# Patient Record
Sex: FEMALE | Race: WHITE | Employment: UNEMPLOYED | URBAN - METROPOLITAN AREA
[De-identification: names, ages, dates, MRNs, and addresses within clinical notes are randomized per-mention and may not be internally consistent; named-entity substitution may affect disease eponyms.]

---

## 2017-03-06 PROBLEM — R00.1 BRADYCARDIA: Status: ACTIVE | Noted: 2017-03-06

## 2018-01-24 PROBLEM — Z98.890 S/P BALLOON DILATATION OF ESOPHAGEAL STRICTURE: Status: ACTIVE | Noted: 2018-01-24

## 2018-03-12 ENCOUNTER — HOSPITAL ENCOUNTER (OUTPATIENT)
Age: 73
Discharge: HOME | End: 2018-03-12
Payer: MEDICARE

## 2018-03-12 LAB
ALBUMIN SERPL-MCNC: 4 G/DL (ref 3.5–5.2)
ALP BLD-CCNC: 84 U/L (ref 35–104)
ALT SERPL-CCNC: 10 U/L (ref 0–32)
ANION GAP SERPL CALCULATED.3IONS-SCNC: 18 MMOL/L (ref 7–16)
AST SERPL-CCNC: 21 U/L (ref 0–31)
BASOPHILS ABSOLUTE: 0.03 E9/L (ref 0–0.2)
BASOPHILS RELATIVE PERCENT: 0.6 % (ref 0–2)
BILIRUB SERPL-MCNC: 0.4 MG/DL (ref 0–1.2)
BUN BLDV-MCNC: 12 MG/DL (ref 8–23)
C-REACTIVE PROTEIN: <0.1 MG/DL (ref 0–0.4)
CALCIUM SERPL-MCNC: 9.3 MG/DL (ref 8.6–10.2)
CHLORIDE BLD-SCNC: 104 MMOL/L (ref 98–107)
CO2: 24 MMOL/L (ref 22–29)
CREAT SERPL-MCNC: 0.9 MG/DL (ref 0.5–1)
EOSINOPHILS ABSOLUTE: 0.11 E9/L (ref 0.05–0.5)
EOSINOPHILS RELATIVE PERCENT: 2 % (ref 0–6)
GFR AFRICAN AMERICAN: >60
GFR NON-AFRICAN AMERICAN: >60 ML/MIN/1.73
GLUCOSE BLD-MCNC: 61 MG/DL (ref 74–109)
HCT VFR BLD CALC: 38.2 % (ref 34–48)
HEMOGLOBIN: 11.9 G/DL (ref 11.5–15.5)
IMMATURE GRANULOCYTES #: 0.01 E9/L
IMMATURE GRANULOCYTES %: 0.2 % (ref 0–5)
LYMPHOCYTES ABSOLUTE: 1.02 E9/L (ref 1.5–4)
LYMPHOCYTES RELATIVE PERCENT: 18.9 % (ref 20–42)
MCH RBC QN AUTO: 30.5 PG (ref 26–35)
MCHC RBC AUTO-ENTMCNC: 31.2 % (ref 32–34.5)
MCV RBC AUTO: 97.9 FL (ref 80–99.9)
MONOCYTES ABSOLUTE: 0.73 E9/L (ref 0.1–0.95)
MONOCYTES RELATIVE PERCENT: 13.5 % (ref 2–12)
NEUTROPHILS ABSOLUTE: 3.49 E9/L (ref 1.8–7.3)
NEUTROPHILS RELATIVE PERCENT: 64.8 % (ref 43–80)
PDW BLD-RTO: 12.8 FL (ref 11.5–15)
PLATELET # BLD: 109 E9/L (ref 130–450)
PMV BLD AUTO: 10.9 FL (ref 7–12)
POTASSIUM SERPL-SCNC: 4.2 MMOL/L (ref 3.5–5)
RBC # BLD: 3.9 E12/L (ref 3.5–5.5)
SODIUM BLD-SCNC: 146 MMOL/L (ref 132–146)
TOTAL PROTEIN: 6.6 G/DL (ref 6.4–8.3)
WBC # BLD: 5.4 E9/L (ref 4.5–11.5)

## 2018-03-12 PROCEDURE — 85025 COMPLETE CBC W/AUTO DIFF WBC: CPT

## 2018-03-12 PROCEDURE — 36415 COLL VENOUS BLD VENIPUNCTURE: CPT

## 2018-03-12 PROCEDURE — 80053 COMPREHEN METABOLIC PANEL: CPT

## 2018-03-12 PROCEDURE — 86140 C-REACTIVE PROTEIN: CPT

## 2018-03-28 ENCOUNTER — HOSPITAL ENCOUNTER (OUTPATIENT)
Dept: CT IMAGING | Age: 73
Discharge: HOME | End: 2018-03-30
Payer: MEDICARE

## 2018-03-28 DIAGNOSIS — K57.90 DIVERTICULOSIS OF INTESTINE WITHOUT BLEEDING, UNSPECIFIED INTESTINAL TRACT LOCATION: ICD-10-CM

## 2018-03-28 DIAGNOSIS — R10.30 LOWER ABDOMINAL PAIN: ICD-10-CM

## 2018-03-28 PROCEDURE — 6360000004 HC RX CONTRAST MEDICATION: Performed by: RADIOLOGY

## 2018-03-28 PROCEDURE — 2580000003 HC RX 258: Performed by: RADIOLOGY

## 2018-03-28 PROCEDURE — 74177 CT ABD & PELVIS W/CONTRAST: CPT

## 2018-03-28 RX ORDER — SODIUM CHLORIDE 0.9 % (FLUSH) 0.9 %
10 SYRINGE (ML) INJECTION ONCE
Status: COMPLETED | OUTPATIENT
Start: 2018-03-28 | End: 2018-03-28

## 2018-03-28 RX ADMIN — IOHEXOL 50 ML: 240 INJECTION, SOLUTION INTRATHECAL; INTRAVASCULAR; INTRAVENOUS; ORAL at 09:37

## 2018-03-28 RX ADMIN — IOPAMIDOL 110 ML: 755 INJECTION, SOLUTION INTRAVENOUS at 09:37

## 2018-03-28 RX ADMIN — Medication 10 ML: at 09:37

## 2018-07-25 PROBLEM — E44.0 MODERATE PROTEIN-CALORIE MALNUTRITION (HCC): Status: ACTIVE | Noted: 2018-07-25

## 2018-10-25 ENCOUNTER — HOSPITAL ENCOUNTER (OUTPATIENT)
Age: 73
Discharge: HOME | End: 2018-10-25
Payer: MEDICARE

## 2018-10-25 LAB
ALBUMIN SERPL-MCNC: 4.3 G/DL (ref 3.5–5.2)
ALP BLD-CCNC: 72 U/L (ref 35–104)
ALT SERPL-CCNC: 11 U/L (ref 0–32)
AMYLASE: 135 U/L (ref 20–100)
AST SERPL-CCNC: 23 U/L (ref 0–31)
BILIRUB SERPL-MCNC: 0.5 MG/DL (ref 0–1.2)
BILIRUBIN DIRECT: <0.2 MG/DL (ref 0–0.3)
BILIRUBIN, INDIRECT: NORMAL MG/DL (ref 0–1)
LIPASE: 93 U/L (ref 13–60)
TOTAL PROTEIN: 7 G/DL (ref 6.4–8.3)

## 2018-10-25 PROCEDURE — 36415 COLL VENOUS BLD VENIPUNCTURE: CPT

## 2018-10-25 PROCEDURE — 83690 ASSAY OF LIPASE: CPT

## 2018-10-25 PROCEDURE — 82150 ASSAY OF AMYLASE: CPT

## 2018-10-25 PROCEDURE — 80076 HEPATIC FUNCTION PANEL: CPT

## 2019-05-06 ENCOUNTER — HOSPITAL ENCOUNTER (OUTPATIENT)
Age: 74
Discharge: HOME | End: 2019-05-06
Payer: MEDICARE

## 2019-05-06 LAB
ALBUMIN SERPL-MCNC: 4.2 G/DL (ref 3.5–5.2)
ALP BLD-CCNC: 86 U/L (ref 35–104)
ALT SERPL-CCNC: 11 U/L (ref 0–32)
AMYLASE: 142 U/L (ref 20–100)
ANION GAP SERPL CALCULATED.3IONS-SCNC: 13 MMOL/L (ref 7–16)
AST SERPL-CCNC: 24 U/L (ref 0–31)
BILIRUB SERPL-MCNC: 0.5 MG/DL (ref 0–1.2)
BUN BLDV-MCNC: 9 MG/DL (ref 8–23)
CALCIUM SERPL-MCNC: 9.1 MG/DL (ref 8.6–10.2)
CHLORIDE BLD-SCNC: 104 MMOL/L (ref 98–107)
CO2: 26 MMOL/L (ref 22–29)
CREAT SERPL-MCNC: 1 MG/DL (ref 0.5–1)
GFR AFRICAN AMERICAN: >60
GFR NON-AFRICAN AMERICAN: 54 ML/MIN/1.73
GLUCOSE BLD-MCNC: 106 MG/DL (ref 74–99)
LIPASE: 82 U/L (ref 13–60)
POTASSIUM SERPL-SCNC: 4.3 MMOL/L (ref 3.5–5)
SODIUM BLD-SCNC: 143 MMOL/L (ref 132–146)
TOTAL PROTEIN: 6.8 G/DL (ref 6.4–8.3)

## 2019-05-06 PROCEDURE — 36415 COLL VENOUS BLD VENIPUNCTURE: CPT

## 2019-05-06 PROCEDURE — 83690 ASSAY OF LIPASE: CPT

## 2019-05-06 PROCEDURE — 80053 COMPREHEN METABOLIC PANEL: CPT

## 2019-05-06 PROCEDURE — 82150 ASSAY OF AMYLASE: CPT

## 2019-05-30 ENCOUNTER — OFFICE VISIT (OUTPATIENT)
Dept: CARDIOLOGY CLINIC | Age: 74
End: 2019-05-30
Payer: MEDICARE

## 2019-05-30 VITALS
BODY MASS INDEX: 18.78 KG/M2 | HEART RATE: 58 BPM | SYSTOLIC BLOOD PRESSURE: 138 MMHG | HEIGHT: 64 IN | WEIGHT: 110 LBS | RESPIRATION RATE: 16 BRPM | DIASTOLIC BLOOD PRESSURE: 74 MMHG

## 2019-05-30 DIAGNOSIS — R00.1 BRADYCARDIA: Primary | ICD-10-CM

## 2019-05-30 DIAGNOSIS — I10 ESSENTIAL HYPERTENSION: ICD-10-CM

## 2019-05-30 PROCEDURE — 3017F COLORECTAL CA SCREEN DOC REV: CPT | Performed by: INTERNAL MEDICINE

## 2019-05-30 PROCEDURE — 1036F TOBACCO NON-USER: CPT | Performed by: INTERNAL MEDICINE

## 2019-05-30 PROCEDURE — G8420 CALC BMI NORM PARAMETERS: HCPCS | Performed by: INTERNAL MEDICINE

## 2019-05-30 PROCEDURE — G8400 PT W/DXA NO RESULTS DOC: HCPCS | Performed by: INTERNAL MEDICINE

## 2019-05-30 PROCEDURE — 93000 ELECTROCARDIOGRAM COMPLETE: CPT | Performed by: INTERNAL MEDICINE

## 2019-05-30 PROCEDURE — 1123F ACP DISCUSS/DSCN MKR DOCD: CPT | Performed by: INTERNAL MEDICINE

## 2019-05-30 PROCEDURE — 4040F PNEUMOC VAC/ADMIN/RCVD: CPT | Performed by: INTERNAL MEDICINE

## 2019-05-30 PROCEDURE — G8427 DOCREV CUR MEDS BY ELIG CLIN: HCPCS | Performed by: INTERNAL MEDICINE

## 2019-05-30 PROCEDURE — 1090F PRES/ABSN URINE INCON ASSESS: CPT | Performed by: INTERNAL MEDICINE

## 2019-05-30 PROCEDURE — 99213 OFFICE O/P EST LOW 20 MIN: CPT | Performed by: INTERNAL MEDICINE

## 2019-05-30 NOTE — PATIENT INSTRUCTIONS
Patient Education        A Healthy Heart: Care Instructions  Your Care Instructions    Heart disease occurs when a substance called plaque builds up in the vessels that supply oxygen-rich blood to your heart. This can narrow the blood vessels and reduce blood flow. A heart attack happens when blood flow is completely blocked. A high-fat diet, smoking, and other factors increase the risk of heart disease. Your doctor has found that you have a chance of having heart disease. You can do lots of things to keep your heart healthy. It may not be easy, but you can change your diet, exercise more, and quit smoking. These steps really work to lower your chance of heart disease. Follow-up care is a key part of your treatment and safety. Be sure to make and go to all appointments, and call your doctor if you are having problems. It's also a good idea to know your test results and keep a list of the medicines you take. How can you care for yourself at home? Diet    · Use less salt when you cook and eat. This helps lower your blood pressure. Taste food before salting. Add only a little salt when you think you need it. With time, your taste buds will adjust to less salt.     · Eat fewer snack items, fast foods, canned soups, and other high-salt, high-fat, processed foods.     · Read food labels and try to avoid saturated and trans fats. They increase your risk of heart disease by raising cholesterol levels.     · Limit the amount of solid fat-butter, margarine, and shortening-you eat. Use olive, peanut, or canola oil when you cook. Bake, broil, and steam foods instead of frying them.     · Eating fish can lower your risk for heart disease. Eat at least 2 servings of fish a week. [place], mackerel, herring, sardines, and chunk light tuna are very good choices. These fish contain omega-3 fatty acids.     · Eat a variety of fruit and vegetables every day.  Dark green, deep orange, red, or yellow fruits and vegetables are especially good for you. Examples include spinach, carrots, peaches, and berries.     · Foods high in fiber can reduce your cholesterol and provide important vitamins and minerals. High-fiber foods include whole-grain cereals and breads, oatmeal, beans, brown rice, citrus fruits, and apples.     · Limit drinks and foods with added sugar. These include candy, desserts, and soda pop.    Lifestyle changes    · If your doctor recommends it, get more exercise. Walking is a good choice. Bit by bit, increase the amount you walk every day. Try for at least 30 minutes on most days of the week. You also may want to swim, bike, or do other activities.     · Do not smoke. If you need help quitting, talk to your doctor about stop-smoking programs and medicines. These can increase your chances of quitting for good. Quitting smoking may be the most important step you can take to protect your heart. It is never too late to quit. You will get health benefits right away.     · Limit alcohol to 2 drinks a day for men and 1 drink a day for women. Too much alcohol can cause health problems. Medicines    · Take your medicines exactly as prescribed. Call your doctor if you think you are having a problem with your medicine.     · If your doctor recommends aspirin, take the amount directed each day. Make sure you take aspirin and not another kind of pain reliever, such as acetaminophen (Tylenol). If you take ibuprofen (such as Advil or Motrin) for other problems, take aspirin at least 2 hours before taking ibuprofen. When should you call for help? Call 911 if you have symptoms of a heart attack.  These may include:    · Chest pain or pressure, or a strange feeling in the chest.     · Sweating.     · Shortness of breath.     · Pain, pressure, or a strange feeling in the back, neck, jaw, or upper belly or in one or both shoulders or arms.     · Lightheadedness or sudden weakness.     · A fast or irregular heartbeat.    After you call 911, the  may tell you to chew 1 adult-strength or 2 to 4 low-dose aspirin. Wait for an ambulance. Do not try to drive yourself.   Watch closely for changes in your health, and be sure to contact your doctor if you have any problems. Where can you learn more? Go to https://chpepiceweb.[email or web address]. org and sign in to your [employer] account. Enter C962 in the [employer] box to learn more about \"A Healthy Heart: Care Instructions. \"     If you do not have an account, please click on the \"Sign Up Now\" link. Current as of: July 22, 2018  Content Version: 12.0  © 8557-3956 Healthwise, Incorporated. Care instructions adapted under license by [facility] ([facility]). If you have questions about a medical condition or this instruction, always ask your healthcare professional. [address] any warranty or liability for your use of this information.

## 2019-05-31 NOTE — PROGRESS NOTES
[facility]  MD Ann-Marie Carlton MD Garnett Record, MD Fredderick Eriksson. Avie Hodgkin, MD            Conrad Gomez   [birth year]  Clarisa Duarte DO    Mrs. Conrad Gomez was in the outpatient office on 5/30/2019 for follow up of her bradycardia. This 73-year-old female has had chronic bradycardia. She is asymptomatic from it. Last echo in 2015 revealed a normal EF. She presented to our office today for a follow up visit. She notes no new cardiac complaints. She denies any dizziness. She denies any orthopnea, PND, or lower extremity edema. No episodes of chest pain. Past medical history:   1. Hypertension. 2. GERD and PUD. 3. Allergies to Altace, Covera, and HCTZ. 4. Never a smoker. 5. No family history of premature CAD. 6. [id number] admission, 08/20/2014 with substernal chest pain radiating to the right side. On presentation, CBC and BMP normal. Enzymes x2 negative. EKG: Atrial bigeminy. No ST changes. 7. Lexiscan MPS, 08/21/2014. Normal perfusion. EF 72%. 8. ERCP and stent, 01/06/2015. Stent removal, 02/17/2015. 9. No prior history of DM, CAD, stroke, or TIA. 10. Echo, 03/05/2015. EF 55-60%. Stage I diastolic dysfunction. Mild MR. Mild TR. Normal RVSP.     Review of Systems:  HEENT: negative for acute visual and auditory problems  Constitutional: negative for fever and chills  Respiratory: negative for cough and hemoptysis  Cardiovascular: as per HPI  Gastrointestinal: negative for abdominal pain, diarrhea, nausea and vomiting  Genitourinary: negative for dysuria and hematuria  Derm: negative for rash and skin lesion(s)  Neurological: negative for seizures and tremors  Endocrine: negative for diabetic symptoms including polydipsia and polyuria  Musculoskeletal: negative for CTD  Psychiatric: negative for anxiety and major depression    On exam, she is an elderly female in no particular distress. BP: 138/74. P: 58. R: 16.  Wt. 110 lbs. BMI: 18. HEENT, conjunctiva pink.

## 2020-01-13 ENCOUNTER — HOSPITAL ENCOUNTER (OUTPATIENT)
Age: 75
Discharge: HOME | End: 2020-01-13
Payer: MEDICARE

## 2020-01-13 LAB
ALBUMIN SERPL-MCNC: 4.3 G/DL (ref 3.5–5.2)
ALP BLD-CCNC: 101 U/L (ref 35–104)
ALT SERPL-CCNC: 11 U/L (ref 0–32)
AMYLASE: 160 U/L (ref 20–100)
ANION GAP SERPL CALCULATED.3IONS-SCNC: 18 MMOL/L (ref 7–16)
AST SERPL-CCNC: 25 U/L (ref 0–31)
BASOPHILS ABSOLUTE: 0.03 E9/L (ref 0–0.2)
BASOPHILS RELATIVE PERCENT: 0.6 % (ref 0–2)
BILIRUB SERPL-MCNC: 0.4 MG/DL (ref 0–1.2)
BUN BLDV-MCNC: 9 MG/DL (ref 8–23)
CALCIUM SERPL-MCNC: 9.4 MG/DL (ref 8.6–10.2)
CHLORIDE BLD-SCNC: 105 MMOL/L (ref 98–107)
CO2: 22 MMOL/L (ref 22–29)
CREAT SERPL-MCNC: 0.8 MG/DL (ref 0.5–1)
EOSINOPHILS ABSOLUTE: 0.05 E9/L (ref 0.05–0.5)
EOSINOPHILS RELATIVE PERCENT: 1 % (ref 0–6)
GFR AFRICAN AMERICAN: >60
GFR NON-AFRICAN AMERICAN: >60 ML/MIN/1.73
GLUCOSE BLD-MCNC: 69 MG/DL (ref 74–99)
HCT VFR BLD CALC: 41.2 % (ref 34–48)
HEMOGLOBIN: 12.4 G/DL (ref 11.5–15.5)
IMMATURE GRANULOCYTES #: 0.02 E9/L
IMMATURE GRANULOCYTES %: 0.4 % (ref 0–5)
LIPASE: 91 U/L (ref 13–60)
LYMPHOCYTES ABSOLUTE: 0.89 E9/L (ref 1.5–4)
LYMPHOCYTES RELATIVE PERCENT: 18.2 % (ref 20–42)
MCH RBC QN AUTO: 30.8 PG (ref 26–35)
MCHC RBC AUTO-ENTMCNC: 30.1 % (ref 32–34.5)
MCV RBC AUTO: 102.2 FL (ref 80–99.9)
MONOCYTES ABSOLUTE: 0.58 E9/L (ref 0.1–0.95)
MONOCYTES RELATIVE PERCENT: 11.9 % (ref 2–12)
NEUTROPHILS ABSOLUTE: 3.31 E9/L (ref 1.8–7.3)
NEUTROPHILS RELATIVE PERCENT: 67.9 % (ref 43–80)
PDW BLD-RTO: 12.2 FL (ref 11.5–15)
PLATELET # BLD: 121 E9/L (ref 130–450)
PMV BLD AUTO: 10.9 FL (ref 7–12)
POTASSIUM SERPL-SCNC: 3.9 MMOL/L (ref 3.5–5)
RBC # BLD: 4.03 E12/L (ref 3.5–5.5)
SODIUM BLD-SCNC: 145 MMOL/L (ref 132–146)
TOTAL PROTEIN: 7 G/DL (ref 6.4–8.3)
WBC # BLD: 4.9 E9/L (ref 4.5–11.5)

## 2020-01-13 PROCEDURE — 80053 COMPREHEN METABOLIC PANEL: CPT

## 2020-01-13 PROCEDURE — 36415 COLL VENOUS BLD VENIPUNCTURE: CPT

## 2020-01-13 PROCEDURE — 83690 ASSAY OF LIPASE: CPT

## 2020-01-13 PROCEDURE — 85025 COMPLETE CBC W/AUTO DIFF WBC: CPT

## 2020-01-13 PROCEDURE — 82150 ASSAY OF AMYLASE: CPT

## 2020-01-22 PROBLEM — R00.1 BRADYCARDIA: Status: RESOLVED | Noted: 2017-03-06 | Resolved: 2020-01-22

## 2020-04-17 ENCOUNTER — TELEPHONE (OUTPATIENT)
Dept: CARDIOLOGY CLINIC | Age: 75
End: 2020-04-17

## 2020-07-29 ENCOUNTER — OFFICE VISIT (OUTPATIENT)
Dept: CARDIOLOGY CLINIC | Age: 75
End: 2020-07-29
Payer: MEDICARE

## 2020-07-29 VITALS
HEART RATE: 57 BPM | SYSTOLIC BLOOD PRESSURE: 128 MMHG | HEIGHT: 62 IN | DIASTOLIC BLOOD PRESSURE: 60 MMHG | WEIGHT: 114 LBS | BODY MASS INDEX: 20.98 KG/M2 | RESPIRATION RATE: 16 BRPM

## 2020-07-29 PROCEDURE — 99213 OFFICE O/P EST LOW 20 MIN: CPT | Performed by: INTERNAL MEDICINE

## 2020-07-29 PROCEDURE — G8420 CALC BMI NORM PARAMETERS: HCPCS | Performed by: INTERNAL MEDICINE

## 2020-07-29 PROCEDURE — 4040F PNEUMOC VAC/ADMIN/RCVD: CPT | Performed by: INTERNAL MEDICINE

## 2020-07-29 PROCEDURE — 1036F TOBACCO NON-USER: CPT | Performed by: INTERNAL MEDICINE

## 2020-07-29 PROCEDURE — 1090F PRES/ABSN URINE INCON ASSESS: CPT | Performed by: INTERNAL MEDICINE

## 2020-07-29 PROCEDURE — G8400 PT W/DXA NO RESULTS DOC: HCPCS | Performed by: INTERNAL MEDICINE

## 2020-07-29 PROCEDURE — 93000 ELECTROCARDIOGRAM COMPLETE: CPT | Performed by: INTERNAL MEDICINE

## 2020-07-29 PROCEDURE — 1123F ACP DISCUSS/DSCN MKR DOCD: CPT | Performed by: INTERNAL MEDICINE

## 2020-07-29 PROCEDURE — G8427 DOCREV CUR MEDS BY ELIG CLIN: HCPCS | Performed by: INTERNAL MEDICINE

## 2020-07-29 ASSESSMENT — ENCOUNTER SYMPTOMS
VOMITING: 0
BACK PAIN: 1
ABDOMINAL PAIN: 0
NAUSEA: 0
BLOOD IN STOOL: 0
DIARRHEA: 0
SHORTNESS OF BREATH: 0
WHEEZING: 0
COUGH: 0
CONSTIPATION: 0

## 2020-07-29 NOTE — PROGRESS NOTES
OUTPATIENT CARDIOLOGY FOLLOW-UP    HPI:    Name: Clair Hauser    Age: 66 y.o. Primary Care Physician: Madison Chandler DO    Date of Service: 7/29/2020    Chief Complaint:   Chief Complaint   Patient presents with    Hypertension    Bradycardia        History of present illness : 64-year-old non-smoker female who comes today for one-year follow-up visit. She was seen by Dr. Corina Reed in the past.  She has history of hypertension, esophageal stricture, status post balloon dilatation and ulcer. She is a poor historian. She is fairly active. She denies chest pain but has been complaining of back /neck pain while doing her housework for almost a year. She feels occasional dizziness and does get lower extremity edema. She denies orthopnea, PND or syncope. EKG done today revealed sinus bradycardia at 57 bpm, left atrial enlargement, poor R wave progression, cannot exclude anteroseptal myocardial infarction of unknown age. Review of Systems:   Review of Systems   Constitutional: Negative for chills, fatigue and fever. She has a poor appetite and she has been losing weight. HENT: Negative for nosebleeds. Respiratory: Negative for cough, shortness of breath and wheezing. Gastrointestinal: Negative for abdominal pain, blood in stool, constipation, diarrhea, nausea and vomiting. She has difficulty swallowing liquid and solid. Genitourinary: Negative for dysuria and hematuria. Musculoskeletal: Positive for back pain. Negative for joint swelling and myalgias. Neurological: Negative for syncope and light-headedness. Psychiatric/Behavioral: The patient is not nervous/anxious.            Past Medical History:  Past Medical History:   Diagnosis Date    Bradycardia 3/6/2017    Common bile duct dilation     GERD (gastroesophageal reflux disease)     Hypertension     Weight loss, unintentional        Past Surgical History:  Past Surgical History:   Procedure Laterality Date    CHOLECYSTECTOMY      COLONOSCOPY      ENDOSCOPY, COLON, DIAGNOSTIC      ERCP  01/06/2015    ERCP  2/17/2015    with stent removal    UPPER GASTROINTESTINAL ENDOSCOPY  08/23/2017    Dr Benny Cruz       Family History:  Family History   Problem Relation Age of Onset    High Blood Pressure Mother     Heart Disease Sister     High Blood Pressure Sister     High Blood Pressure Maternal Aunt     Asthma Maternal Grandmother     No Known Problems Brother        Social History:  Social History     Socioeconomic History    Marital status:      Spouse name: Zofia Fleming    Number of children: 2    Years of education: 15    Highest education level: Not on file   Occupational History    Not on file   Social Needs    Financial resource strain: Not on file    Food insecurity     Worry: Not on file     Inability: Not on file   [employer] needs     Medical: Not on file     Non-medical: Not on file   Tobacco Use    Smoking status: Never Smoker    Smokeless tobacco: Never Used   Substance and Sexual Activity    Alcohol use: Not Currently    Drug use: Never    Sexual activity: Not on file   Lifestyle    Physical activity     Days per week: Not on file     Minutes per session: Not on file    Stress: Not on file   Relationships    Social connections     Talks on phone: Not on file     Gets together: Not on file     Attends Zoroastrianism service: Not on file     Active member of club or organization: Not on file     Attends meetings of clubs or organizations: Not on file     Relationship status: Not on file    Intimate partner violence     Fear of current or ex partner: Not on file     Emotionally abused: Not on file     Physically abused: Not on file     Forced sexual activity: Not on file   Other Topics Concern    Not on file   Social History Narrative    Denies caffeine. Allergies:   Allergies   Allergen Reactions    Altace [Ramipril]     Covera-Hs [Verapamil]     Hctz [Hydrochlorothiazide]  Influenza Vaccines      miscarriage       Current Medications:  Current Outpatient Medications   Medication Sig Dispense Refill    amLODIPine (NORVASC) 5 MG tablet Take 1 tablet by mouth daily 90 tablet 0    valsartan (DIOVAN) 160 MG tablet Take 1 tablet by mouth daily 90 tablet 1     No current facility-administered medications for this visit. Physical Exam:  /60   Pulse 57   Resp 16   Ht 5' 2\" (1.575 m)   Wt 114 lb (51.7 kg)   BMI 20.85 kg/m²   Wt Readings from Last 3 Encounters:   07/29/20 114 lb (51.7 kg)   07/22/20 114 lb (51.7 kg)   01/22/20 117 lb (53.1 kg)       Physical Exam  Constitutional:       General: She is not in acute distress. Appearance: She is well-developed. HENT:      Head: Normocephalic and atraumatic. Neck:      Musculoskeletal: Neck supple. Vascular: No carotid bruit or JVD. Cardiovascular:      Rate and Rhythm: Normal rate and regular rhythm. Pulses: Normal pulses. Heart sounds: No murmur. No friction rub. No gallop. Pulmonary:      Breath sounds: Normal breath sounds. No wheezing or rales. Chest:      Chest wall: No tenderness. Abdominal:      General: Bowel sounds are normal. There is no distension. Palpations: Abdomen is soft. There is no mass. Tenderness: There is no abdominal tenderness. Comments: No abdominal bruit. Musculoskeletal:      Right lower leg: No edema. Left lower leg: No edema. Skin:     General: Skin is warm and dry. Neurological:      Mental Status: She is alert and oriented to person, place, and time.            Laboratory Tests:  Lab Results   Component Value Date    CREATININE 0.8 01/13/2020    BUN 9 01/13/2020     01/13/2020    K 3.9 01/13/2020     01/13/2020    CO2 22 01/13/2020     Lab Results   Component Value Date    MG 1.9 12/18/2014     Lab Results   Component Value Date    WBC 4.9 01/13/2020    HGB 12.4 01/13/2020    HCT 41.2 01/13/2020    .2 (H) 01/13/2020  (L) 01/13/2020     Lab Results   Component Value Date    ALT 11 01/13/2020    AST 25 01/13/2020    ALKPHOS 101 01/13/2020    BILITOT 0.4 01/13/2020     Lab Results   Component Value Date    TROPONINI <0.01 09/09/2014    TROPONINI <0.01 09/08/2014    TROPONINI <0.01 09/08/2014     Lab Results   Component Value Date    INR 1.0 02/17/2015    INR 1.0 01/06/2015    INR 1.1 09/08/2014    PROTIME 10.9 02/17/2015    PROTIME 10.7 01/06/2015    PROTIME 11.2 09/08/2014     Lab Results   Component Value Date    TSH 0.870 09/19/2016     Lab Results   Component Value Date    CHOL 150 09/09/2014     Lab Results   Component Value Date    TRIG 104 09/09/2014     Lab Results   Component Value Date    HDL 44 09/09/2014     Lab Results   Component Value Date    LDLCALC 85 09/09/2014     Lab Results   Component Value Date    LABVLDL 21 09/09/2014         ASSESSMENT / PLAN:  -Hypertension: Controlled. -Back pain: Unclear etiology. It could be due to underlying CAD, or musculoskeletal in origin.  -Esophageal stricture, status post balloon dilatation.  -Poor appetite and weight loss. We will continue her current blood pressure medications. Patient did not want to undergo a Lexiscan to rule out significant CAD. We will follow-up at the office in 1 year. The patient's current medication list, allergies, problem list and results of all previously ordered testing were reviewed at today's visit. {  Kellie Harris MD , [facility] [place], [place].   [facility]) Cardiology

## 2021-02-18 ENCOUNTER — HOSPITAL ENCOUNTER (OUTPATIENT)
Age: 76
Discharge: HOME | End: 2021-02-18
Payer: MEDICARE

## 2021-02-18 LAB
ALBUMIN SERPL-MCNC: 4.3 G/DL (ref 3.5–5.2)
ALP BLD-CCNC: 91 U/L (ref 35–104)
ALT SERPL-CCNC: 10 U/L (ref 0–32)
AMYLASE: 169 U/L (ref 20–100)
ANION GAP SERPL CALCULATED.3IONS-SCNC: 13 MMOL/L (ref 7–16)
AST SERPL-CCNC: 24 U/L (ref 0–31)
BASOPHILS ABSOLUTE: 0.03 E9/L (ref 0–0.2)
BASOPHILS RELATIVE PERCENT: 0.9 % (ref 0–2)
BILIRUB SERPL-MCNC: 0.5 MG/DL (ref 0–1.2)
BUN BLDV-MCNC: 8 MG/DL (ref 8–23)
CALCIUM SERPL-MCNC: 9.4 MG/DL (ref 8.6–10.2)
CHLORIDE BLD-SCNC: 103 MMOL/L (ref 98–107)
CO2: 25 MMOL/L (ref 22–29)
CREAT SERPL-MCNC: 1 MG/DL (ref 0.5–1)
EOSINOPHILS ABSOLUTE: 0.05 E9/L (ref 0.05–0.5)
EOSINOPHILS RELATIVE PERCENT: 1.5 % (ref 0–6)
GFR AFRICAN AMERICAN: >60
GFR NON-AFRICAN AMERICAN: 54 ML/MIN/1.73
GLUCOSE BLD-MCNC: 106 MG/DL (ref 74–99)
HCT VFR BLD CALC: 41.8 % (ref 34–48)
HEMOGLOBIN: 12.8 G/DL (ref 11.5–15.5)
IMMATURE GRANULOCYTES #: 0.01 E9/L
IMMATURE GRANULOCYTES %: 0.3 % (ref 0–5)
LIPASE: 76 U/L (ref 13–60)
LYMPHOCYTES ABSOLUTE: 0.67 E9/L (ref 1.5–4)
LYMPHOCYTES RELATIVE PERCENT: 20.2 % (ref 20–42)
MCH RBC QN AUTO: 30.8 PG (ref 26–35)
MCHC RBC AUTO-ENTMCNC: 30.6 % (ref 32–34.5)
MCV RBC AUTO: 100.7 FL (ref 80–99.9)
MONOCYTES ABSOLUTE: 0.34 E9/L (ref 0.1–0.95)
MONOCYTES RELATIVE PERCENT: 10.2 % (ref 2–12)
NEUTROPHILS ABSOLUTE: 2.22 E9/L (ref 1.8–7.3)
NEUTROPHILS RELATIVE PERCENT: 66.9 % (ref 43–80)
PDW BLD-RTO: 12.1 FL (ref 11.5–15)
PLATELET # BLD: 111 E9/L (ref 130–450)
PMV BLD AUTO: 10.8 FL (ref 7–12)
POTASSIUM SERPL-SCNC: 3.9 MMOL/L (ref 3.5–5)
RBC # BLD: 4.15 E12/L (ref 3.5–5.5)
SODIUM BLD-SCNC: 141 MMOL/L (ref 132–146)
TOTAL PROTEIN: 7.1 G/DL (ref 6.4–8.3)
WBC # BLD: 3.3 E9/L (ref 4.5–11.5)

## 2021-02-18 PROCEDURE — 82150 ASSAY OF AMYLASE: CPT

## 2021-02-18 PROCEDURE — 80053 COMPREHEN METABOLIC PANEL: CPT

## 2021-02-18 PROCEDURE — 85025 COMPLETE CBC W/AUTO DIFF WBC: CPT

## 2021-02-18 PROCEDURE — 36415 COLL VENOUS BLD VENIPUNCTURE: CPT

## 2021-02-18 PROCEDURE — 86301 IMMUNOASSAY TUMOR CA 19-9: CPT

## 2021-02-18 PROCEDURE — 83690 ASSAY OF LIPASE: CPT

## 2021-02-20 LAB — CA 19-9: 7 U/ML (ref 0–37)

## 2021-08-18 ENCOUNTER — OFFICE VISIT (OUTPATIENT)
Dept: CARDIOLOGY CLINIC | Age: 76
End: 2021-08-18
Payer: MEDICARE

## 2021-08-18 VITALS
HEIGHT: 60 IN | RESPIRATION RATE: 16 BRPM | HEART RATE: 61 BPM | SYSTOLIC BLOOD PRESSURE: 120 MMHG | OXYGEN SATURATION: 99 % | WEIGHT: 114.6 LBS | DIASTOLIC BLOOD PRESSURE: 76 MMHG | BODY MASS INDEX: 22.5 KG/M2

## 2021-08-18 DIAGNOSIS — R00.1 BRADYCARDIA: Primary | ICD-10-CM

## 2021-08-18 PROCEDURE — G8400 PT W/DXA NO RESULTS DOC: HCPCS | Performed by: INTERNAL MEDICINE

## 2021-08-18 PROCEDURE — 93000 ELECTROCARDIOGRAM COMPLETE: CPT | Performed by: INTERNAL MEDICINE

## 2021-08-18 PROCEDURE — G8427 DOCREV CUR MEDS BY ELIG CLIN: HCPCS | Performed by: INTERNAL MEDICINE

## 2021-08-18 PROCEDURE — 1123F ACP DISCUSS/DSCN MKR DOCD: CPT | Performed by: INTERNAL MEDICINE

## 2021-08-18 PROCEDURE — 1090F PRES/ABSN URINE INCON ASSESS: CPT | Performed by: INTERNAL MEDICINE

## 2021-08-18 PROCEDURE — 4040F PNEUMOC VAC/ADMIN/RCVD: CPT | Performed by: INTERNAL MEDICINE

## 2021-08-18 PROCEDURE — G8420 CALC BMI NORM PARAMETERS: HCPCS | Performed by: INTERNAL MEDICINE

## 2021-08-18 PROCEDURE — 1036F TOBACCO NON-USER: CPT | Performed by: INTERNAL MEDICINE

## 2021-08-18 PROCEDURE — 99213 OFFICE O/P EST LOW 20 MIN: CPT | Performed by: INTERNAL MEDICINE

## 2021-08-18 ASSESSMENT — ENCOUNTER SYMPTOMS
COUGH: 0
VOMITING: 0
SHORTNESS OF BREATH: 0
NAUSEA: 0
WHEEZING: 0
BLOOD IN STOOL: 0
ABDOMINAL PAIN: 0
CONSTIPATION: 0
BACK PAIN: 1
DIARRHEA: 0

## 2021-08-18 NOTE — PROGRESS NOTES
OUTPATIENT CARDIOLOGY FOLLOW-UP    HPI:    Name: Maria T Peterson    Age: 64 y.o. Primary Care Physician: Antonio Swanson DO    Date of Service: 8/18/2021    Chief Complaint:   Chief Complaint   Patient presents with    Bradycardia     annual ov,         History of present illness :   78-year-old non-smoker female who comes today for one-year follow-up visit. She was seen in my office on 7/29/2020. She has history of hypertension, esophageal stricture, status post balloon dilatation and ulcer. Patient feels good. She is fairly active. She can go up 1 flight of stairs with no chest pain or dyspnea on exertion. She has been complaining of on and off upper back pain since prior her last visit to my office. She denies palpitations, dizziness or syncope. She denies orthopnea or PND but admits to get lower extremity edema. EKG done today revealed sinus rhythm at 61 bpm, left atrial enlargement, left left axis deviation and on the septal myocardial infarction of unknown age. Review of Systems:   Review of Systems   Constitutional: Negative for chills, fatigue and fever. HENT: Negative for nosebleeds. Respiratory: Negative for cough, shortness of breath and wheezing. Gastrointestinal: Negative for abdominal pain, blood in stool, constipation, diarrhea, nausea and vomiting. Burping. Genitourinary: Negative for dysuria and hematuria. Musculoskeletal: Positive for back pain. Negative for joint swelling and myalgias. Neurological: Positive for headaches. Negative for syncope and light-headedness. Psychiatric/Behavioral: The patient is not nervous/anxious.            Past Medical History:  Past Medical History:   Diagnosis Date    Bradycardia 3/6/2017    Common bile duct dilation     GERD (gastroesophageal reflux disease)     Hypertension     Weight loss, unintentional        Past Surgical History:  Past Surgical History:   Procedure Laterality Date    CHOLECYSTECTOMY      COLONOSCOPY      ENDOSCOPY, COLON, DIAGNOSTIC      ERCP  01/06/2015    ERCP  2/17/2015    with stent removal    UPPER GASTROINTESTINAL ENDOSCOPY  08/23/2017    Dr Roberta Wright       Family History:  Family History   Problem Relation Age of Onset    High Blood Pressure Mother     Heart Disease Sister     High Blood Pressure Sister     High Blood Pressure Maternal Aunt     Asthma Maternal Grandmother     No Known Problems Brother        Social History:  Social History     Socioeconomic History    Marital status:      Spouse name: Young Thomas    Number of children: 2    Years of education: 15    Highest education level: Not on file   Occupational History    Not on file   Tobacco Use    Smoking status: Never Smoker    Smokeless tobacco: Never Used   Vaping Use    Vaping Use: Never used   Substance and Sexual Activity    Alcohol use: Not Currently    Drug use: Never    Sexual activity: Not on file   Other Topics Concern    Not on file   Social History Narrative    Denies caffeine. Social Determinants of Health     Financial Resource Strain:     Difficulty of Paying Living Expenses:    Food Insecurity: No Food Insecurity    Worried About Running Out of Food in the Last Year: Never true    Parul of Food in the Last Year: Never true   Transportation Needs:     Lack of Transportation (Medical):      Lack of Transportation (Non-Medical):    Physical Activity:     Days of Exercise per Week:     Minutes of Exercise per Session:    Stress:     Feeling of Stress :    Social Connections:     Frequency of Communication with Friends and Family:     Frequency of Social Gatherings with Friends and Family:     Attends Yazdanism Services:     Active Member of Clubs or Organizations:     Attends Club or Organization Meetings:     Marital Status:    Intimate Partner Violence:     Fear of Current or Ex-Partner:     Emotionally Abused:     Physically Abused:     Sexually Abused: Allergies: Allergies   Allergen Reactions    Altace [Ramipril]     Covera-Hs [Verapamil]     Hctz [Hydrochlorothiazide]     Influenza Vaccines      miscarriage       Current Medications:  Current Outpatient Medications   Medication Sig Dispense Refill    valsartan (DIOVAN) 160 MG tablet Take 0.5 tablets by mouth daily 90 tablet 3     No current facility-administered medications for this visit. Physical Exam:  Ht 5' (1.524 m)   BMI 22.75 kg/m²   Wt Readings from Last 3 Encounters:   07/26/21 116 lb 8 oz (52.8 kg)   01/20/21 115 lb (52.2 kg)   07/29/20 114 lb (51.7 kg)       Physical Exam  Constitutional:       General: She is not in acute distress. Appearance: She is well-developed. HENT:      Head: Normocephalic and atraumatic. Neck:      Vascular: No carotid bruit or JVD. Cardiovascular:      Rate and Rhythm: Normal rate and regular rhythm. Heart sounds: No murmur heard. No friction rub. No gallop. Pulmonary:      Breath sounds: Normal breath sounds. No wheezing or rales. Chest:      Chest wall: No tenderness. Abdominal:      General: Bowel sounds are normal. There is no distension. Palpations: Abdomen is soft. There is no mass. Tenderness: There is no abdominal tenderness. Comments: No abdominal bruit. Musculoskeletal:      Cervical back: Neck supple. Right lower leg: Edema present. Left lower leg: Edema present. Comments: 1+ bilateral pitting lower extremity edema. Skin:     General: Skin is warm and dry. Neurological:      Mental Status: She is alert and oriented to person, place, and time.            Laboratory Tests:  Lab Results   Component Value Date    CREATININE 1.0 02/18/2021    BUN 8 02/18/2021     02/18/2021    K 3.9 02/18/2021     02/18/2021    CO2 25 02/18/2021     Lab Results   Component Value Date    MG 1.9 12/18/2014     Lab Results   Component Value Date    WBC 3.3 (L) 02/18/2021    HGB 12.8 02/18/2021    HCT 41.8 02/18/2021    .7 (H) 02/18/2021     (L) 02/18/2021     Lab Results   Component Value Date    ALT 10 02/18/2021    AST 24 02/18/2021    ALKPHOS 91 02/18/2021    BILITOT 0.5 02/18/2021     Lab Results   Component Value Date    TROPONINI <0.01 09/09/2014    TROPONINI <0.01 09/08/2014    TROPONINI <0.01 09/08/2014     Lab Results   Component Value Date    INR 1.0 02/17/2015    INR 1.0 01/06/2015    INR 1.1 09/08/2014    PROTIME 10.9 02/17/2015    PROTIME 10.7 01/06/2015    PROTIME 11.2 09/08/2014     Lab Results   Component Value Date    TSH 0.870 09/19/2016       Lab Results   Component Value Date    CHOL 150 09/09/2014     Lab Results   Component Value Date    TRIG 104 09/09/2014     Lab Results   Component Value Date    HDL 44 09/09/2014     Lab Results   Component Value Date    LDLCALC 85 09/09/2014     Lab Results   Component Value Date    LABVLDL 21 09/09/2014         ASSESSMENT / PLAN:  -Lower extremity edema: Unclear etiology.  -Hypertension: Controlled. -Status post esophageal dilatation.  -Status post post ERCP. -Chronic back pain. We will continue Diovan. Patient was advised to have low-salt diet and leg elevation at home. Patient did not want to undergo an echocardiogram to assess her ejection fraction, rule out wall motion abnormalities, and rule out significant valvular heart disease. She also did not want to have a Lexiscan to rule out significant myocardial ischemia. She states that she is busy taking care of her sick . Consider low-dose Lasix and potassium supplementation if worsening of her lower extremity edema. We will follow-up at the office in 1 year. The patient's current medication list, allergies, problem list and results of all previously ordered testing were reviewed at today's visit. {  Jo Ann Jiménez MD , [facility] - [place].   [facility]) Cardiology

## 2022-04-28 ENCOUNTER — HOSPITAL ENCOUNTER (OUTPATIENT)
Age: 77
Discharge: HOME | End: 2022-04-28
Payer: MEDICARE

## 2022-04-28 DIAGNOSIS — I10 ESSENTIAL HYPERTENSION, BENIGN: Chronic | ICD-10-CM

## 2022-04-28 DIAGNOSIS — Z11.59 ENCOUNTER FOR HEPATITIS C SCREENING TEST FOR LOW RISK PATIENT: ICD-10-CM

## 2022-04-28 DIAGNOSIS — E44.0 MODERATE PROTEIN-CALORIE MALNUTRITION (HCC): ICD-10-CM

## 2022-04-28 LAB
ALBUMIN SERPL-MCNC: 4.8 G/DL (ref 3.5–5.2)
ALP BLD-CCNC: 105 U/L (ref 35–104)
ALT SERPL-CCNC: 12 U/L (ref 0–32)
ANION GAP SERPL CALCULATED.3IONS-SCNC: 13 MMOL/L (ref 7–16)
AST SERPL-CCNC: 28 U/L (ref 0–31)
BILIRUB SERPL-MCNC: 0.7 MG/DL (ref 0–1.2)
BUN BLDV-MCNC: 9 MG/DL (ref 6–23)
CALCIUM SERPL-MCNC: 9.6 MG/DL (ref 8.6–10.2)
CHLORIDE BLD-SCNC: 102 MMOL/L (ref 98–107)
CO2: 27 MMOL/L (ref 22–29)
CREAT SERPL-MCNC: 0.9 MG/DL (ref 0.5–1)
GFR AFRICAN AMERICAN: >60
GFR NON-AFRICAN AMERICAN: >60 ML/MIN/1.73
GLUCOSE BLD-MCNC: 95 MG/DL (ref 74–99)
HCT VFR BLD CALC: 46.1 % (ref 34–48)
HEMOGLOBIN: 14.7 G/DL (ref 11.5–15.5)
MCH RBC QN AUTO: 31.7 PG (ref 26–35)
MCHC RBC AUTO-ENTMCNC: 31.9 % (ref 32–34.5)
MCV RBC AUTO: 99.4 FL (ref 80–99.9)
PDW BLD-RTO: 12.3 FL (ref 11.5–15)
PLATELET # BLD: 108 E9/L (ref 130–450)
PMV BLD AUTO: 10.9 FL (ref 7–12)
POTASSIUM SERPL-SCNC: 4 MMOL/L (ref 3.5–5)
RBC # BLD: 4.64 E12/L (ref 3.5–5.5)
SODIUM BLD-SCNC: 142 MMOL/L (ref 132–146)
TOTAL PROTEIN: 7.7 G/DL (ref 6.4–8.3)
WBC # BLD: 3.3 E9/L (ref 4.5–11.5)

## 2022-04-28 PROCEDURE — 36415 COLL VENOUS BLD VENIPUNCTURE: CPT

## 2022-04-28 PROCEDURE — 85027 COMPLETE CBC AUTOMATED: CPT

## 2022-04-28 PROCEDURE — 80053 COMPREHEN METABOLIC PANEL: CPT

## 2022-04-28 PROCEDURE — 87522 HEPATITIS C REVRS TRNSCRPJ: CPT

## 2022-04-28 PROCEDURE — 86803 HEPATITIS C AB TEST: CPT

## 2022-04-29 LAB — HEPATITIS C ANTIBODY INTERPRETATION: NORMAL

## 2022-05-01 LAB
HCV QNT BY NAAT IU/ML: NOT DETECTED IU/ML
HCV QNT BY NAAT LOG IU/ML: NOT DETECTED LOG IU/ML
INTERPRETATION: NOT DETECTED

## 2022-08-22 ENCOUNTER — OFFICE VISIT (OUTPATIENT)
Dept: CARDIOLOGY CLINIC | Age: 77
End: 2022-08-22
Payer: MEDICARE

## 2022-08-22 VITALS
HEART RATE: 66 BPM | OXYGEN SATURATION: 96 % | SYSTOLIC BLOOD PRESSURE: 120 MMHG | HEIGHT: 62 IN | BODY MASS INDEX: 20.24 KG/M2 | DIASTOLIC BLOOD PRESSURE: 64 MMHG | RESPIRATION RATE: 16 BRPM | WEIGHT: 110 LBS

## 2022-08-22 DIAGNOSIS — I44.1 MOBITZ TYPE 2 SECOND DEGREE AV BLOCK: ICD-10-CM

## 2022-08-22 DIAGNOSIS — I10 ESSENTIAL HYPERTENSION, BENIGN: Primary | ICD-10-CM

## 2022-08-22 PROCEDURE — 1123F ACP DISCUSS/DSCN MKR DOCD: CPT | Performed by: INTERNAL MEDICINE

## 2022-08-22 PROCEDURE — G8420 CALC BMI NORM PARAMETERS: HCPCS | Performed by: INTERNAL MEDICINE

## 2022-08-22 PROCEDURE — G8427 DOCREV CUR MEDS BY ELIG CLIN: HCPCS | Performed by: INTERNAL MEDICINE

## 2022-08-22 PROCEDURE — G8400 PT W/DXA NO RESULTS DOC: HCPCS | Performed by: INTERNAL MEDICINE

## 2022-08-22 PROCEDURE — 93000 ELECTROCARDIOGRAM COMPLETE: CPT | Performed by: INTERNAL MEDICINE

## 2022-08-22 PROCEDURE — 99214 OFFICE O/P EST MOD 30 MIN: CPT | Performed by: INTERNAL MEDICINE

## 2022-08-22 PROCEDURE — 1090F PRES/ABSN URINE INCON ASSESS: CPT | Performed by: INTERNAL MEDICINE

## 2022-08-22 PROCEDURE — 1036F TOBACCO NON-USER: CPT | Performed by: INTERNAL MEDICINE

## 2022-08-22 ASSESSMENT — ENCOUNTER SYMPTOMS
CONSTIPATION: 0
COUGH: 0
BLOOD IN STOOL: 0
SHORTNESS OF BREATH: 0
NAUSEA: 0
WHEEZING: 0
VOMITING: 0
DIARRHEA: 0
BACK PAIN: 0
ABDOMINAL PAIN: 0

## 2022-08-22 NOTE — PROGRESS NOTES
OUTPATIENT CARDIOLOGY FOLLOW-UP    HPI:    Name: Shelby Conteh    Age: 76 y.o. Primary Care Physician: John Hanson DO    Date of Service: 8/22/2022    Chief Complaint:   Chief Complaint   Patient presents with    Hypertension     Annual OV. Denies cardiac complaints        History of present illness :   66-year-old non-smoker female who comes today for one-year follow-up visit. She has history of hypertension, esophageal stricture, status post balloon dilatation and ulcer. Patient feels good. She is fairly active. She can go up 9 steps at home with no chest pain or dyspnea on exertion. She denies palpitations, dizziness, lightheadedness or syncope. She denies orthopnea, PND or lower extremity edema. EKG done today revealed sinus rhythm at 66 bpm with first degree AV block and Mobitz 2 AV block, probable anterior myocardial infarction of unknown age and LVH. Review of Systems:   Review of Systems   Constitutional:  Negative for chills, fatigue and fever. HENT:  Negative for nosebleeds. Respiratory:  Negative for cough, shortness of breath and wheezing. Gastrointestinal:  Negative for abdominal pain, blood in stool, constipation, diarrhea, nausea and vomiting. GERD. Little problem of swallowing of solid food. Genitourinary:  Negative for dysuria and hematuria. Musculoskeletal:  Negative for back pain, joint swelling and myalgias. Aching. Neurological:  Negative for syncope and light-headedness. Psychiatric/Behavioral:  The patient is not nervous/anxious.          Past Medical History:  Past Medical History:   Diagnosis Date    Bradycardia 3/6/2017    Common bile duct dilation     GERD (gastroesophageal reflux disease)     Hypertension     Weight loss, unintentional        Past Surgical History:  Past Surgical History:   Procedure Laterality Date    CHOLECYSTECTOMY      COLONOSCOPY      ENDOSCOPY, COLON, DIAGNOSTIC      ERCP  01/06/2015    ERCP  2/17/2015    with stent removal    UPPER GASTROINTESTINAL ENDOSCOPY  08/23/2017    Dr Marc Law       Family History:  Family History   Problem Relation Age of Onset    High Blood Pressure Mother     Heart Disease Sister     High Blood Pressure Sister     High Blood Pressure Maternal Aunt     Asthma Maternal Grandmother     No Known Problems Brother        Social History:  Social History     Socioeconomic History    Marital status:      Spouse name: Eva Anaya    Number of children: 2    Years of education: 12    Highest education level: Not on file   Occupational History    Not on file   Tobacco Use    Smoking status: Never    Smokeless tobacco: Never   Vaping Use    Vaping Use: Never used   Substance and Sexual Activity    Alcohol use: Not Currently    Drug use: Never    Sexual activity: Not on file   Other Topics Concern    Not on file   Social History Narrative    Denies caffeine. Social Determinants of Health     Financial Resource Strain: Low Risk     Difficulty of Paying Living Expenses: Not hard at all   Food Insecurity: No Food Insecurity    Worried About Running Out of Food in the Last Year: Never true    Ran Out of Food in the Last Year: Never true   Transportation Needs: Not on file   Physical Activity: Not on file   Stress: Not on file   Social Connections: Not on file   Intimate Partner Violence: Not on file   Housing Stability: Not on file       Allergies: Allergies   Allergen Reactions    Altace [Ramipril]     Covera-Hs [Verapamil]     Hctz [Hydrochlorothiazide]     Influenza Vaccines      miscarriage       Current Medications:  Current Outpatient Medications   Medication Sig Dispense Refill    valsartan (DIOVAN) 160 MG tablet TAKE 1 TABLET BY MOUTH EVERY DAY 90 tablet 3     No current facility-administered medications for this visit.        Physical Exam:  /64 (Site: Left Upper Arm, Position: Sitting, Cuff Size: Medium Adult)   Pulse 66   Resp 16   Ht 5' 2\" (1.575 m)   Wt 110 lb (49.9 kg) SpO2 96%   BMI 20.12 kg/m²   Wt Readings from Last 3 Encounters:   08/22/22 110 lb (49.9 kg)   04/25/22 115 lb (52.2 kg)   11/29/21 113 lb (51.3 kg)       Physical Exam  Constitutional:       General: She is not in acute distress. Appearance: She is well-developed. HENT:      Head: Normocephalic and atraumatic. Neck:      Vascular: No carotid bruit or JVD. Cardiovascular:      Rate and Rhythm: Normal rate and regular rhythm. Heart sounds: No murmur heard. No friction rub. No gallop. Pulmonary:      Breath sounds: Normal breath sounds. No wheezing or rales. Chest:      Chest wall: No tenderness. Abdominal:      General: Bowel sounds are normal. There is no distension. Palpations: Abdomen is soft. There is no mass. Tenderness: There is no abdominal tenderness. Comments: No abdominal bruit. Musculoskeletal:      Cervical back: Neck supple. Right lower leg: No edema. Left lower leg: No edema. Skin:     General: Skin is warm and dry. Neurological:      Mental Status: She is alert and oriented to person, place, and time.          Laboratory Tests:  Lab Results   Component Value Date    CREATININE 0.9 04/28/2022    BUN 9 04/28/2022     04/28/2022    K 4.0 04/28/2022     04/28/2022    CO2 27 04/28/2022     Lab Results   Component Value Date/Time    MG 1.9 12/18/2014 05:20 AM     Lab Results   Component Value Date    WBC 3.3 (L) 04/28/2022    HGB 14.7 04/28/2022    HCT 46.1 04/28/2022    MCV 99.4 04/28/2022     (L) 04/28/2022     Lab Results   Component Value Date    ALT 12 04/28/2022    AST 28 04/28/2022    ALKPHOS 105 (H) 04/28/2022    BILITOT 0.7 04/28/2022     Lab Results   Component Value Date    TROPONINI <0.01 09/09/2014    TROPONINI <0.01 09/08/2014    TROPONINI <0.01 09/08/2014     Lab Results   Component Value Date    INR 1.0 02/17/2015    INR 1.0 01/06/2015    INR 1.1 09/08/2014    PROTIME 10.9 02/17/2015    PROTIME 10.7 01/06/2015 PROTIME 11.2 09/08/2014     Lab Results   Component Value Date    TSH 0.870 09/19/2016     No results found for: LABA1C  No results found for: EAG  Lab Results   Component Value Date    CHOL 150 09/09/2014     Lab Results   Component Value Date    TRIG 104 09/09/2014     Lab Results   Component Value Date    HDL 44 09/09/2014     Lab Results   Component Value Date    LDLCALC 85 09/09/2014     Lab Results   Component Value Date    LABVLDL 21 09/09/2014         ASSESSMENT / PLAN:  -First-degree AV block with Mobitz 2 AV block: Asymptomatic and on no AV elizabeth blocking agent.  -Hypertension: Controlled. -Status post esophageal dilatation.  -Status post post ERCP. -Chronic back pain. Will arrange for the patient to have a 14-day ZIO XT monitor to rule out significant pauses. Will arrange for the patient to have an echocardiogram to assess for wall motion abnormalities and assess ejection fraction. Will continue Diovan. Patient was asked to seek medical attention if frequent dizziness, lightheadedness or syncope. Patient and patient's son were told that she might need a pacemaker implantation if she becomes symptomatic. Will refer for EP consultation. Follow-up at the office in 6 months. The patient's current medication list, allergies, problem list and results of all previously ordered testing were reviewed at today's visit. Aimee Valenzuela MD , [facility] [place].   [facility]) Cardiology

## 2022-08-22 NOTE — PROGRESS NOTES
14 day Zio xt Monitor was placed per Dr Easton Stacy. Serial number [id number]. Instructions were given & patient verbalized understanding.

## 2022-09-20 ENCOUNTER — TELEPHONE (OUTPATIENT)
Dept: CARDIOLOGY CLINIC | Age: 77
End: 2022-09-20

## 2022-09-20 DIAGNOSIS — I44.1 MOBITZ TYPE 2 SECOND DEGREE AV BLOCK: ICD-10-CM

## 2022-10-25 PROBLEM — I44.1 MOBITZ II: Status: ACTIVE | Noted: 2022-10-25

## 2023-09-21 ENCOUNTER — HOSPITAL ENCOUNTER (OUTPATIENT)
Age: 78
Discharge: HOME | End: 2023-09-21
Payer: MEDICARE

## 2023-09-21 LAB
ALBUMIN SERPL-MCNC: 4.2 G/DL (ref 3.5–5.2)
ALP SERPL-CCNC: 100 U/L (ref 35–104)
ALT SERPL-CCNC: 9 U/L (ref 0–32)
ANION GAP SERPL CALCULATED.3IONS-SCNC: 9 MMOL/L (ref 7–16)
AST SERPL-CCNC: 24 U/L (ref 0–31)
BASOPHILS # BLD: 0.03 K/UL (ref 0–0.2)
BASOPHILS NFR BLD: 1 % (ref 0–2)
BILIRUB SERPL-MCNC: 0.6 MG/DL (ref 0–1.2)
BUN SERPL-MCNC: 15 MG/DL (ref 6–23)
CALCIUM SERPL-MCNC: 9.1 MG/DL (ref 8.6–10.2)
CHLORIDE SERPL-SCNC: 99 MMOL/L (ref 98–107)
CO2 SERPL-SCNC: 27 MMOL/L (ref 22–29)
CREAT SERPL-MCNC: 1 MG/DL (ref 0.5–1)
EOSINOPHIL # BLD: 0.04 K/UL (ref 0.05–0.5)
EOSINOPHILS RELATIVE PERCENT: 1 % (ref 0–6)
ERYTHROCYTE [DISTWIDTH] IN BLOOD BY AUTOMATED COUNT: 12.2 % (ref 11.5–15)
GFR SERPL CREATININE-BSD FRML MDRD: 56 ML/MIN/1.73M2
GLUCOSE SERPL-MCNC: 74 MG/DL (ref 74–99)
HCT VFR BLD AUTO: 39.1 % (ref 34–48)
HGB BLD-MCNC: 12.3 G/DL (ref 11.5–15.5)
IMM GRANULOCYTES # BLD AUTO: <0.03 K/UL (ref 0–0.58)
IMM GRANULOCYTES NFR BLD: 0 % (ref 0–5)
LYMPHOCYTES NFR BLD: 1 K/UL (ref 1.5–4)
LYMPHOCYTES RELATIVE PERCENT: 26 % (ref 20–42)
MCH RBC QN AUTO: 31.5 PG (ref 26–35)
MCHC RBC AUTO-ENTMCNC: 31.5 G/DL (ref 32–34.5)
MCV RBC AUTO: 100.3 FL (ref 80–99.9)
MONOCYTES NFR BLD: 0.42 K/UL (ref 0.1–0.95)
MONOCYTES NFR BLD: 11 % (ref 2–12)
NEUTROPHILS NFR BLD: 61 % (ref 43–80)
NEUTS SEG NFR BLD: 2.35 K/UL (ref 1.8–7.3)
PLATELET, FLUORESCENCE: 114 K/UL (ref 130–450)
PMV BLD AUTO: 10.9 FL (ref 7–12)
POTASSIUM SERPL-SCNC: 4.3 MMOL/L (ref 3.5–5)
PROT SERPL-MCNC: 6.9 G/DL (ref 6.4–8.3)
RBC # BLD AUTO: 3.9 M/UL (ref 3.5–5.5)
SODIUM SERPL-SCNC: 135 MMOL/L (ref 132–146)
WBC OTHER # BLD: 3.9 K/UL (ref 4.5–11.5)

## 2023-09-21 PROCEDURE — 80053 COMPREHEN METABOLIC PANEL: CPT

## 2023-09-21 PROCEDURE — 85025 COMPLETE CBC W/AUTO DIFF WBC: CPT

## 2023-09-21 PROCEDURE — 36415 COLL VENOUS BLD VENIPUNCTURE: CPT

## 2023-11-07 PROBLEM — I44.1 MOBITZ II: Status: RESOLVED | Noted: 2022-10-25 | Resolved: 2023-11-07

## 2023-12-13 ENCOUNTER — TELEPHONE (OUTPATIENT)
Dept: ADMINISTRATIVE | Age: 78
End: 2023-12-13

## 2023-12-13 NOTE — TELEPHONE ENCOUNTER
Current patient Dr. Cardenas has new referral dx Acute diastolic heart failure . Please advise scheduling

## 2024-01-10 ENCOUNTER — HOSPITAL ENCOUNTER (OUTPATIENT)
Age: 79
Discharge: HOME | End: 2024-01-10
Payer: MEDICARE

## 2024-01-10 ENCOUNTER — OFFICE VISIT (OUTPATIENT)
Dept: CARDIOLOGY CLINIC | Age: 79
End: 2024-01-10

## 2024-01-10 VITALS
SYSTOLIC BLOOD PRESSURE: 118 MMHG | BODY MASS INDEX: 24.11 KG/M2 | RESPIRATION RATE: 18 BRPM | HEIGHT: 59 IN | WEIGHT: 119.6 LBS | DIASTOLIC BLOOD PRESSURE: 68 MMHG | OXYGEN SATURATION: 99 % | HEART RATE: 45 BPM

## 2024-01-10 DIAGNOSIS — I50.31 ACUTE DIASTOLIC HEART FAILURE (HCC): ICD-10-CM

## 2024-01-10 DIAGNOSIS — I44.1 SECOND DEGREE AV BLOCK: ICD-10-CM

## 2024-01-10 DIAGNOSIS — I10 ESSENTIAL HYPERTENSION, BENIGN: Primary | ICD-10-CM

## 2024-01-10 LAB
ANION GAP SERPL CALCULATED.3IONS-SCNC: 12 MMOL/L (ref 7–16)
BNP SERPL-MCNC: 3379 PG/ML (ref 0–450)
BUN SERPL-MCNC: 13 MG/DL (ref 6–23)
CALCIUM SERPL-MCNC: 9.3 MG/DL (ref 8.6–10.2)
CHLORIDE SERPL-SCNC: 103 MMOL/L (ref 98–107)
CO2 SERPL-SCNC: 27 MMOL/L (ref 22–29)
CREAT SERPL-MCNC: 1 MG/DL (ref 0.5–1)
GFR SERPL CREATININE-BSD FRML MDRD: 59 ML/MIN/1.73M2
GLUCOSE SERPL-MCNC: 73 MG/DL (ref 74–99)
POTASSIUM SERPL-SCNC: 4.1 MMOL/L (ref 3.5–5)
SODIUM SERPL-SCNC: 142 MMOL/L (ref 132–146)

## 2024-01-10 PROCEDURE — 80048 BASIC METABOLIC PNL TOTAL CA: CPT

## 2024-01-10 PROCEDURE — 83880 ASSAY OF NATRIURETIC PEPTIDE: CPT

## 2024-01-10 PROCEDURE — 36415 COLL VENOUS BLD VENIPUNCTURE: CPT

## 2024-01-10 ASSESSMENT — ENCOUNTER SYMPTOMS
WHEEZING: 0
BACK PAIN: 0
SHORTNESS OF BREATH: 0
ABDOMINAL PAIN: 0
DIARRHEA: 0
BLOOD IN STOOL: 0
VOMITING: 0
COUGH: 0
CONSTIPATION: 0
NAUSEA: 0

## 2024-01-10 NOTE — PROGRESS NOTES
14 day Zio XT  Monitor was placed per Dr Cardenas.  Serial number [id number].  Instructions were given & patient verbalized understanding.

## 2024-01-10 NOTE — PROGRESS NOTES
OUTPATIENT CARDIOLOGY FOLLOW-UP    HPI:    Name: Julita Madrid    Age: 78 y.o.    Primary Care Physician: Anjel Duran DO    Date of Service: 1/10/2024    Chief Complaint:   Chief Complaint   Patient presents with    Hypertension     17 mo ov.        History of present illness :   78-year-old non-smoker female who comes today for follow-up visit.  She is accompanied by her son.  she was seen in my office on 8/22/2022.  She has history of hypertension, Mobitz 2 AV block noted during her last visit,  esophageal stricture, status post balloon dilatation and ulcer.    Patient feels good.  She has been able to do her housework.  She can go up 12 steps but feels tired.  She denies chest discomfort or dyspnea at her level of activity.  She denies palpitations, dizziness, lightheadedness or syncope.  She admits to get lower extremity edema.    EKG done today revealed sinus bradycardia at 45 bpm, probable Wenckebach second-degree block or 2: 1 Mobitz 2 second-degree block, left atrial enlargement, left anterior fascicular block, anterior myocardial infarction of unknown age and left ventricular hypertrophy.      Review of Systems:   Review of Systems   Constitutional:  Positive for fatigue. Negative for chills and fever.   HENT:  Negative for nosebleeds.    Respiratory:  Negative for cough, shortness of breath and wheezing.    Cardiovascular:  Positive for leg swelling.   Gastrointestinal:  Negative for abdominal pain, blood in stool, constipation, diarrhea, nausea and vomiting.   Genitourinary:  Negative for dysuria and hematuria.   Musculoskeletal:  Negative for back pain, joint swelling and myalgias.   Neurological:  Negative for syncope and light-headedness.   Psychiatric/Behavioral:  The patient is not nervous/anxious.           Past Medical History:  Past Medical History:   Diagnosis Date    Bradycardia 3/6/2017    Common bile duct dilation     GERD (gastroesophageal reflux disease)     Hypertension

## 2024-01-15 ENCOUNTER — TELEPHONE (OUTPATIENT)
Dept: CARDIOLOGY | Age: 79
End: 2024-01-15

## 2024-01-15 PROBLEM — N18.30 CHRONIC RENAL DISEASE, STAGE III (HCC): Status: ACTIVE | Noted: 2024-01-15

## 2024-01-15 NOTE — TELEPHONE ENCOUNTER
Spoke to the  Anson on the phone to schedule his wife's echo. He said he will have his son Anson contact us to schedule her since he will be taking her to the test. Phone number provided.

## 2024-02-16 ENCOUNTER — TELEPHONE (OUTPATIENT)
Dept: CARDIOLOGY CLINIC | Age: 79
End: 2024-02-16

## 2024-02-16 DIAGNOSIS — R94.31 ABNORMAL PATIENT-ACTIVATED CARDIAC EVENT MONITOR: Primary | ICD-10-CM

## 2024-02-16 NOTE — TELEPHONE ENCOUNTER
ON 2/8/24 I CALLED AND SPOKE WITH ANA MARIA'S SONFLAKO BOSTON.  I GAVE HIM THE RESULTS OF HER EVENT MONITOR AND TOLD HIM THAT DR PATRICK WAS REFERRING HER TO AN ELECTROPHYSIOLOGIST.  I EXPLAINED THE EP CHOICES TO LUDY AND HE SAID TO SCHEDULE HER WITH AN EP IN [facility].  I PUT IN THE REFERRAL TO DR AVILA.  RLL

## 2024-03-08 ENCOUNTER — TELEPHONE (OUTPATIENT)
Dept: CARDIOLOGY CLINIC | Age: 79
End: 2024-03-08

## 2024-03-08 DIAGNOSIS — I44.1 SECOND DEGREE AV BLOCK: ICD-10-CM

## 2024-03-25 ENCOUNTER — HOSPITAL ENCOUNTER (OUTPATIENT)
Dept: CARDIOLOGY | Age: 79
Discharge: HOME | End: 2024-03-27
Attending: INTERNAL MEDICINE
Payer: MEDICARE

## 2024-03-25 VITALS
HEIGHT: 62 IN | BODY MASS INDEX: 21.71 KG/M2 | WEIGHT: 118 LBS | SYSTOLIC BLOOD PRESSURE: 118 MMHG | DIASTOLIC BLOOD PRESSURE: 68 MMHG

## 2024-03-25 DIAGNOSIS — I44.1 SECOND DEGREE AV BLOCK: ICD-10-CM

## 2024-03-25 PROCEDURE — 93306 TTE W/DOPPLER COMPLETE: CPT

## 2024-03-26 LAB
ECHO AV AREA PEAK VELOCITY: 1.7 CM2
ECHO AV AREA VTI: 1.6 CM2
ECHO AV AREA/BSA PEAK VELOCITY: 1.1 CM2/M2
ECHO AV AREA/BSA VTI: 1 CM2/M2
ECHO AV MEAN GRADIENT: 9 MMHG
ECHO AV MEAN VELOCITY: 1.5 M/S
ECHO AV PEAK GRADIENT: 16 MMHG
ECHO AV PEAK VELOCITY: 2 M/S
ECHO AV VELOCITY RATIO: 0.45
ECHO AV VTI: 51.9 CM
ECHO BSA: 1.53 M2
ECHO EST RA PRESSURE: 3 MMHG
ECHO LA DIAMETER INDEX: 2.09 CM/M2
ECHO LA DIAMETER: 3.2 CM
ECHO LA VOL A-L A2C: 25 ML (ref 22–52)
ECHO LA VOL A-L A4C: 52 ML (ref 22–52)
ECHO LA VOL MOD A2C: 25 ML (ref 22–52)
ECHO LA VOL MOD A4C: 48 ML (ref 22–52)
ECHO LA VOLUME AREA LENGTH: 40 ML
ECHO LA VOLUME INDEX A-L A2C: 16 ML/M2 (ref 16–34)
ECHO LA VOLUME INDEX A-L A4C: 34 ML/M2 (ref 16–34)
ECHO LA VOLUME INDEX AREA LENGTH: 26 ML/M2 (ref 16–34)
ECHO LA VOLUME INDEX MOD A2C: 16 ML/M2 (ref 16–34)
ECHO LA VOLUME INDEX MOD A4C: 31 ML/M2 (ref 16–34)
ECHO LV FRACTIONAL SHORTENING: 29 % (ref 28–44)
ECHO LV INTERNAL DIMENSION DIASTOLE INDEX: 3.2 CM/M2
ECHO LV INTERNAL DIMENSION DIASTOLIC: 4.9 CM (ref 3.9–5.3)
ECHO LV INTERNAL DIMENSION SYSTOLIC INDEX: 2.29 CM/M2
ECHO LV INTERNAL DIMENSION SYSTOLIC: 3.5 CM
ECHO LV IVSD: 1 CM (ref 0.6–0.9)
ECHO LV MASS 2D: 164.3 G (ref 67–162)
ECHO LV MASS INDEX 2D: 107.4 G/M2 (ref 43–95)
ECHO LV POSTERIOR WALL DIASTOLIC: 0.9 CM (ref 0.6–0.9)
ECHO LV RELATIVE WALL THICKNESS RATIO: 0.37
ECHO LVOT AREA: 3.8 CM2
ECHO LVOT AV VTI INDEX: 0.44
ECHO LVOT DIAM: 2.2 CM
ECHO LVOT MEAN GRADIENT: 2 MMHG
ECHO LVOT PEAK GRADIENT: 3 MMHG
ECHO LVOT PEAK VELOCITY: 0.9 M/S
ECHO LVOT STROKE VOLUME INDEX: 56.9 ML/M2
ECHO LVOT SV: 87 ML
ECHO LVOT VTI: 22.9 CM
ECHO MV A VELOCITY: 1.01 M/S
ECHO MV AREA PHT: 4.2 CM2
ECHO MV AREA VTI: 2.8 CM2
ECHO MV E DECELERATION TIME (DT): 207.9 MS
ECHO MV E VELOCITY: 0.8 M/S
ECHO MV E/A RATIO: 0.79
ECHO MV LVOT VTI INDEX: 1.36
ECHO MV MAX VELOCITY: 1.2 M/S
ECHO MV MEAN GRADIENT: 2 MMHG
ECHO MV MEAN VELOCITY: 0.6 M/S
ECHO MV PEAK GRADIENT: 6 MMHG
ECHO MV PRESSURE HALF TIME (PHT): 52.6 MS
ECHO MV VTI: 31.1 CM
ECHO PV MAX VELOCITY: 1 M/S
ECHO PV MEAN GRADIENT: 2 MMHG
ECHO PV MEAN VELOCITY: 0.6 M/S
ECHO PV PEAK GRADIENT: 4 MMHG
ECHO PV VTI: 20.7 CM
ECHO RIGHT VENTRICULAR SYSTOLIC PRESSURE (RVSP): 36 MMHG
ECHO RV INTERNAL DIMENSION: 2.3 CM
ECHO RV TAPSE: 2 CM (ref 1.7–?)
ECHO TV REGURGITANT MAX VELOCITY: 2.89 M/S
ECHO TV REGURGITANT PEAK GRADIENT: 33 MMHG

## 2024-03-26 PROCEDURE — 93306 TTE W/DOPPLER COMPLETE: CPT | Performed by: INTERNAL MEDICINE

## 2024-05-01 PROBLEM — D69.6 THROMBOCYTOPENIA, UNSPECIFIED (HCC): Status: ACTIVE | Noted: 2024-05-01

## 2024-05-01 PROBLEM — I50.31 ACUTE DIASTOLIC HEART FAILURE (HCC): Status: RESOLVED | Noted: 2024-01-10 | Resolved: 2024-05-01

## 2024-05-21 ENCOUNTER — TELEPHONE (OUTPATIENT)
Dept: NON INVASIVE DIAGNOSTICS | Age: 79
End: 2024-05-21

## 2024-05-21 NOTE — TELEPHONE ENCOUNTER
Called and left message for patient to call back and schedule an appointment from a referral from Dr. Cardenas

## 2024-07-25 ENCOUNTER — OFFICE VISIT (OUTPATIENT)
Dept: CARDIOLOGY CLINIC | Age: 79
End: 2024-07-25
Payer: MEDICARE

## 2024-07-25 VITALS
HEIGHT: 62 IN | SYSTOLIC BLOOD PRESSURE: 112 MMHG | WEIGHT: 112.6 LBS | HEART RATE: 61 BPM | BODY MASS INDEX: 20.72 KG/M2 | OXYGEN SATURATION: 97 % | RESPIRATION RATE: 17 BRPM | DIASTOLIC BLOOD PRESSURE: 62 MMHG

## 2024-07-25 DIAGNOSIS — I10 ESSENTIAL HYPERTENSION, BENIGN: ICD-10-CM

## 2024-07-25 DIAGNOSIS — I50.31 ACUTE DIASTOLIC HEART FAILURE (HCC): Primary | ICD-10-CM

## 2024-07-25 DIAGNOSIS — I44.1 SECOND DEGREE AV BLOCK: ICD-10-CM

## 2024-07-25 PROCEDURE — 1036F TOBACCO NON-USER: CPT | Performed by: INTERNAL MEDICINE

## 2024-07-25 PROCEDURE — 3074F SYST BP LT 130 MM HG: CPT | Performed by: INTERNAL MEDICINE

## 2024-07-25 PROCEDURE — G8427 DOCREV CUR MEDS BY ELIG CLIN: HCPCS | Performed by: INTERNAL MEDICINE

## 2024-07-25 PROCEDURE — 93000 ELECTROCARDIOGRAM COMPLETE: CPT | Performed by: INTERNAL MEDICINE

## 2024-07-25 PROCEDURE — 1090F PRES/ABSN URINE INCON ASSESS: CPT | Performed by: INTERNAL MEDICINE

## 2024-07-25 PROCEDURE — G8420 CALC BMI NORM PARAMETERS: HCPCS | Performed by: INTERNAL MEDICINE

## 2024-07-25 PROCEDURE — 1123F ACP DISCUSS/DSCN MKR DOCD: CPT | Performed by: INTERNAL MEDICINE

## 2024-07-25 PROCEDURE — G8400 PT W/DXA NO RESULTS DOC: HCPCS | Performed by: INTERNAL MEDICINE

## 2024-07-25 PROCEDURE — 3078F DIAST BP <80 MM HG: CPT | Performed by: INTERNAL MEDICINE

## 2024-07-25 PROCEDURE — 99214 OFFICE O/P EST MOD 30 MIN: CPT | Performed by: INTERNAL MEDICINE

## 2024-07-25 ASSESSMENT — ENCOUNTER SYMPTOMS
COUGH: 0
SHORTNESS OF BREATH: 0
BLOOD IN STOOL: 0
NAUSEA: 0
DIARRHEA: 0
CONSTIPATION: 0
WHEEZING: 0
BACK PAIN: 0
ABDOMINAL PAIN: 0
VOMITING: 0

## 2024-07-25 NOTE — PROGRESS NOTES
OUTPATIENT CARDIOLOGY FOLLOW-UP    HPI:    Name: Julita Madrid    Age: 78 y.o.    Primary Care Physician: Anjel Duran DO    Date of Service: 7/25/2024    Chief Complaint: 6 months follow-up visit.       History of present illness :   78-year-old non-smoker female who comes today for 6-months follow-up visit.  She is accompanied by her son. .  She has history of hypertension, Mobitz 2 AV block, esophageal stricture, status post balloon dilatation and ulcer.     Patient has not been active.  She denies chest discomfort or dyspnea at her minimal level of activity.  She feels tired.  She feels dizzy at times but denies syncope.  She denies palpitations.  She denies lower extremity edema.    EKG done today revealed sinus rhythm at 61 bpm with first-degree AV block with WA interval 246 ms, left atrial enlargement, anteroseptal myocardial infarction of unknown age, high lateral myocardial infarction of unknown age, left ventricular hypertrophy with nonspecific ST wave changes.    Review of Systems:   Review of Systems   Constitutional:  Positive for fatigue. Negative for chills and fever.   HENT:  Negative for nosebleeds.    Respiratory:  Negative for cough, shortness of breath and wheezing.    Gastrointestinal:  Negative for abdominal pain, blood in stool, constipation, diarrhea, nausea and vomiting.   Genitourinary:  Negative for dysuria and hematuria.   Musculoskeletal:  Negative for back pain, joint swelling and myalgias.        Aching.   Neurological:  Positive for dizziness. Negative for syncope and light-headedness.   Psychiatric/Behavioral:  The patient is not nervous/anxious.           Past Medical History:  Past Medical History:   Diagnosis Date    Bradycardia 3/6/2017    Common bile duct dilation     GERD (gastroesophageal reflux disease)     Hypertension     Weight loss, unintentional        Past Surgical History:  Past Surgical History:   Procedure Laterality Date    CHOLECYSTECTOMY      COLONOSCOPY  Subjective


Progress Note Date: 06/21/18


Progress note  being dictated for Dr. Burris














Interval history: This is a 58-year-old gentleman admitted with partial  bowel 

obstruction involving both small and large bowel and multiple other medical 

issues.  NG tube and Londono catheter discontinued yesterday.  Reports multiple 

bowel movements, yet abdomen remains distended.  Denies abdominal tenderness.  

Follow-up Abdominal x-ray reporting continued marked diffuse small bowel 

dilatation, prominent air throughout the right;, suggestive of severe ileus 

rather than small bowel obstruction and no free intraperitoneal air below the 

hemidiaphragms .complains of shortness of breath, suspect related to abdominal 

distention, chest x-ray ordered. Voiding without difficulty.  Complains of 

nausea, on clear liquid diet as per surgery.  Afebrile.  Blood cultures 

negative.











06/19/2018 increased abdominal pain during the night and NG tube reinserted as 

per surgery. Abdomen remains firm and distended, with 450 MLS dark brownish 

drainage over the last 8 hours.positive fluctuating abdominal pain.Diagnostic 

laparoscopic lysis of adhesions recommend per surgery.  Aspirin, Plavix placed 

on hold, maintained on Lovenox.  PPN nitiated.  Maintained on IV fluid 

hydration. Cardiology consulted regarding anticoagulation recommendations/ 

surgery.














06/20/2018 receiving IV fluid hydration. PPN unavailable from pharmacy, TPN 

ordered as per surgery.  Scheduled for PICC line placement. Patient ambulating 

to and from bathroom, complaining of exertional dyspnea.  Chest x-ray reporting 

possible early pneumonia right lung base, mild atelectasis. Minimal improvement 

in abdominal distention.  Reports mild improvement in abdominal pain.  NG  

drainage of around 800ml /8hrs. Telemetry reporting sinus rhythm to sinus tach, 

low 100s.  Plavix and aspirin remain on hold for upcoming surgery, Saturday.  

Afebrile








6/21/18 NG tube "fell out last night".  Minimal improvement in abdominal pain, 

abdominal distention.  Mild nausea, no bowel movement.  No emesis.  Awaiting 

PICC line placement.  Afebrile, maintaining O2 sats of 95-97% on room air.





Review of systems:





CONSTITUTIONAL: No fever, positive fatigue. 


HEENT: No recent visual problems or hearing problems. Denied any sore throat. 


CARDIOVASCULAR: No chest pain,  no palpitations, no syncope. 


PULMONARY: Exertional shortness of breath, no cough, no hemoptysis. 


GASTROINTESTINAL: No diarrhea, positive nausea, no vomiting, positive abdominal 

pain. 


NEUROLOGICAL: No headaches, generalized weakness, no numbness. 


HEMATOLOGICAL: Denies any bleeding or petechiae. 


GENITOURINARY: Denies any burning micturition, frequency, or urgency.  


MUSCULOSKELETAL/RHEUMATOLOGICAL: Denies any joint pain, swelling, or any muscle 

pain. 


ENDOCRINE: Denies any polyuria or polydipsia.


PSYCHIATRIC: No anxiety, no depression





The rest of the 14 point review of systems is negative

















Active Medications





Hydrocodone Bitart/Acetaminophen (Norco 7.5-325)  1 each PO Q6H PRN


   PRN Reason: Pain


   Last Admin: 06/19/18 09:09 Dose:  1 each


Atorvastatin Calcium (Lipitor)  80 mg PO QAM [facility]


   Last Admin: 06/21/18 07:27 Dose:  80 mg


Benzocaine/Menthol (Cepacol Lozenge)  1 each MUCOUS MEM Q4HR PRN


   PRN Reason: Sore Throat


   Last Admin: 06/16/18 10:28 Dose:  1 each


Enoxaparin Sodium (Lovenox)  30 mg SQ Q12HR [facility]


   Last Admin: 06/21/18 21:51 Dose:  Not Given


Famotidine (Pepcid)  20 mg IV Q12HR [facility]


   Last Admin: 06/21/18 21:52 Dose:  20 mg


Hydromorphone HCl (Dilaudid)  1 mg IVP Q4HR PRN


   PRN Reason: Pain


   Last Admin: 06/21/18 21:45 Dose:  1 mg


Levofloxacin 750 mg/ IV (Solution)  150 mls @ 100 mls/hr IVPB Q24H [facility]


   Last Admin: 06/21/18 07:27 Dose:  100 mls/hr


Metronidazole 500 mg/ IV (Solution)  100 mls @ 100 mls/hr IVPB Q6HR [facility]


   Last Admin: 06/21/18 17:21 Dose:  100 mls/hr


Sodium Chloride (Saline 0.9%)  1,000 mls @ 125 mls/hr IV .Q8H [facility]


   Last Admin: 06/21/18 21:52 Dose:  125 mls/hr


Parenteral Vitamin Supplement 10 ml/ Chromium/Copper/Manganese/Seleni/Zn 1 ml/

Total Parenteral Nutrition  1,551 mls @ 64 mls/hr IV .Q24H [facility]


   Last Admin: 06/21/18 13:22 Dose:  64 mls/hr


Dextrose/Sodium Chloride (Dextrose 5%-Ns Iv Soln)  1,000 mls @ 20 mls/hr IV 

.Q24H [facility]


   Last Admin: 06/21/18 07:26 Dose:  Not Given


Lisinopril (Zestril)  5 mg PO DAILY [facility]


   Last Admin: 06/21/18 07:27 Dose:  5 mg


Lorazepam (Ativan)  1 mg IV Q2HR PRN


   PRN Reason: CIWA 8 or 9


   Last Admin: 06/19/18 06:13 Dose:  1 mg


Lorazepam (Ativan)  1 mg IV Q1HR PRN


   PRN Reason: CIWA 10 to 15


Metoclopramide HCl (Reglan)  5 mg IVP Q6HR PRN


   PRN Reason: Nausea And Vomiting


   Last Admin: 06/20/18 19:45 Dose:  5 mg


Metoprolol Tartrate (Lopressor)  25 mg PO BID [facility]


   Last Admin: 06/21/18 21:50 Dose:  25 mg


Miscellaneous Information (Potassium Per Protocol)  1 each MISCELLANE DAILY PRN

; Protocol


   PRN Reason: Per Protocol


Naloxone HCl (Narcan)  0.2 mg IV Q2M PRN


   PRN Reason: Opioid Reversal
































  












































Objective





- Vital Signs


Vital signs: 


 Vital Signs











Temp  97.5 F L  06/21/18 06:24


 


Pulse  88   06/21/18 06:24


 


Resp  17   06/21/18 06:24


 


BP  158/97   06/21/18 06:24


 


Pulse Ox  95   06/21/18 06:24








 Intake & Output











 06/20/18 06/21/18 06/21/18





 18:59 06:59 18:59


 


Intake Total 1400  


 


Output Total 2175 800 


 


Balance -775 -800 


 


Intake:   


 


  Intake, IV Titration 1400  





  Amount   


 


    Potassium Chloride 10 meq 200  





    In Water For Injection 1   





    100ml.bag @ 100 mls/hr   





    IVPB Q1H [facility] Rx#:   





    [id number]   


 


    Sodium Chloride 0.9% 1, 1000  





    000 ml @ 125 mls/hr IV .   





    Q8H [facility] Rx#:[id number]   


 


    metroNIDAZOLE-NS  200  





    mg In Saline 1 100ml.bag   





    @ 100 mls/hr IVPB Q6HR   





    [facility] Rx#:[id number]   


 


Output:   


 


  Gastric Drainage 550  


 


  Urine 1625 800 


 


Other:   


 


  Voiding Method  Toilet Toilet


 


  # Bowel Movements 0  














- Exam


PHYSICAL EXAM:


VITAL SIGNS: [As above]


GENERAL: Sitting up in bed, tired appearing


HEENT:  Conjunctivae normal. eyes normal.  Oral mucosa dry. 


NECK:  No JVD. No thyroid enlargement. No LNs


CARDIOVASCULAR:  S1, S2 muffled. No murmur


RESPIRATION: Breath sounds diminished in the bases. No rhonchi or crackles.  No 

wheezing


ABDOMEN:  Firm, distended, diffuse tenderness. Hypoactive bowel sounds.  Mild 

guarding. no masses palpable.


LEGS:  No edema. no swelling 


PSYCHIATRY: Alert and oriented -3, mood and affect normal.


NERVOUS SYSTEM:  Cranial N 2-12 grossly normal. Moves all 4 limbs.  Diffuse 

weakness No focal deficits. 














- Labs


CBC & Chem 7: 


 06/20/18 07:47





 06/21/18 07:26


Labs: 


 Abnormal Lab Results - Last 24 Hours (Table)











  06/21/18 06/21/18 06/21/18 Range/Units





  00:24 00:53 03:38 


 


BUN     (9-20)  mg/dL


 


Creatinine     (0.66-1.25)  mg/dL


 


POC Glucose (mg/dL)  71 L  72 L  68 L  (75-99)  mg/dL


 


Calcium     (8.4-10.2)  mg/dL














  06/21/18 06/21/18 06/21/18 Range/Units





  04:18 06:11 06:49 


 


BUN     (9-20)  mg/dL


 


Creatinine     (0.66-1.25)  mg/dL


 


POC Glucose (mg/dL)  109 H  70 L  118 H  (75-99)  mg/dL


 


Calcium     (8.4-10.2)  mg/dL














  06/21/18 Range/Units





  07:26 


 


BUN  3 L  (9-20)  mg/dL


 


Creatinine  0.59 L  (0.66-1.25)  mg/dL


 


POC Glucose (mg/dL)   (75-99)  mg/dL


 


Calcium  8.2 L  (8.4-10.2)  mg/dL








 Microbiology - Last 24 Hours (Table)











 06/14/18 09:10 Blood Culture - Final





 Blood    No Growth after 144 hours














Assessment and Plan


Assessment: 


1.  Acute severe ileus with  Acute partial bowel obstruction, involving the 

small and large bowel with possible mild acute diverticulitis, development 

severly dilated small bowel per computed tomography scan


2.  Recent colonoscopy 2/2018 reporting scattered diverticulosis of sigmoid 

colon with severe tortuous sigmoid colon.


3.  CAD, stenting in October 2017 on dual antiplatelet therapy-currently on hold


4.  Chronic daily alcohol use


5.  PICC line placement pending.








Plan: Continue current medication regime ,monitoring and symptomatic treatment.

  PICC line placement pending.  Maintain IV fluid hydration, TPN, antibiotics.  

Aggressive pulmonary toileting with incentive spirometer reinforced.  Pain 

management as per surgery.  Anticoagulation remains on hold as per cardiology 

recommendation with surgery tentatively scheduled for Saturday.CIWA protocol in 

place. Prognosis guarded given multiple complex medical issues.














The impression and plan of care has been dictated as directed.





:


I performed a history and examination of this patient,  discussed the same with 

the dictator.  I agree with the dictator's note ,documented as a scribe.  Any 

additional findings or plans will be noted.

## 2024-11-07 ENCOUNTER — HOSPITAL ENCOUNTER (OUTPATIENT)
Age: 79
Discharge: HOME | End: 2024-11-07
Payer: MEDICARE

## 2024-11-07 DIAGNOSIS — D69.6 THROMBOCYTOPENIA, UNSPECIFIED (HCC): ICD-10-CM

## 2024-11-07 DIAGNOSIS — N18.30 STAGE 3 CHRONIC KIDNEY DISEASE, UNSPECIFIED WHETHER STAGE 3A OR 3B CKD (HCC): ICD-10-CM

## 2024-11-07 DIAGNOSIS — R00.1 BRADYCARDIA: ICD-10-CM

## 2024-11-07 LAB
ANION GAP SERPL CALCULATED.3IONS-SCNC: 8 MMOL/L (ref 7–16)
BUN SERPL-MCNC: 12 MG/DL (ref 6–23)
CALCIUM SERPL-MCNC: 8.9 MG/DL (ref 8.6–10.2)
CHLORIDE SERPL-SCNC: 104 MMOL/L (ref 98–107)
CO2 SERPL-SCNC: 28 MMOL/L (ref 22–29)
CREAT SERPL-MCNC: 1 MG/DL (ref 0.5–1)
ERYTHROCYTE [DISTWIDTH] IN BLOOD BY AUTOMATED COUNT: 12.1 % (ref 11.5–15)
GFR, ESTIMATED: 54 ML/MIN/1.73M2
GLUCOSE SERPL-MCNC: 115 MG/DL (ref 74–99)
HCT VFR BLD AUTO: 41.6 % (ref 34–48)
HGB BLD-MCNC: 13.1 G/DL (ref 11.5–15.5)
MAGNESIUM SERPL-MCNC: 2.3 MG/DL (ref 1.6–2.6)
MCH RBC QN AUTO: 32.3 PG (ref 26–35)
MCHC RBC AUTO-ENTMCNC: 31.5 G/DL (ref 32–34.5)
MCV RBC AUTO: 102.5 FL (ref 80–99.9)
PLATELET, FLUORESCENCE: 105 K/UL (ref 130–450)
PMV BLD AUTO: 10.9 FL (ref 7–12)
POTASSIUM SERPL-SCNC: 4.3 MMOL/L (ref 3.5–5)
RBC # BLD AUTO: 4.06 M/UL (ref 3.5–5.5)
SODIUM SERPL-SCNC: 140 MMOL/L (ref 132–146)
TSH SERPL DL<=0.05 MIU/L-ACNC: 0.72 UIU/ML (ref 0.27–4.2)
WBC OTHER # BLD: 4.5 K/UL (ref 4.5–11.5)

## 2024-11-07 PROCEDURE — 83735 ASSAY OF MAGNESIUM: CPT

## 2024-11-07 PROCEDURE — 80048 BASIC METABOLIC PNL TOTAL CA: CPT

## 2024-11-07 PROCEDURE — 84443 ASSAY THYROID STIM HORMONE: CPT

## 2024-11-07 PROCEDURE — 36415 COLL VENOUS BLD VENIPUNCTURE: CPT

## 2024-11-07 PROCEDURE — 85027 COMPLETE CBC AUTOMATED: CPT

## 2025-02-21 ENCOUNTER — APPOINTMENT (OUTPATIENT)
Dept: CT IMAGING | Age: 80
DRG: 522 | End: 2025-02-21
Attending: EMERGENCY MEDICINE
Payer: MEDICARE

## 2025-02-21 ENCOUNTER — HOSPITAL ENCOUNTER (INPATIENT)
Age: 80
LOS: 7 days | Discharge: HOME | DRG: 522 | End: 2025-02-28
Attending: EMERGENCY MEDICINE | Admitting: INTERNAL MEDICINE
Payer: MEDICARE

## 2025-02-21 ENCOUNTER — APPOINTMENT (OUTPATIENT)
Dept: GENERAL RADIOLOGY | Age: 80
DRG: 522 | End: 2025-02-21
Payer: MEDICARE

## 2025-02-21 DIAGNOSIS — S72.002A CLOSED FRACTURE OF NECK OF LEFT FEMUR, INITIAL ENCOUNTER (HCC): ICD-10-CM

## 2025-02-21 DIAGNOSIS — I44.1 SECOND DEGREE ATRIOVENTRICULAR BLOCK, MOBITZ (TYPE) I: ICD-10-CM
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
## 2025-02-21 DIAGNOSIS — S09.90XA CLOSED HEAD INJURY, INITIAL ENCOUNTER: ICD-10-CM

## 2025-02-21 DIAGNOSIS — S72.002A CLOSED FRACTURE OF LEFT HIP, INITIAL ENCOUNTER (HCC): ICD-10-CM

## 2025-02-21 PROBLEM — S72.145K: Status: ACTIVE | Noted: 2025-02-21

## 2025-02-21 LAB
ABO + RH BLD: NORMAL
ANION GAP SERPL CALCULATED.3IONS-SCNC: 8 MMOL/L (ref 7–16)
ARM BAND NUMBER: NORMAL
BASOPHILS # BLD: 0.02 K/UL (ref 0–0.2)
BASOPHILS NFR BLD: 0 % (ref 0–2)
BLOOD BANK SAMPLE EXPIRATION: NORMAL
BLOOD GROUP ANTIBODIES SERPL: NEGATIVE
BUN SERPL-MCNC: 17 MG/DL (ref 6–23)
CALCIUM SERPL-MCNC: 9 MG/DL (ref 8.6–10.2)
CHLORIDE SERPL-SCNC: 105 MMOL/L (ref 98–107)
CO2 SERPL-SCNC: 26 MMOL/L (ref 22–29)
CREAT SERPL-MCNC: 1 MG/DL (ref 0.5–1)
EOSINOPHIL # BLD: 0.03 K/UL (ref 0.05–0.5)
EOSINOPHILS RELATIVE PERCENT: 0 % (ref 0–6)
ERYTHROCYTE [DISTWIDTH] IN BLOOD BY AUTOMATED COUNT: 12.7 % (ref 11.5–15)
GFR, ESTIMATED: 59 ML/MIN/1.73M2
GLUCOSE SERPL-MCNC: 106 MG/DL (ref 74–99)
HCT VFR BLD AUTO: 41.1 % (ref 34–48)
HGB BLD-MCNC: 13.4 G/DL (ref 11.5–15.5)
IMM GRANULOCYTES # BLD AUTO: 0.09 K/UL (ref 0–0.58)
IMM GRANULOCYTES NFR BLD: 1 % (ref 0–5)
INR PPP: 1
LYMPHOCYTES NFR BLD: 0.87 K/UL (ref 1.5–4)
LYMPHOCYTES RELATIVE PERCENT: 12 % (ref 20–42)
MCH RBC QN AUTO: 31.9 PG (ref 26–35)
MCHC RBC AUTO-ENTMCNC: 32.6 G/DL (ref 32–34.5)
MCV RBC AUTO: 97.9 FL (ref 80–99.9)
MONOCYTES NFR BLD: 0.29 K/UL (ref 0.1–0.95)
MONOCYTES NFR BLD: 4 % (ref 2–12)
NEUTROPHILS NFR BLD: 83 % (ref 43–80)
NEUTS SEG NFR BLD: 6.24 K/UL (ref 1.8–7.3)
PARTIAL THROMBOPLASTIN TIME: 26.6 SEC (ref 24.5–35.1)
PLATELET CONFIRMATION: NORMAL
PLATELET, FLUORESCENCE: 94 K/UL (ref 130–450)
PMV BLD AUTO: 11 FL (ref 7–12)
POTASSIUM SERPL-SCNC: 4.2 MMOL/L (ref 3.5–5)
PROTHROMBIN TIME: 11.2 SEC (ref 9.3–12.4)
RBC # BLD AUTO: 4.2 M/UL (ref 3.5–5.5)
SODIUM SERPL-SCNC: 139 MMOL/L (ref 132–146)
WBC OTHER # BLD: 7.5 K/UL (ref 4.5–11.5)

## 2025-02-21 PROCEDURE — 99221 1ST HOSP IP/OBS SF/LOW 40: CPT | Performed by: STUDENT IN AN ORGANIZED HEALTH CARE EDUCATION/TRAINING PROGRAM

## 2025-02-21 PROCEDURE — 6360000002 HC RX W HCPCS: Performed by: NURSE PRACTITIONER

## 2025-02-21 PROCEDURE — 99285 EMERGENCY DEPT VISIT HI MDM: CPT

## 2025-02-21 PROCEDURE — 2140000000 HC CCU INTERMEDIATE R&B

## 2025-02-21 PROCEDURE — 85730 THROMBOPLASTIN TIME PARTIAL: CPT

## 2025-02-21 PROCEDURE — 6360000002 HC RX W HCPCS: Performed by: EMERGENCY MEDICINE

## 2025-02-21 PROCEDURE — 86850 RBC ANTIBODY SCREEN: CPT

## 2025-02-21 PROCEDURE — 70450 CT HEAD/BRAIN W/O DYE: CPT

## 2025-02-21 PROCEDURE — 72125 CT NECK SPINE W/O DYE: CPT

## 2025-02-21 PROCEDURE — 86901 BLOOD TYPING SEROLOGIC RH(D): CPT

## 2025-02-21 PROCEDURE — 73502 X-RAY EXAM HIP UNI 2-3 VIEWS: CPT

## 2025-02-21 PROCEDURE — 85610 PROTHROMBIN TIME: CPT

## 2025-02-21 PROCEDURE — 2500000003 HC RX 250 WO HCPCS: Performed by: NURSE PRACTITIONER

## 2025-02-21 PROCEDURE — 80048 BASIC METABOLIC PNL TOTAL CA: CPT

## 2025-02-21 PROCEDURE — 85025 COMPLETE CBC W/AUTO DIFF WBC: CPT

## 2025-02-21 PROCEDURE — 86900 BLOOD TYPING SEROLOGIC ABO: CPT

## 2025-02-21 PROCEDURE — 73552 X-RAY EXAM OF FEMUR 2/>: CPT

## 2025-02-21 PROCEDURE — 96374 THER/PROPH/DIAG INJ IV PUSH: CPT

## 2025-02-21 RX ORDER — VALSARTAN 160 MG/1
160 TABLET ORAL DAILY
Status: DISCONTINUED | OUTPATIENT
Start: 2025-02-22 | End: 2025-02-28 | Stop reason: HOSPADM

## 2025-02-21 RX ORDER — ONDANSETRON 4 MG/1
4 TABLET, ORALLY DISINTEGRATING ORAL EVERY 8 HOURS PRN
Status: DISCONTINUED | OUTPATIENT
Start: 2025-02-21 | End: 2025-02-28 | Stop reason: HOSPADM

## 2025-02-21 RX ORDER — SODIUM CHLORIDE 0.9 % (FLUSH) 0.9 %
10 SYRINGE (ML) INJECTION PRN
Status: DISCONTINUED | OUTPATIENT
Start: 2025-02-21 | End: 2025-02-28 | Stop reason: HOSPADM

## 2025-02-21 RX ORDER — SODIUM CHLORIDE 9 MG/ML
INJECTION, SOLUTION INTRAVENOUS PRN
Status: DISCONTINUED | OUTPATIENT
Start: 2025-02-21 | End: 2025-02-28 | Stop reason: HOSPADM

## 2025-02-21 RX ORDER — FENTANYL CITRATE 50 UG/ML
25 INJECTION, SOLUTION INTRAMUSCULAR; INTRAVENOUS
Status: DISCONTINUED | OUTPATIENT
Start: 2025-02-21 | End: 2025-02-28 | Stop reason: HOSPADM

## 2025-02-21 RX ORDER — POTASSIUM CHLORIDE 1500 MG/1
40 TABLET, EXTENDED RELEASE ORAL PRN
Status: DISCONTINUED | OUTPATIENT
Start: 2025-02-21 | End: 2025-02-22

## 2025-02-21 RX ORDER — ACETAMINOPHEN 325 MG/1
650 TABLET ORAL EVERY 6 HOURS PRN
Status: DISCONTINUED | OUTPATIENT
Start: 2025-02-21 | End: 2025-02-28 | Stop reason: HOSPADM

## 2025-02-21 RX ORDER — POTASSIUM CHLORIDE 7.45 MG/ML
10 INJECTION INTRAVENOUS PRN
Status: DISCONTINUED | OUTPATIENT
Start: 2025-02-21 | End: 2025-02-22

## 2025-02-21 RX ORDER — ENOXAPARIN SODIUM 100 MG/ML
40 INJECTION SUBCUTANEOUS DAILY
Status: DISCONTINUED | OUTPATIENT
Start: 2025-02-22 | End: 2025-02-25

## 2025-02-21 RX ORDER — MAGNESIUM SULFATE IN WATER 40 MG/ML
2000 INJECTION, SOLUTION INTRAVENOUS PRN
Status: DISCONTINUED | OUTPATIENT
Start: 2025-02-21 | End: 2025-02-22

## 2025-02-21 RX ORDER — SODIUM CHLORIDE 0.9 % (FLUSH) 0.9 %
5-40 SYRINGE (ML) INJECTION EVERY 12 HOURS SCHEDULED
Status: DISCONTINUED | OUTPATIENT
Start: 2025-02-21 | End: 2025-02-28 | Stop reason: HOSPADM

## 2025-02-21 RX ORDER — ONDANSETRON 2 MG/ML
4 INJECTION INTRAMUSCULAR; INTRAVENOUS EVERY 6 HOURS PRN
Status: DISCONTINUED | OUTPATIENT
Start: 2025-02-21 | End: 2025-02-28 | Stop reason: HOSPADM

## 2025-02-21 RX ORDER — FENTANYL CITRATE 50 UG/ML
50 INJECTION, SOLUTION INTRAMUSCULAR; INTRAVENOUS ONCE
Status: COMPLETED | OUTPATIENT
Start: 2025-02-21 | End: 2025-02-21

## 2025-02-21 RX ORDER — POLYETHYLENE GLYCOL 3350 17 G/17G
17 POWDER, FOR SOLUTION ORAL DAILY PRN
Status: DISCONTINUED | OUTPATIENT
Start: 2025-02-21 | End: 2025-02-28 | Stop reason: HOSPADM

## 2025-02-21 RX ORDER — ACETAMINOPHEN 650 MG/1
650 SUPPOSITORY RECTAL EVERY 6 HOURS PRN
Status: DISCONTINUED | OUTPATIENT
Start: 2025-02-21 | End: 2025-02-28 | Stop reason: HOSPADM

## 2025-02-21 RX ADMIN — FENTANYL CITRATE 50 MCG: 50 INJECTION INTRAMUSCULAR; INTRAVENOUS at 12:10

## 2025-02-21 RX ADMIN — SODIUM CHLORIDE, PRESERVATIVE FREE 10 ML: 5 INJECTION INTRAVENOUS at 22:18

## 2025-02-21 RX ADMIN — FENTANYL CITRATE 25 MCG: 50 INJECTION INTRAMUSCULAR; INTRAVENOUS at 22:02

## 2025-02-21 RX ADMIN — FENTANYL CITRATE 25 MCG: 50 INJECTION INTRAMUSCULAR; INTRAVENOUS at 18:02

## 2025-02-21 ASSESSMENT — PAIN SCALES - GENERAL
PAINLEVEL_OUTOF10: 7
PAINLEVEL_OUTOF10: 9
PAINLEVEL_OUTOF10: 10
PAINLEVEL_OUTOF10: 10

## 2025-02-21 ASSESSMENT — PAIN DESCRIPTION - DESCRIPTORS: DESCRIPTORS: ACHING;THROBBING;SHARP

## 2025-02-21 ASSESSMENT — PAIN - FUNCTIONAL ASSESSMENT
PAIN_FUNCTIONAL_ASSESSMENT: PREVENTS OR INTERFERES WITH ALL ACTIVE AND SOME PASSIVE ACTIVITIES
PAIN_FUNCTIONAL_ASSESSMENT: 0-10

## 2025-02-21 ASSESSMENT — PAIN DESCRIPTION - LOCATION: LOCATION: HIP

## 2025-02-21 ASSESSMENT — PAIN DESCRIPTION - ORIENTATION: ORIENTATION: LEFT

## 2025-02-21 NOTE — CONSULTS
Department of Orthopedic Surgery  Consult Note    Reason for Consult: Left hip pain    HISTORY OF PRESENT ILLNESS:       Patient is a 79 y.o. female who presents with left hip pain.  Patient presented to the ED after a mechanical fall at home.  She did strike her head, no LOC, she is on no anticoagulation.  She noted immediate left hip pain.  Left hip pain is worsened with any motion of the leg.  She is not able to ambulate after the fall.  No head pain.  She was seen by orthopedics in the ED with her son at bedside.  Denies numbness/tingling/paresthesias.  Denies any other orthopedic complaints at this time.      Past Medical History:        Diagnosis Date    Bradycardia 3/6/2017    Common bile duct dilation     GERD (gastroesophageal reflux disease)     Hypertension     Weight loss, unintentional      Past Surgical History:        Procedure Laterality Date    CHOLECYSTECTOMY      COLONOSCOPY      ENDOSCOPY, COLON, DIAGNOSTIC      ERCP  01/06/2015    ERCP  2/17/2015    with stent removal    UPPER GASTROINTESTINAL ENDOSCOPY  08/23/2017    Dr CASIMIRO Ward     Current Medications:   No current facility-administered medications for this encounter.  Allergies:  Altace [ramipril], Amlodipine, Covera-hs [verapamil], Hctz [hydrochlorothiazide], and Influenza vaccines    Social History:   TOBACCO:   reports that she has never smoked. She has never used smokeless tobacco.  ETOH:   reports that she does not currently use alcohol.  DRUGS:   reports no history of drug use.  ACTIVITIES OF DAILY LIVING:    OCCUPATION:    Family History:       Problem Relation Age of Onset    High Blood Pressure Mother     Heart Disease Sister     High Blood Pressure Sister     High Blood Pressure Maternal Aunt     Asthma Maternal Grandmother     No Known Problems Brother        REVIEW OF SYSTEMS:  CONSTITUTIONAL:  negative for  fevers, chills  EYES:  negative for blurred vision, visual disturbance  HEENT:  negative for  hearing loss, voice  without pain,+2/4 DP & PT pulses, cap refill <3sec, +5/5 PF/DF/EHL, distal sensation grossly intact to L4-S1 dermatomes, compartments soft and compressible.    Pelvis: -TTP, -Log roll, -Heel strike     DATA:    CBC:   Lab Results   Component Value Date/Time    WBC 7.5 02/21/2025 11:03 AM    RBC 4.20 02/21/2025 11:03 AM    HGB 13.4 02/21/2025 11:03 AM    HCT 41.1 02/21/2025 11:03 AM    MCV 97.9 02/21/2025 11:03 AM    MCH 31.9 02/21/2025 11:03 AM    MCHC 32.6 02/21/2025 11:03 AM    RDW 12.7 02/21/2025 11:03 AM     04/28/2022 11:59 AM    MPV 11.0 02/21/2025 11:03 AM     PT/INR:    Lab Results   Component Value Date/Time    PROTIME 11.2 02/21/2025 11:03 AM    INR 1.0 02/21/2025 11:03 AM     Radiology Review:  Left hip and left femur films demonstrate:  FINDINGS:  Multiple views of the left hip and left femur reveal acute mildly displaced  fracture left proximal femoral neck subcapital region with foreshortening and  minimal superolateral displacement of the distal femoral neck.  Femoral head  contour margins remain intact.  Left femur unremarkable distally without  distal femoral fracture.  Moderate degenerative changes are seen in the knee.     IMPRESSION:  Acute mildly displaced fracture of the left proximal femoral neck subcapital  region.  Foreshortening present.     IMPRESSION:  Closed left hip femoral neck fracture    PLAN:  Reviewed and discussed with attending.  Patient's symptoms and radiographic findings were discussed with the patient and her son.  At this point, recommend admission through medical service and plan for left hip hemiarthroplasty possibly tomorrow once medically cleared.  NWB LLE  Admit to internal medicine, appreciate fernando medical optimization and clearance  N.p.o. after midnight tonight  Ortho to follow while in hospital  Pain management/DVT prophylaxis per medicine service

## 2025-02-21 NOTE — ED PROVIDER NOTES
Department of Emergency Medicine   ED  Provider Note  Admit Date/RoomTime: 2/21/2025 10:27 AM  ED Room: On license of [facility]          History of Present Illness:  2/21/25, Time: 10:59 AM EST  Chief Complaint   Patient presents with    Fall     Patient had a fall c/o left hip pain , denies hitting head                 Julita Madrid is a 79 y.o. female presenting to the ED for fall.  Patient mechanical fall at home.  She did strike her head, no loss of conscious, she is on no anticoagulation.  Does complain of left hip pain.  She is not able to ambulate after the fall.  Received no medications in the field.  No head pain.  No nausea or vomiting.  No chest pain or shortness of breath.  No paresthesias.  No pain in the foot.  No other symptoms or complaints.    Review of Systems:   Pertinent positives and negatives are stated within HPI, all other systems reviewed and are negative.        --------------------------------------------- PAST HISTORY ---------------------------------------------  Past Medical History:  has a past medical history of Bradycardia, Common bile duct dilation, GERD (gastroesophageal reflux disease), Hypertension, and Weight loss, unintentional.    Past Surgical History:  has a past surgical history that includes Cholecystectomy; Colonoscopy; Endoscopy, colon, diagnostic; ERCP (01/06/2015); ERCP (2/17/2015); and Upper gastrointestinal endoscopy (08/23/2017).    Social History:  reports that she has never smoked. She has never used smokeless tobacco. She reports that she does not currently use alcohol. She reports that she does not use drugs.    Family History: family history includes Asthma in her maternal grandmother; Heart Disease in her sister; High Blood Pressure in her maternal aunt, mother, and sister; No Known Problems in her brother.. Unless otherwise noted, family history is non contributory    The patient’s home medications have been reviewed.    Allergies: Altace [ramipril], Amlodipine,

## 2025-02-22 ENCOUNTER — ANESTHESIA EVENT (OUTPATIENT)
Dept: OPERATING ROOM | Age: 80
End: 2025-02-22
Payer: MEDICARE

## 2025-02-22 ENCOUNTER — ANESTHESIA (OUTPATIENT)
Dept: OPERATING ROOM | Age: 80
End: 2025-02-22
Payer: MEDICARE

## 2025-02-22 PROBLEM — N17.9 AKI (ACUTE KIDNEY INJURY): Status: ACTIVE | Noted: 2025-02-22
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
## 2025-02-22 LAB
ALBUMIN SERPL-MCNC: 3.6 G/DL (ref 3.5–5.2)
ALP SERPL-CCNC: 79 U/L (ref 35–104)
ALT SERPL-CCNC: 21 U/L (ref 0–32)
ANION GAP SERPL CALCULATED.3IONS-SCNC: 12 MMOL/L (ref 7–16)
AST SERPL-CCNC: 33 U/L (ref 0–31)
BASOPHILS # BLD: 0.01 K/UL (ref 0–0.2)
BASOPHILS NFR BLD: 0 % (ref 0–2)
BILIRUB SERPL-MCNC: 1.2 MG/DL (ref 0–1.2)
BUN SERPL-MCNC: 23 MG/DL (ref 6–23)
CALCIUM SERPL-MCNC: 8.6 MG/DL (ref 8.6–10.2)
CHLORIDE SERPL-SCNC: 107 MMOL/L (ref 98–107)
CO2 SERPL-SCNC: 22 MMOL/L (ref 22–29)
CREAT SERPL-MCNC: 1.1 MG/DL (ref 0.5–1)
EOSINOPHIL # BLD: 0 K/UL (ref 0.05–0.5)
EOSINOPHILS RELATIVE PERCENT: 0 % (ref 0–6)
ERYTHROCYTE [DISTWIDTH] IN BLOOD BY AUTOMATED COUNT: 12.9 % (ref 11.5–15)
GFR, ESTIMATED: 50 ML/MIN/1.73M2
GLUCOSE SERPL-MCNC: 134 MG/DL (ref 74–99)
HCT VFR BLD AUTO: 39.4 % (ref 34–48)
HGB BLD-MCNC: 12.8 G/DL (ref 11.5–15.5)
IMM GRANULOCYTES # BLD AUTO: 0.05 K/UL (ref 0–0.58)
IMM GRANULOCYTES NFR BLD: 1 % (ref 0–5)
LYMPHOCYTES NFR BLD: 0.36 K/UL (ref 1.5–4)
LYMPHOCYTES RELATIVE PERCENT: 4 % (ref 20–42)
MAGNESIUM SERPL-MCNC: 2.2 MG/DL (ref 1.6–2.6)
MCH RBC QN AUTO: 31.8 PG (ref 26–35)
MCHC RBC AUTO-ENTMCNC: 32.5 G/DL (ref 32–34.5)
MCV RBC AUTO: 97.8 FL (ref 80–99.9)
MONOCYTES NFR BLD: 0.54 K/UL (ref 0.1–0.95)
MONOCYTES NFR BLD: 5 % (ref 2–12)
NEUTROPHILS NFR BLD: 91 % (ref 43–80)
NEUTS SEG NFR BLD: 9.47 K/UL (ref 1.8–7.3)
PLATELET CONFIRMATION: NORMAL
PLATELET, FLUORESCENCE: 94 K/UL (ref 130–450)
PMV BLD AUTO: 11.5 FL (ref 7–12)
POTASSIUM SERPL-SCNC: 4.3 MMOL/L (ref 3.5–5)
PROT SERPL-MCNC: 5.8 G/DL (ref 6.4–8.3)
RBC # BLD AUTO: 4.03 M/UL (ref 3.5–5.5)
RBC # BLD: ABNORMAL 10*6/UL
SODIUM SERPL-SCNC: 141 MMOL/L (ref 132–146)
TSH SERPL DL<=0.05 MIU/L-ACNC: 0.84 UIU/ML (ref 0.27–4.2)
WBC OTHER # BLD: 10.4 K/UL (ref 4.5–11.5)

## 2025-02-22 PROCEDURE — 99222 1ST HOSP IP/OBS MODERATE 55: CPT | Performed by: INTERNAL MEDICINE

## 2025-02-22 PROCEDURE — 36415 COLL VENOUS BLD VENIPUNCTURE: CPT

## 2025-02-22 PROCEDURE — 6360000002 HC RX W HCPCS: Performed by: INTERNAL MEDICINE

## 2025-02-22 PROCEDURE — 6360000002 HC RX W HCPCS: Performed by: NURSE PRACTITIONER

## 2025-02-22 PROCEDURE — 80053 COMPREHEN METABOLIC PANEL: CPT

## 2025-02-22 PROCEDURE — APPSS60 APP SPLIT SHARED TIME 46-60 MINUTES: Performed by: PHYSICIAN ASSISTANT

## 2025-02-22 PROCEDURE — 99223 1ST HOSP IP/OBS HIGH 75: CPT | Performed by: INTERNAL MEDICINE

## 2025-02-22 PROCEDURE — 6370000000 HC RX 637 (ALT 250 FOR IP): Performed by: NURSE PRACTITIONER

## 2025-02-22 PROCEDURE — 84443 ASSAY THYROID STIM HORMONE: CPT

## 2025-02-22 PROCEDURE — 2140000000 HC CCU INTERMEDIATE R&B

## 2025-02-22 PROCEDURE — 85025 COMPLETE CBC W/AUTO DIFF WBC: CPT

## 2025-02-22 PROCEDURE — 83735 ASSAY OF MAGNESIUM: CPT

## 2025-02-22 PROCEDURE — 2500000003 HC RX 250 WO HCPCS: Performed by: NURSE PRACTITIONER

## 2025-02-22 RX ORDER — HYDRALAZINE HYDROCHLORIDE 20 MG/ML
5 INJECTION INTRAMUSCULAR; INTRAVENOUS EVERY 4 HOURS PRN
Status: DISCONTINUED | OUTPATIENT
Start: 2025-02-22 | End: 2025-02-24

## 2025-02-22 RX ORDER — TRANEXAMIC ACID 10 MG/ML
1000 INJECTION, SOLUTION INTRAVENOUS
Status: COMPLETED | OUTPATIENT
Start: 2025-02-23 | End: 2025-02-23

## 2025-02-22 RX ADMIN — HYDRALAZINE HYDROCHLORIDE 5 MG: 20 INJECTION INTRAMUSCULAR; INTRAVENOUS at 12:26

## 2025-02-22 RX ADMIN — FENTANYL CITRATE 25 MCG: 50 INJECTION INTRAMUSCULAR; INTRAVENOUS at 17:55

## 2025-02-22 RX ADMIN — SODIUM CHLORIDE, PRESERVATIVE FREE 10 ML: 5 INJECTION INTRAVENOUS at 21:43

## 2025-02-22 RX ADMIN — FENTANYL CITRATE 25 MCG: 50 INJECTION INTRAMUSCULAR; INTRAVENOUS at 14:39

## 2025-02-22 RX ADMIN — FENTANYL CITRATE 25 MCG: 50 INJECTION INTRAMUSCULAR; INTRAVENOUS at 07:49

## 2025-02-22 RX ADMIN — SODIUM CHLORIDE, PRESERVATIVE FREE 10 ML: 5 INJECTION INTRAVENOUS at 07:50

## 2025-02-22 RX ADMIN — SODIUM CHLORIDE, PRESERVATIVE FREE 10 ML: 5 INJECTION INTRAVENOUS at 12:27

## 2025-02-22 RX ADMIN — VALSARTAN 160 MG: 160 TABLET, FILM COATED ORAL at 07:50

## 2025-02-22 RX ADMIN — FENTANYL CITRATE 25 MCG: 50 INJECTION INTRAMUSCULAR; INTRAVENOUS at 00:57

## 2025-02-22 RX ADMIN — FENTANYL CITRATE 25 MCG: 50 INJECTION INTRAMUSCULAR; INTRAVENOUS at 10:58

## 2025-02-22 RX ADMIN — FENTANYL CITRATE 25 MCG: 50 INJECTION INTRAMUSCULAR; INTRAVENOUS at 04:29

## 2025-02-22 RX ADMIN — FENTANYL CITRATE 25 MCG: 50 INJECTION INTRAMUSCULAR; INTRAVENOUS at 21:43

## 2025-02-22 ASSESSMENT — PAIN DESCRIPTION - LOCATION
LOCATION: HIP
LOCATION: LEG;HIP
LOCATION: HIP

## 2025-02-22 ASSESSMENT — PAIN DESCRIPTION - ORIENTATION
ORIENTATION: LEFT

## 2025-02-22 ASSESSMENT — PAIN DESCRIPTION - DESCRIPTORS
DESCRIPTORS: STABBING;SHARP;TENDER
DESCRIPTORS: ACHING;NAGGING;SHARP
DESCRIPTORS: ACHING;NAGGING;THROBBING
DESCRIPTORS: SHOOTING;THROBBING;TENDER
DESCRIPTORS: ACHING;THROBBING;SORE
DESCRIPTORS: ACHING;SORE

## 2025-02-22 ASSESSMENT — PAIN DESCRIPTION - PAIN TYPE
TYPE: ACUTE PAIN

## 2025-02-22 ASSESSMENT — PAIN SCALES - GENERAL
PAINLEVEL_OUTOF10: 8
PAINLEVEL_OUTOF10: 0
PAINLEVEL_OUTOF10: 10
PAINLEVEL_OUTOF10: 5
PAINLEVEL_OUTOF10: 9
PAINLEVEL_OUTOF10: 0
PAINLEVEL_OUTOF10: 8
PAINLEVEL_OUTOF10: 4
PAINLEVEL_OUTOF10: 0
PAINLEVEL_OUTOF10: 7
PAINLEVEL_OUTOF10: 10
PAINLEVEL_OUTOF10: 0
PAINLEVEL_OUTOF10: 0

## 2025-02-22 ASSESSMENT — PAIN - FUNCTIONAL ASSESSMENT
PAIN_FUNCTIONAL_ASSESSMENT: PREVENTS OR INTERFERES WITH MANY ACTIVE NOT PASSIVE ACTIVITIES
PAIN_FUNCTIONAL_ASSESSMENT: PREVENTS OR INTERFERES WITH MANY ACTIVE NOT PASSIVE ACTIVITIES
PAIN_FUNCTIONAL_ASSESSMENT: PREVENTS OR INTERFERES WITH ALL ACTIVE AND SOME PASSIVE ACTIVITIES
PAIN_FUNCTIONAL_ASSESSMENT: ACTIVITIES ARE NOT PREVENTED
PAIN_FUNCTIONAL_ASSESSMENT: PREVENTS OR INTERFERES WITH ALL ACTIVE AND SOME PASSIVE ACTIVITIES

## 2025-02-22 ASSESSMENT — PAIN DESCRIPTION - ONSET
ONSET: ON-GOING

## 2025-02-22 ASSESSMENT — PAIN DESCRIPTION - FREQUENCY
FREQUENCY: CONTINUOUS

## 2025-02-22 NOTE — PLAN OF CARE
Problem: Discharge Planning  Goal: Discharge to home or other facility with appropriate resources  Outcome: Progressing  Flowsheets (Taken 2/22/2025 0745)  Discharge to home or other facility with appropriate resources: Identify barriers to discharge with patient and caregiver     Problem: Pain  Goal: Verbalizes/displays adequate comfort level or baseline comfort level  Outcome: Progressing     Problem: Skin/Tissue Integrity  Goal: Skin integrity remains intact  Description: 1.  Monitor for areas of redness and/or skin breakdown  2.  Assess vascular access sites hourly  3.  Every 4-6 hours minimum:  Change oxygen saturation probe site  4.  Every 4-6 hours:  If on nasal continuous positive airway pressure, respiratory therapy assess nares and determine need for appliance change or resting period  Outcome: Progressing  Flowsheets  Taken 2/22/2025 1433  Skin Integrity Remains Intact: Monitor for areas of redness and/or skin breakdown  Taken 2/22/2025 0745  Skin Integrity Remains Intact: Monitor for areas of redness and/or skin breakdown

## 2025-02-22 NOTE — H&P
Hospital Medicine History & Physical      PCP: Anjel Duran DO    Date of Admission: 2/21/2025    Date of Service: . FEB. 22, 2025    Chief Complaint:   FALL      History Of Present Illness:     79 y.o. female presented with FALL,   DAUGHTER STATES HER BROTHER STATES SHE FELL IN THE [place] WHILE WALKING,  NO LOC, BUT SHE HAS BEEN DIZZY AND WEAK, AND NOT HAD A GOOD APPETITE SINCE HER GALL BLADDER WAS REMOVED.       Past Medical History:          Diagnosis Date    Bradycardia 3/6/2017    Common bile duct dilation     GERD (gastroesophageal reflux disease)     Hypertension     Weight loss, unintentional        Past Surgical History:          Procedure Laterality Date    CHOLECYSTECTOMY      COLONOSCOPY      ENDOSCOPY, COLON, DIAGNOSTIC      ERCP  01/06/2015    ERCP  2/17/2015    with stent removal    UPPER GASTROINTESTINAL ENDOSCOPY  08/23/2017    Dr CASIMIRO Ward       Medications Prior to Admission:      Prior to Admission medications    Medication Sig Start Date End Date Taking? Authorizing Provider   valsartan (DIOVAN) 160 MG tablet TAKE 1 TABLET BY MOUTH EVERY DAY 8/16/24   Anjel Duran DO       Allergies:  Altace [ramipril], Amlodipine, Covera-hs [verapamil], Hctz [hydrochlorothiazide], and Influenza vaccines    Social History:      The patient currently lives      TOBACCO:   reports that she has never smoked. She has never used smokeless tobacco.  ETOH:   reports that she does not currently use alcohol.      Family History:       Reviewed in detail and negative for DM, CAD, Cancer, CVA. Positive as follows:        Problem Relation Age of Onset    High Blood Pressure Mother     Heart Disease Sister     High Blood Pressure Sister     High Blood Pressure Maternal Aunt     Asthma Maternal Grandmother     No Known Problems Brother        REVIEW OF SYSTEMS:   Pertinent positives as noted

## 2025-02-22 NOTE — PROGRESS NOTES
Dr. Kim notified pts heart rate bradying down to the 35-45's bpm, pt is asleep and EP consult is sent however they haven't rounded on pt yet.

## 2025-02-22 NOTE — CONSULTS
Ausculation- Irregular rhythm with normal rate, 1-2/6 systolic murmur RUSB.  PV: No lower extremity edema. Pedal pulses palpable, no clubbing or cyanosis.   ABDOMEN: Soft, non-tender to light palpation. Bowel sounds present.   SKIN: Warm and dry.   NEURO / PSYCH: Oriented to person, place. Follows all commands. Pleasant affect.      DATA:    Telemetry: 2nd degree AVB HR 60s    Diagnostic:  All diagnostic testing and lab work thus far this admission reviewed in detail.    L Hip/Femur XR 2/21/2025  IMPRESSION:  Acute mildly displaced fracture of the left proximal femoral neck subcapital  region.  Foreshortening present.    Head CT 2/21/2025  IMPRESSION:  No acute intracranial abnormality.    CT C-spine 2/21/2025  IMPRESSION:  No acute abnormality of the cervical spine.    Labs:   CBC:   Recent Labs     02/21/25  1103 02/22/25  0529   WBC 7.5 10.4   HGB 13.4 12.8   HCT 41.1 39.4     BMP:   Recent Labs     02/21/25  1103 02/22/25  0529    141   K 4.2 4.3   CO2 26 22   BUN 17 23   CREATININE 1.0 1.1*   LABGLOM 59* 50*   CALCIUM 9.0 8.6     PT/INR:   Recent Labs     02/21/25  1103   PROTIME 11.2   INR 1.0     APTT:  Recent Labs     02/21/25  1103   APTT 26.6     FASTING LIPID PANEL:  Lab Results   Component Value Date/Time    CHOL 150 09/09/2014 04:10 AM    HDL 44 09/09/2014 04:10 AM    TRIG 104 09/09/2014 04:10 AM     LIVER PROFILE:  Recent Labs     02/22/25  0529   AST 33*   ALT 21               Assessment and plan to follow as per Dr. Chopra.      NOTE: This report was transcribed using voice recognition software. Every effort was made to ensure accuracy; however, inadvertent computerized transcription errors may be present.      ANNA Lemon PA-C  [facility]    Electronically signed by BE WASHINGTON PA-C on 2/22/2025 at 10:20 AM     I have personally spent more than 51% of the total time involved in performing this consultation.   I have personally and separately seen and evaluated the  risk and avoid changes in her heart rate, blood pressure, and volume status perioperatively.  Her aortic valve has not been evaluated since 3-24.  She is due for an echo.  I will order this.  This does not need to be done prior to surgery as it would not .    Electronically signed by Travis Chopra DO on 2/22/2025 at 1:18 PM    Note: This report was completed using computerized voice recognition software. Every effort has been made to ensure accuracy, however; and invert and computerized transcription errors may be present.

## 2025-02-22 NOTE — PLAN OF CARE
Problem: Discharge Planning  Goal: Discharge to home or other facility with appropriate resources  2/22/2025 1739 by Joel Rangel RN  Outcome: Progressing  2/22/2025 1734 by Joel Rangel RN  Outcome: Progressing  Flowsheets (Taken 2/22/2025 0745)  Discharge to home or other facility with appropriate resources: Identify barriers to discharge with patient and caregiver     Problem: Pain  Goal: Verbalizes/displays adequate comfort level or baseline comfort level  2/22/2025 1739 by Joel Rangel RN  Outcome: Progressing  2/22/2025 1734 by Joel Rangel RN  Outcome: Progressing     Problem: Skin/Tissue Integrity  Goal: Skin integrity remains intact  Description: 1.  Monitor for areas of redness and/or skin breakdown  2.  Assess vascular access sites hourly  3.  Every 4-6 hours minimum:  Change oxygen saturation probe site  4.  Every 4-6 hours:  If on nasal continuous positive airway pressure, respiratory therapy assess nares and determine need for appliance change or resting period  2/22/2025 1739 by Joel Rangel RN  Outcome: Progressing  2/22/2025 1734 by Joel Rangel RN  Outcome: Progressing  Flowsheets  Taken 2/22/2025 1433  Skin Integrity Remains Intact: Monitor for areas of redness and/or skin breakdown  Taken 2/22/2025 0745  Skin Integrity Remains Intact: Monitor for areas of redness and/or skin breakdown

## 2025-02-22 NOTE — PROGRESS NOTES
Reached out to Dr. Kim via [employer] to request she complete H&P per ortho request.    Per Dr. Kim, she will be in to complete H&P soon.

## 2025-02-22 NOTE — PROGRESS NOTES
Flora Woody notified via [employer] pt /58 manually after medicating for pain.     Defer to primary, notified Dr. Kim while on floor.

## 2025-02-22 NOTE — PROGRESS NOTES
4 Eyes Skin Assessment     NAME:  Julita Madrid  YOB: 1945  MEDICAL RECORD NUMBER:  [id number]    The patient is being assessed for  Admission    I agree that at least one RN has performed a thorough Head to Toe Skin Assessment on the patient. ALL assessment sites listed below have been assessed.      Areas assessed by both nurses:    Head, Face, Ears, Shoulders, Back, Chest, Arms, Elbows, Hands, Sacrum. Buttock, Coccyx, Ischium, and Legs. Feet and Heels        Does the Patient have a Wound? No noted wound(s)       Preston Prevention initiated by RN: No  Wound Care Orders initiated by RN: No    Pressure Injury (Stage 3,4, Unstageable, DTI, NWPT, and Complex wounds) if present, place Wound referral order by RN under : No    New Ostomies, if present place, Ostomy referral order under : No     Nurse 1 eSignature: Electronically signed by Gracia Kim RN on 2/22/25 at 6:04 AM EST    **SHARE this note so that the co-signing nurse can place an eSignature**    Nurse 2 eSignature: {Esignature:[id number]}

## 2025-02-22 NOTE — PLAN OF CARE
Orthopedic surgery update:    -Appreciate internal medicine H&P, abnormal EKG.  Cardiology consult will be performed prior to surgery  -Regular diet now  -N.p.o. after midnight  -Plan will be left hip hemiarthroplasty in the operating room tomorrow pending cardiac clearance              Jesús Schroeder DO   Orthopaedic Surgery   2/22/2025  10:18 AM    Note: This report was completed using [employer] voiced recognition software.  Every effort has been made to ensure accuracy; however, inadvertent computerized transcription errors may be present.

## 2025-02-23 ENCOUNTER — APPOINTMENT (OUTPATIENT)
Dept: GENERAL RADIOLOGY | Age: 80
DRG: 522 | End: 2025-02-23
Payer: MEDICARE

## 2025-02-23 PROBLEM — S72.002A CLOSED FRACTURE OF LEFT HIP (HCC): Status: ACTIVE | Noted: 2025-02-23

## 2025-02-23 PROCEDURE — 6360000002 HC RX W HCPCS: Performed by: STUDENT IN AN ORGANIZED HEALTH CARE EDUCATION/TRAINING PROGRAM

## 2025-02-23 PROCEDURE — 2500000003 HC RX 250 WO HCPCS: Performed by: NURSE PRACTITIONER

## 2025-02-23 PROCEDURE — 6360000002 HC RX W HCPCS: Performed by: INTERNAL MEDICINE

## 2025-02-23 PROCEDURE — 88311 DECALCIFY TISSUE: CPT

## 2025-02-23 PROCEDURE — 6360000002 HC RX W HCPCS

## 2025-02-23 PROCEDURE — 3600000005 HC SURGERY LEVEL 5 BASE: Performed by: STUDENT IN AN ORGANIZED HEALTH CARE EDUCATION/TRAINING PROGRAM

## 2025-02-23 PROCEDURE — 6360000002 HC RX W HCPCS: Performed by: NURSE PRACTITIONER

## 2025-02-23 PROCEDURE — 88305 TISSUE EXAM BY PATHOLOGIST: CPT

## 2025-02-23 PROCEDURE — 73502 X-RAY EXAM HIP UNI 2-3 VIEWS: CPT

## 2025-02-23 PROCEDURE — 2500000003 HC RX 250 WO HCPCS

## 2025-02-23 PROCEDURE — 2580000003 HC RX 258

## 2025-02-23 PROCEDURE — 99232 SBSQ HOSP IP/OBS MODERATE 35: CPT | Performed by: INTERNAL MEDICINE

## 2025-02-23 PROCEDURE — 0SRS0JZ REPLACEMENT OF LEFT HIP JOINT, FEMORAL SURFACE WITH SYNTHETIC SUBSTITUTE, OPEN APPROACH: ICD-10-PCS | Performed by: STUDENT IN AN ORGANIZED HEALTH CARE EDUCATION/TRAINING PROGRAM

## 2025-02-23 PROCEDURE — 27236 TREAT THIGH FRACTURE: CPT | Performed by: STUDENT IN AN ORGANIZED HEALTH CARE EDUCATION/TRAINING PROGRAM

## 2025-02-23 PROCEDURE — 2709999900 HC NON-CHARGEABLE SUPPLY: Performed by: STUDENT IN AN ORGANIZED HEALTH CARE EDUCATION/TRAINING PROGRAM

## 2025-02-23 PROCEDURE — 3600000015 HC SURGERY LEVEL 5 ADDTL 15MIN: Performed by: STUDENT IN AN ORGANIZED HEALTH CARE EDUCATION/TRAINING PROGRAM

## 2025-02-23 PROCEDURE — 3700000000 HC ANESTHESIA ATTENDED CARE: Performed by: STUDENT IN AN ORGANIZED HEALTH CARE EDUCATION/TRAINING PROGRAM

## 2025-02-23 PROCEDURE — 2580000003 HC RX 258: Performed by: STUDENT IN AN ORGANIZED HEALTH CARE EDUCATION/TRAINING PROGRAM

## 2025-02-23 PROCEDURE — 76942 ECHO GUIDE FOR BIOPSY: CPT | Performed by: STUDENT IN AN ORGANIZED HEALTH CARE EDUCATION/TRAINING PROGRAM

## 2025-02-23 PROCEDURE — 2500000003 HC RX 250 WO HCPCS: Performed by: STUDENT IN AN ORGANIZED HEALTH CARE EDUCATION/TRAINING PROGRAM

## 2025-02-23 PROCEDURE — 2140000000 HC CCU INTERMEDIATE R&B

## 2025-02-23 PROCEDURE — 7100000000 HC PACU RECOVERY - FIRST 15 MIN: Performed by: STUDENT IN AN ORGANIZED HEALTH CARE EDUCATION/TRAINING PROGRAM

## 2025-02-23 PROCEDURE — 3700000001 HC ADD 15 MINUTES (ANESTHESIA): Performed by: STUDENT IN AN ORGANIZED HEALTH CARE EDUCATION/TRAINING PROGRAM

## 2025-02-23 PROCEDURE — C1776 JOINT DEVICE (IMPLANTABLE): HCPCS | Performed by: STUDENT IN AN ORGANIZED HEALTH CARE EDUCATION/TRAINING PROGRAM

## 2025-02-23 PROCEDURE — 6370000000 HC RX 637 (ALT 250 FOR IP): Performed by: NURSE PRACTITIONER

## 2025-02-23 PROCEDURE — 7100000001 HC PACU RECOVERY - ADDTL 15 MIN: Performed by: STUDENT IN AN ORGANIZED HEALTH CARE EDUCATION/TRAINING PROGRAM

## 2025-02-23 DEVICE — IMPLANTABLE DEVICE: Type: IMPLANTABLE DEVICE | Site: HIP | Status: FUNCTIONAL

## 2025-02-23 DEVICE — HEAD FEM OD44MM ID26MM HIP CO CHROM POLYETH BPLR CEMENTLESS: Type: IMPLANTABLE DEVICE | Site: HIP | Status: FUNCTIONAL

## 2025-02-23 DEVICE — STEM FEM SZ 8 L117MM NK L37MM 51MM OFFSET 127DEG HIP TI: Type: IMPLANTABLE DEVICE | Site: HIP | Status: FUNCTIONAL

## 2025-02-23 RX ORDER — OXYCODONE AND ACETAMINOPHEN 5; 325 MG/1; MG/1
1 TABLET ORAL EVERY 6 HOURS PRN
Qty: 28 TABLET | Refills: 0 | Status: SHIPPED | OUTPATIENT
Start: 2025-02-23 | End: 2025-03-02

## 2025-02-23 RX ORDER — ONDANSETRON 2 MG/ML
INJECTION INTRAMUSCULAR; INTRAVENOUS
Status: DISCONTINUED | OUTPATIENT
Start: 2025-02-23 | End: 2025-02-23 | Stop reason: SDUPTHER

## 2025-02-23 RX ORDER — DEXAMETHASONE SODIUM PHOSPHATE 10 MG/ML
INJECTION, SOLUTION INTRA-ARTICULAR; INTRALESIONAL; INTRAMUSCULAR; INTRAVENOUS; SOFT TISSUE
Status: DISCONTINUED | OUTPATIENT
Start: 2025-02-23 | End: 2025-02-23 | Stop reason: SDUPTHER

## 2025-02-23 RX ORDER — ASPIRIN 81 MG/1
81 TABLET ORAL 2 TIMES DAILY
Qty: 60 TABLET | Refills: 0 | Status: SHIPPED | OUTPATIENT
Start: 2025-02-23

## 2025-02-23 RX ORDER — SODIUM CHLORIDE 9 MG/ML
INJECTION, SOLUTION INTRAVENOUS
Status: DISCONTINUED | OUTPATIENT
Start: 2025-02-23 | End: 2025-02-23 | Stop reason: SDUPTHER

## 2025-02-23 RX ORDER — OXYCODONE HYDROCHLORIDE 5 MG/1
5 TABLET ORAL EVERY 4 HOURS PRN
Status: DISCONTINUED | OUTPATIENT
Start: 2025-02-23 | End: 2025-02-28 | Stop reason: HOSPADM

## 2025-02-23 RX ORDER — ROPIVACAINE HYDROCHLORIDE 5 MG/ML
INJECTION, SOLUTION EPIDURAL; INFILTRATION; PERINEURAL
Status: DISPENSED
Start: 2025-02-23 | End: 2025-02-23

## 2025-02-23 RX ORDER — PROCHLORPERAZINE EDISYLATE 5 MG/ML
5 INJECTION INTRAMUSCULAR; INTRAVENOUS
Status: DISCONTINUED | OUTPATIENT
Start: 2025-02-23 | End: 2025-02-23 | Stop reason: HOSPADM

## 2025-02-23 RX ORDER — TOBRAMYCIN 1.2 G/30ML
INJECTION, POWDER, LYOPHILIZED, FOR SOLUTION INTRAVENOUS PRN
Status: DISCONTINUED | OUTPATIENT
Start: 2025-02-23 | End: 2025-02-23 | Stop reason: ALTCHOICE

## 2025-02-23 RX ORDER — ASPIRIN 81 MG/1
81 TABLET ORAL 2 TIMES DAILY
Qty: 60 TABLET | Refills: 0 | Status: SHIPPED | OUTPATIENT
Start: 2025-02-23 | End: 2025-02-23

## 2025-02-23 RX ORDER — HYDROMORPHONE HYDROCHLORIDE 1 MG/ML
0.5 INJECTION, SOLUTION INTRAMUSCULAR; INTRAVENOUS; SUBCUTANEOUS EVERY 5 MIN PRN
Status: DISCONTINUED | OUTPATIENT
Start: 2025-02-23 | End: 2025-02-23 | Stop reason: HOSPADM

## 2025-02-23 RX ORDER — FENTANYL CITRATE 50 UG/ML
INJECTION, SOLUTION INTRAMUSCULAR; INTRAVENOUS
Status: DISCONTINUED | OUTPATIENT
Start: 2025-02-23 | End: 2025-02-23 | Stop reason: SDUPTHER

## 2025-02-23 RX ORDER — SODIUM CHLORIDE 0.9 % (FLUSH) 0.9 %
5-40 SYRINGE (ML) INJECTION PRN
Status: DISCONTINUED | OUTPATIENT
Start: 2025-02-23 | End: 2025-02-23 | Stop reason: HOSPADM

## 2025-02-23 RX ORDER — ROCURONIUM BROMIDE 10 MG/ML
INJECTION, SOLUTION INTRAVENOUS
Status: DISCONTINUED | OUTPATIENT
Start: 2025-02-23 | End: 2025-02-23 | Stop reason: SDUPTHER

## 2025-02-23 RX ORDER — LIDOCAINE HYDROCHLORIDE 20 MG/ML
INJECTION, SOLUTION INTRAVENOUS
Status: DISCONTINUED | OUTPATIENT
Start: 2025-02-23 | End: 2025-02-23 | Stop reason: SDUPTHER

## 2025-02-23 RX ORDER — NALOXONE HYDROCHLORIDE 0.4 MG/ML
INJECTION, SOLUTION INTRAMUSCULAR; INTRAVENOUS; SUBCUTANEOUS PRN
Status: DISCONTINUED | OUTPATIENT
Start: 2025-02-23 | End: 2025-02-23 | Stop reason: HOSPADM

## 2025-02-23 RX ORDER — VANCOMYCIN HYDROCHLORIDE 1 G/20ML
INJECTION, POWDER, LYOPHILIZED, FOR SOLUTION INTRAVENOUS PRN
Status: DISCONTINUED | OUTPATIENT
Start: 2025-02-23 | End: 2025-02-23 | Stop reason: ALTCHOICE

## 2025-02-23 RX ORDER — CEFAZOLIN SODIUM 1 G/3ML
INJECTION, POWDER, FOR SOLUTION INTRAMUSCULAR; INTRAVENOUS
Status: DISCONTINUED | OUTPATIENT
Start: 2025-02-23 | End: 2025-02-23 | Stop reason: SDUPTHER

## 2025-02-23 RX ORDER — SODIUM CHLORIDE 9 MG/ML
INJECTION, SOLUTION INTRAVENOUS PRN
Status: DISCONTINUED | OUTPATIENT
Start: 2025-02-23 | End: 2025-02-23 | Stop reason: HOSPADM

## 2025-02-23 RX ORDER — HYDROMORPHONE HYDROCHLORIDE 1 MG/ML
0.25 INJECTION, SOLUTION INTRAMUSCULAR; INTRAVENOUS; SUBCUTANEOUS EVERY 5 MIN PRN
Status: DISCONTINUED | OUTPATIENT
Start: 2025-02-23 | End: 2025-02-23 | Stop reason: HOSPADM

## 2025-02-23 RX ORDER — OXYCODONE AND ACETAMINOPHEN 5; 325 MG/1; MG/1
1 TABLET ORAL EVERY 6 HOURS PRN
Qty: 28 TABLET | Refills: 0 | Status: SHIPPED | OUTPATIENT
Start: 2025-02-23 | End: 2025-02-23

## 2025-02-23 RX ORDER — SODIUM CHLORIDE 0.9 % (FLUSH) 0.9 %
5-40 SYRINGE (ML) INJECTION EVERY 12 HOURS SCHEDULED
Status: DISCONTINUED | OUTPATIENT
Start: 2025-02-23 | End: 2025-02-23 | Stop reason: HOSPADM

## 2025-02-23 RX ORDER — OXYCODONE HYDROCHLORIDE 10 MG/1
10 TABLET ORAL EVERY 4 HOURS PRN
Status: DISCONTINUED | OUTPATIENT
Start: 2025-02-23 | End: 2025-02-28 | Stop reason: HOSPADM

## 2025-02-23 RX ORDER — PROPOFOL 10 MG/ML
INJECTION, EMULSION INTRAVENOUS
Status: DISCONTINUED | OUTPATIENT
Start: 2025-02-23 | End: 2025-02-23 | Stop reason: SDUPTHER

## 2025-02-23 RX ORDER — FIBRINOGEN HUMAN AND THROMBIN HUMAN 1 ML
KIT TOPICAL PRN
Status: DISCONTINUED | OUTPATIENT
Start: 2025-02-23 | End: 2025-02-23 | Stop reason: ALTCHOICE

## 2025-02-23 RX ADMIN — SUGAMMADEX 200 MG: 100 INJECTION, SOLUTION INTRAVENOUS at 08:25

## 2025-02-23 RX ADMIN — SODIUM CHLORIDE: 9 INJECTION, SOLUTION INTRAVENOUS at 07:29

## 2025-02-23 RX ADMIN — DEXAMETHASONE SODIUM PHOSPHATE 4 MG: 10 INJECTION INTRAMUSCULAR; INTRAVENOUS at 07:35

## 2025-02-23 RX ADMIN — TRANEXAMIC ACID 1000 MG: 10 INJECTION, SOLUTION INTRAVENOUS at 07:54

## 2025-02-23 RX ADMIN — PROPOFOL 100 MG: 10 INJECTION, EMULSION INTRAVENOUS at 07:35

## 2025-02-23 RX ADMIN — ACETAMINOPHEN 650 MG: 325 TABLET ORAL at 00:02

## 2025-02-23 RX ADMIN — VALSARTAN 160 MG: 160 TABLET, FILM COATED ORAL at 16:30

## 2025-02-23 RX ADMIN — PHENYLEPHRINE HYDROCHLORIDE 100 MCG: 10 INJECTION INTRAVENOUS at 08:34

## 2025-02-23 RX ADMIN — FENTANYL CITRATE 25 MCG: 50 INJECTION INTRAMUSCULAR; INTRAVENOUS at 00:55

## 2025-02-23 RX ADMIN — HYDRALAZINE HYDROCHLORIDE 5 MG: 20 INJECTION INTRAMUSCULAR; INTRAVENOUS at 09:40

## 2025-02-23 RX ADMIN — PHENYLEPHRINE HYDROCHLORIDE 100 MCG: 10 INJECTION INTRAVENOUS at 07:51

## 2025-02-23 RX ADMIN — WATER 2000 MG: 1 INJECTION INTRAMUSCULAR; INTRAVENOUS; SUBCUTANEOUS at 22:49

## 2025-02-23 RX ADMIN — ENOXAPARIN SODIUM 40 MG: 100 INJECTION SUBCUTANEOUS at 16:30

## 2025-02-23 RX ADMIN — CEFAZOLIN 2 G: 1 INJECTION, POWDER, FOR SOLUTION INTRAMUSCULAR; INTRAVENOUS at 07:49

## 2025-02-23 RX ADMIN — LIDOCAINE HYDROCHLORIDE 60 MG: 20 INJECTION, SOLUTION INTRAVENOUS at 07:35

## 2025-02-23 RX ADMIN — HYDRALAZINE HYDROCHLORIDE 5 MG: 20 INJECTION INTRAMUSCULAR; INTRAVENOUS at 00:00

## 2025-02-23 RX ADMIN — TRANEXAMIC ACID 1000 MG: 10 INJECTION, SOLUTION INTRAVENOUS at 10:37

## 2025-02-23 RX ADMIN — ROCURONIUM BROMIDE 40 MG: 10 INJECTION, SOLUTION INTRAVENOUS at 07:35

## 2025-02-23 RX ADMIN — ONDANSETRON 4 MG: 2 INJECTION, SOLUTION INTRAMUSCULAR; INTRAVENOUS at 08:25

## 2025-02-23 RX ADMIN — FENTANYL CITRATE 50 MCG: 50 INJECTION, SOLUTION INTRAMUSCULAR; INTRAVENOUS at 07:35

## 2025-02-23 RX ADMIN — ROPIVACAINE HYDROCHLORIDE 40 ML: 5 INJECTION, SOLUTION EPIDURAL; INFILTRATION; PERINEURAL at 07:20

## 2025-02-23 RX ADMIN — SODIUM CHLORIDE, PRESERVATIVE FREE 20 ML: 5 INJECTION INTRAVENOUS at 16:32

## 2025-02-23 RX ADMIN — WATER 2000 MG: 1 INJECTION INTRAMUSCULAR; INTRAVENOUS; SUBCUTANEOUS at 16:29

## 2025-02-23 ASSESSMENT — PAIN DESCRIPTION - ORIENTATION
ORIENTATION: LEFT
ORIENTATION: RIGHT

## 2025-02-23 ASSESSMENT — PAIN - FUNCTIONAL ASSESSMENT
PAIN_FUNCTIONAL_ASSESSMENT: ACTIVITIES ARE NOT PREVENTED
PAIN_FUNCTIONAL_ASSESSMENT: ACTIVITIES ARE NOT PREVENTED

## 2025-02-23 ASSESSMENT — PAIN SCALES - GENERAL
PAINLEVEL_OUTOF10: 6
PAINLEVEL_OUTOF10: 10

## 2025-02-23 ASSESSMENT — PAIN DESCRIPTION - DESCRIPTORS
DESCRIPTORS: ACHING
DESCRIPTORS: ACHING

## 2025-02-23 ASSESSMENT — PAIN DESCRIPTION - LOCATION
LOCATION: HIP
LOCATION: BACK

## 2025-02-23 NOTE — DISCHARGE INSTRUCTIONS
[facility] Department of Orthopedic Surgery  [address]   [phone]   [place] [address]    Dr. Jesús Schroeder, DO    Orthopaedics Discharge Instructions   Weight bearing Status - Weight bearing as tolerated - on left lower Extremity  Pain medication Per Prescriptions  Contact Office for Medication Refill- [phone]  Office can refill pain med every 7 days  If patient discharging to facility then pain control will be continued per facility physician  Ice to operative/injured site for 15-30 minutes of each hour for next 5 days    Recommend that you continue to ice the area 2-3 times per day after this   Elevate operative/injured limb on 2 pillows at home  Goal is to have limb above the heart if able  Continue DVT Prophylaxis (blood clot prevention) as Prescribed: ***   Wound care - ***  Fracture Care -  ***  Follow Up in Office in 2 weeks. Your first post op appointment is often the PAs.     Call the office at [phone] directions or with any questions.  Watch for these significant complications.  Call physician if they or any other problems occur:  Fever over 101°, redness, swelling or warmth at the operative site  Unrelieved nausea    Foul smelling or cloudy drainage at the operative site   Unrelieved pain    Blood soaked dressing. (Some oozing may be normal)     Numb, pale, blue, cold or tingling extremity    Future Appointments   Date Time Provider Department Center   5/14/2025  3:45 PM Anjel [facility], DO [facility]   11/17/2025  3:15 PM Anjel [facility], DO [facility]         It is the Department of Orthopaedic Trauma's standard of practice that providers will de-escalate(wean) all patients from narcotic(opioid) medications during the post-operative period.   We provide multimodal pain control but opioid medications are tapered in all of our patients.  If patient requires referral to pain management for prolonged taper off of opioid pain medication we will

## 2025-02-23 NOTE — CONSULTS
of accessory muscles  Abdomen: soft, non-tender, nondistended  Musculoskeletal: no digital clubbing, no edema   Skin: warm, no rashes     I have personally reviewed the laboratory, cardiac diagnostic and radiographic testing as outlined below:    Data:    Recent Labs     25  1103 25  0529   WBC 7.5 10.4   HGB 13.4 12.8   HCT 41.1 39.4     Recent Labs     25  1103 25  0529    141   K 4.2 4.3    107   CO2 26 22   BUN 17 23   CREATININE 1.0 1.1*   CALCIUM 9.0 8.6      Lab Results   Component Value Date/Time    MG 2.2 2025 05:29 AM     Recent Labs     25  0529   TSH 0.84     Recent Labs     25  1103   INR 1.0         Telemetry: Sinus rhythm with second-degree AV block type I    EK25: Sinus rhythm with second-degree AV block type I, Wenckebach     Echocardiogram:   3/25/24  Interpretation Summary  Show Result ComparisonTechnically adequate study  Mildly dilated left ventricle with low normal ejection fraction  Bicuspid aortic valve with mild aortic stenosis  Mild pulmonary hypertension  Ejection fraction 50-55%     Echo Findings    Left Ventricle Low normal left ventricular systolic function with a visually estimated EF of 50 - 55%. Left ventricle is mildly dilated. Normal wall thickness. Normal wall motion. Indeterminate diastolic function.   Left Atrium Left atrium size is normal.   Right Ventricle Right ventricle size is normal. Normal systolic function.   Right Atrium Right atrium size is normal.   Aortic Valve Bicuspid valve. No regurgitation. Mild stenosis of the aortic valve. AV mean gradient is 9 mmHg. AV area by continuity VTI is 1.6 cm2.   Mitral Valve Valve structure is normal. Physiologically normal regurgitation. No stenosis noted.   Tricuspid Valve Valve structure is normal. Trace regurgitation. No stenosis noted.   Pulmonic Valve Valve structure is normal. Trace regurgitation. No stenosis noted.   Aorta Normal sized aortic root.   IVC/Hepatic  Veins IVC size is normal.   Pericardium No pericardial effusion.       Outpatient Monitor:   1/0-1/24/24      I have independently reviewed all of the ECGs and rhythm strips per above     Assessment/Plan: This is a 79 y.o. female with a history of     1.  AV block-secondary type I-Wenckebach with no history of syncope or near syncope in the past  Does not preclude the need for urgent hip surgery  Does not require any acute or urgent intervention    2.  Left proximal femoral neck subcapital fracture-mildly displaced  Need for urgent surgery-scheduled for tomorrow a.m.    3.  Hypertension--well-controlled on Diovan    4.  Bicuspid aortic valve--last echo does not show significant aortic stenosis    Recommendations:    Continued outpatient monitoring--consider repeat ZIO or loop recorder for higher degrees of AV block if any  No contraindication from electrophysiology standpoint for hip surgery    All of the above was discussed with the patient's family reviewing the above stated recommendations.  And a total of >50% of that time involved face-to-face time providing counseling and or coordination of care with the other providers, reviewing records/tests, counseling/education of the patient, ordering medications/tests/procedures, coordinating care, and documenting clinical information in the EHR.     Thank you for allowing me to participate in your patient's care.  Please call me if there are any questions or concerns.      Nighat Beauchamp MD  Cardiac Electrophysiology  [facility]  [facility] [place]: [facility]  8:18 PM  2/22/2025

## 2025-02-23 NOTE — BRIEF OP NOTE
Brief Postoperative Note      Patient: Julita Madrid  YOB: 1945  MRN: [id number]    Date of Procedure: 2/23/2025    Pre-Op Diagnosis Codes:      * Closed fracture of neck of left femur, initial encounter ([facility]) [S72.002A]    Post-Op Diagnosis: Same       Procedure(s):  LEFT HIP HEMIARTHROPLASTY    Surgeon(s):  Jesús Schroeder DO    Assistant:  Resident: Rod Carranza DO; Bruce Peñaloza DO    Anesthesia: General    Estimated Blood Loss (mL): less than 50     Complications: None    Specimens:   * No specimens in log *    Implants:  Implant Name Type Inv. Item Serial No.  Lot No. LRB No. Used Action   STEM FEM SZ 8 L117MM NK L37MM 51MM OFFSET 127DEG HIP TI - [id number]  STEM FEM SZ 8 L117MM NK L37MM 51MM OFFSET 127DEG HIP TI  [employer] [facility]  Left 1 Implanted   HEAD FEM OD44MM ID26MM HIP CO CHROM POLYETH BPLR CEMENTLESS - [id number]  HEAD FEM OD44MM ID26MM HIP CO CHROM POLYETH BPLR CEMENTLESS  [employer] [facility]  Left 1 Implanted   HEAD FEM [id number] -3MM V40 COCR LFIT - [id number]  HEAD FEM [id number] -3MM V40 COCR LFIT  [employer] [facility]  Left 1 Implanted         Drains: * No LDAs found *    Findings:  Infection Present At Time Of Surgery (PATOS) (choose all levels that have infection present):  No infection present  Other Findings: Left hip hemiarthroplasty    Electronically signed by Jesús Schroeder DO on 2/23/2025 at 8:32 AM

## 2025-02-23 NOTE — PROGRESS NOTES
Orthopedic surgery plan of care      This is an 79-year-old female with a left femoral neck fracture.  This is an operative fracture for early mobilization and weightbearing.  Standard of care is left hip hemiarthroplasty.  She has received medical and cardiac clearance.  We discussed imaging and postop recovery in detail.  Discussed potential loss of ambulatory function, medical complications, and 1 year mortality rate associated with hip fractures.  Risk-benefit and alternatives of hip hemiarthroplasty were discussed in detail.  Patient understands the risk of blood loss, blood clot, infection, damage surrounding neurovascular structures and tendons, instability, arthrofibrosis, limb length inequality, periprosthetic fracture, catastrophic implant failure, complication of anesthesia, need for reoperation amongst others.  Patient wishes to proceed with surgery.  Informed consent was obtained and signed and placed in the chart.  My initials were placed on her left hip

## 2025-02-23 NOTE — CARE COORDINATION
Attempted to evaluate patient, she is off the floor for scheduled surgery.  Will attempt to evaluate later as able.  Electronically signed by DELPHINE Arana CNP on 2/23/25 at 7:52 AM EST

## 2025-02-23 NOTE — PROGRESS NOTES
[facility] PHYSICIANS- [facility] [place] [place] Electrophysiology  Consultation Report  PATIENT: Julita Madrid  MEDICAL RECORD NUMBER: [id number]  DATE OF SERVICE:  2/23/2025  ATTENDING ELECTROPHYSIOLOGIST: Nighat Beauchamp MD  PRIMARY ELECTROPHYSIOLOGIST: Nighat Beauchamp MD  REFERRING PHYSICIAN: No ref. provider found and Anjel Duran DO  CHIEF COMPLAINT: Fall and left leg pain    HPI: This is a 79 y.o. female with a history of hypertension, second-degree AV block type I noted on event monitoring in 2024, esophageal stricture treated with balloon dilatation but no previous cardiac events who presents to the hospital after a fall at home leading to left hip fracture.  She is followed by Dr. Cardenas from cardiology and was last seen by him in July 2024.  She had complained of progressive weakness and inability to ambulate.  She was taking her Lasix at that time but continued on Diovan.  She now presents after a fall at home.  Her son is at her bedside and most of the history was obtained from him.  There is no history of syncope or near syncope.  Initial workup revealed the presence of a closed fracture of the left hip and the patient is scheduled for surgery tomorrow.  She has been seen by general cardiology.  She denies any chest discomfort or cardiac symptoms at the present time  Cardiac electrophysiology service is consulted for second-degree AV block type I on monitoring prior to surgery.    2/23/25: Patient is now post hip surgery and denies any new cardiac symptoms.  Feels better overall.  No chest pain or shortness of breath.    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 02/21/2025    Thrombocytopenia, unspecified 05/01/2024  injection 5-40 mL  5-40 mL IntraVENous 2 times per day Cene, Annie, APRN - CNP   10 mL at 02/22/25 2143    sodium chloride flush 0.9 % injection 10 mL  10 mL IntraVENous PRN Cene, Annie, APRN - CNP   10 mL at 02/22/25 1227    0.9 % sodium chloride infusion   IntraVENous PRN Cene, Annie, APRN - CNP        enoxaparin (LOVENOX) injection 40 mg  40 mg SubCUTAneous Daily Cene, Annie, APRN - CNP        ondansetron (ZOFRAN-ODT) disintegrating tablet 4 mg  4 mg Oral Q8H PRN Cene, Annie, APRN - CNP        Or    ondansetron (ZOFRAN) injection 4 mg  4 mg IntraVENous Q6H PRN Cene, Annie, APRN - CNP        polyethylene glycol (GLYCOLAX) packet 17 g  17 g Oral Daily PRN Cene, Annie, APRN - CNP        acetaminophen (TYLENOL) tablet 650 mg  650 mg Oral Q6H PRN Cene, Annie, APRN - CNP   650 mg at 02/23/25 0002    Or    acetaminophen (TYLENOL) suppository 650 mg  650 mg Rectal Q6H PRN Cene, Annie, APRN - CNP        fentaNYL (SUBLIMAZE) injection 25 mcg  25 mcg IntraVENous Q3H PRN Cene, Annie, APRN - CNP   25 mcg at 02/23/25 0055        Allergies   Allergen Reactions    Altace [Ramipril]     Amlodipine      edema    Covera-Hs [Verapamil]     Hctz [Hydrochlorothiazide]     Influenza Vaccines      miscarriage       ROS:   Constitutional: Negative for fever, activity change and appetite change.   HENT: Negative for epistaxis.   Eyes: Negative for diploplia, blurred vision.   Respiratory: Negative for cough, chest tightness, shortness of breath and wheezing.   Cardiovascular: pertinent positives in HPI  Gastrointestinal: Negative for abdominal pain and blood in stool.   All other review of systems are negative     PHYSICAL EXAM:   Vitals:    02/23/25 1015 02/23/25 1030 02/23/25 1100 02/23/25 1251   BP: 127/65 (!) 123/50 (!) 145/65 139/76   Pulse: 62 66 60 64   Resp: 13 15 12 14   Temp:  97.2 °F (36.2 °C) 97.2 °F (36.2 °C)    TempSrc:   Oral    SpO2: 98% 97% 99% 99%   Weight:       Height:          Constitutional:

## 2025-02-23 NOTE — OP NOTE
for staple removal and repeat  x-rays of the left hip and AP pelvis.            Jesús Schroeder DO   Orthopaedic Surgery   2/23/2025  8:35 AM    Note: This report was completed using computerApozy voiced recognition software.  Every effort has been made to ensure accuracy; however, inadvertent computerized transcription errors may be present.

## 2025-02-24 LAB
ALBUMIN SERPL-MCNC: 3.3 G/DL (ref 3.5–5.2)
ALP SERPL-CCNC: 72 U/L (ref 35–104)
ALT SERPL-CCNC: 7 U/L (ref 0–32)
ANION GAP SERPL CALCULATED.3IONS-SCNC: 11 MMOL/L (ref 7–16)
AST SERPL-CCNC: 49 U/L (ref 0–31)
BASOPHILS # BLD: 0.01 K/UL (ref 0–0.2)
BASOPHILS NFR BLD: 0 % (ref 0–2)
BILIRUB SERPL-MCNC: 0.8 MG/DL (ref 0–1.2)
BUN SERPL-MCNC: 56 MG/DL (ref 6–23)
CALCIUM SERPL-MCNC: 8.1 MG/DL (ref 8.6–10.2)
CHLORIDE SERPL-SCNC: 110 MMOL/L (ref 98–107)
CO2 SERPL-SCNC: 22 MMOL/L (ref 22–29)
CREAT SERPL-MCNC: 1.5 MG/DL (ref 0.5–1)
EOSINOPHIL # BLD: 0 K/UL (ref 0.05–0.5)
EOSINOPHILS RELATIVE PERCENT: 0 % (ref 0–6)
ERYTHROCYTE [DISTWIDTH] IN BLOOD BY AUTOMATED COUNT: 13.1 % (ref 11.5–15)
GFR, ESTIMATED: 35 ML/MIN/1.73M2
GLUCOSE SERPL-MCNC: 118 MG/DL (ref 74–99)
HBA1C MFR BLD: 5.3 % (ref 4–5.6)
HCT VFR BLD AUTO: 37.4 % (ref 34–48)
HGB BLD-MCNC: 12 G/DL (ref 11.5–15.5)
IMM GRANULOCYTES # BLD AUTO: 0.05 K/UL (ref 0–0.58)
IMM GRANULOCYTES NFR BLD: 1 % (ref 0–5)
LYMPHOCYTES NFR BLD: 0.46 K/UL (ref 1.5–4)
LYMPHOCYTES RELATIVE PERCENT: 5 % (ref 20–42)
MCH RBC QN AUTO: 31.5 PG (ref 26–35)
MCHC RBC AUTO-ENTMCNC: 32.1 G/DL (ref 32–34.5)
MCV RBC AUTO: 98.2 FL (ref 80–99.9)
MONOCYTES NFR BLD: 0.77 K/UL (ref 0.1–0.95)
MONOCYTES NFR BLD: 8 % (ref 2–12)
NEUTROPHILS NFR BLD: 87 % (ref 43–80)
NEUTS SEG NFR BLD: 8.8 K/UL (ref 1.8–7.3)
PLATELET # BLD AUTO: 81 K/UL (ref 130–450)
PLATELET CONFIRMATION: NORMAL
PMV BLD AUTO: 12 FL (ref 7–12)
POTASSIUM SERPL-SCNC: 4.2 MMOL/L (ref 3.5–5)
PROT SERPL-MCNC: 5.5 G/DL (ref 6.4–8.3)
RBC # BLD AUTO: 3.81 M/UL (ref 3.5–5.5)
RBC # BLD: ABNORMAL 10*6/UL
SODIUM SERPL-SCNC: 143 MMOL/L (ref 132–146)
WBC OTHER # BLD: 10.1 K/UL (ref 4.5–11.5)

## 2025-02-24 PROCEDURE — 36415 COLL VENOUS BLD VENIPUNCTURE: CPT

## 2025-02-24 PROCEDURE — 83036 HEMOGLOBIN GLYCOSYLATED A1C: CPT

## 2025-02-24 PROCEDURE — 80053 COMPREHEN METABOLIC PANEL: CPT

## 2025-02-24 PROCEDURE — 92610 EVALUATE SWALLOWING FUNCTION: CPT | Performed by: SPEECH-LANGUAGE PATHOLOGIST

## 2025-02-24 PROCEDURE — 2580000003 HC RX 258: Performed by: NURSE PRACTITIONER

## 2025-02-24 PROCEDURE — 97161 PT EVAL LOW COMPLEX 20 MIN: CPT

## 2025-02-24 PROCEDURE — 97535 SELF CARE MNGMENT TRAINING: CPT

## 2025-02-24 PROCEDURE — 97530 THERAPEUTIC ACTIVITIES: CPT

## 2025-02-24 PROCEDURE — 2140000000 HC CCU INTERMEDIATE R&B

## 2025-02-24 PROCEDURE — 2500000003 HC RX 250 WO HCPCS: Performed by: NURSE PRACTITIONER

## 2025-02-24 PROCEDURE — 97165 OT EVAL LOW COMPLEX 30 MIN: CPT

## 2025-02-24 PROCEDURE — 6370000000 HC RX 637 (ALT 250 FOR IP): Performed by: NURSE PRACTITIONER

## 2025-02-24 PROCEDURE — 85025 COMPLETE CBC W/AUTO DIFF WBC: CPT

## 2025-02-24 PROCEDURE — 6360000002 HC RX W HCPCS: Performed by: NURSE PRACTITIONER

## 2025-02-24 RX ORDER — HYDRALAZINE HYDROCHLORIDE 20 MG/ML
10 INJECTION INTRAMUSCULAR; INTRAVENOUS EVERY 6 HOURS PRN
Status: DISCONTINUED | OUTPATIENT
Start: 2025-02-24 | End: 2025-02-28 | Stop reason: HOSPADM

## 2025-02-24 RX ORDER — SODIUM CHLORIDE, SODIUM LACTATE, POTASSIUM CHLORIDE, CALCIUM CHLORIDE 600; 310; 30; 20 MG/100ML; MG/100ML; MG/100ML; MG/100ML
INJECTION, SOLUTION INTRAVENOUS CONTINUOUS
Status: DISCONTINUED | OUTPATIENT
Start: 2025-02-24 | End: 2025-02-28 | Stop reason: HOSPADM

## 2025-02-24 RX ORDER — SODIUM CHLORIDE 9 MG/ML
INJECTION, SOLUTION INTRAVENOUS CONTINUOUS
Status: ACTIVE | OUTPATIENT
Start: 2025-02-24 | End: 2025-02-24

## 2025-02-24 RX ADMIN — SODIUM CHLORIDE, PRESERVATIVE FREE 10 ML: 5 INJECTION INTRAVENOUS at 21:44

## 2025-02-24 RX ADMIN — SODIUM CHLORIDE, SODIUM LACTATE, POTASSIUM CHLORIDE, AND CALCIUM CHLORIDE: .6; .31; .03; .02 INJECTION, SOLUTION INTRAVENOUS at 21:56

## 2025-02-24 RX ADMIN — SODIUM CHLORIDE: 0.9 INJECTION, SOLUTION INTRAVENOUS at 14:13

## 2025-02-24 RX ADMIN — SODIUM CHLORIDE, PRESERVATIVE FREE 10 ML: 5 INJECTION INTRAVENOUS at 09:04

## 2025-02-24 RX ADMIN — VALSARTAN 160 MG: 160 TABLET, FILM COATED ORAL at 09:04

## 2025-02-24 RX ADMIN — ENOXAPARIN SODIUM 40 MG: 100 INJECTION SUBCUTANEOUS at 09:03

## 2025-02-24 ASSESSMENT — PAIN SCALES - GENERAL: PAINLEVEL_OUTOF10: 0

## 2025-02-24 NOTE — PROGRESS NOTES
Julita Madrid is an 79-year-old female known to [facility] cardiology and follows with Dr. Murillo.  Presented to the emergency department 2/21/2025 s/p mechanical fall and was found to have left femur fracture.  Cardiology consulted on 2/22/2025 for restratification prior to surgery.  Patient is status post left hip hemiarthroplasty on 2/23/2025.  No acute cardiology concerns at this time.  Cardiology to sign off.

## 2025-02-24 NOTE — PLAN OF CARE
Problem: Discharge Planning  Goal: Discharge to home or other facility with appropriate resources  Outcome: Progressing     Problem: Pain  Goal: Verbalizes/displays adequate comfort level or baseline comfort level  Outcome: Progressing     Problem: Skin/Tissue Integrity  Goal: Skin integrity remains intact  Description: 1.  Monitor for areas of redness and/or skin breakdown  2.  Assess vascular access sites hourly  3.  Every 4-6 hours minimum:  Change oxygen saturation probe site  4.  Every 4-6 hours:  If on nasal continuous positive airway pressure, respiratory therapy assess nares and determine need for appliance change or resting period  Outcome: Progressing     Problem: Safety - Adult  Goal: Free from fall injury  Outcome: Progressing     Problem: ABCDS Injury Assessment  Goal: Absence of physical injury  Outcome: Progressing

## 2025-02-24 NOTE — PROGRESS NOTES
OCCUPATIONAL THERAPY INITIAL EVALUATION    [facility]  [address], [place]        Date:2025                                                  Patient Name: Julita Madrid    MRN: [id number]    : 1945    Room: [address] [place], [place] [address]      Evaluating OT: Travis Alvarez OTR/L; [id number]       Referring Provider: Eduarda Barajas MD     Specific Provider Orders/Date: OT Eval and Treat 25 0915       Diagnosis: Unwitnessed fall at home - Closed L hip femoral neck fx.    Surgery: 25  LEFT HIP HEMIARTHROPLASTY.    Pertinent Medical History:  has a past medical history of Bradycardia, Common bile duct dilation, GERD (gastroesophageal reflux disease), Hypertension, and Weight loss, unintentional.     Recommended Adaptive Equipment: TBD pending progress.     Precautions:  Fall Risk, WBAT LLE, L anterolateral hip precautions, +alarms, cognition, external catheter, dysphagia diet - soft & bite sized     Assessment of current deficits    [x] Functional mobility  [x]ADLs  [x] Strength               [x]Cognition    [x] Functional transfers   [x] IADLs         [x] Safety Awareness   [x]Endurance    [x] Fine Coordination              [x] Balance      [] Vision/perception   []Sensation     []Gross Motor Coordination  [x] ROM  [] Delirium                   [] Motor Control     OT PLAN OF CARE   OT POC based on physician orders, patient diagnosis and results of clinical assessment    Frequency/Duration 1-3 days/wk for 2 weeks PRN   Specific OT Treatment Interventions to include:   * Instruction/training on adapted ADL techniques and AE recommendations to increase functional independence within precautions       * Training on energy conservation strategies, correct breathing pattern and techniques to improve independence/tolerance for self-care routine  * Functional transfer/mobility training/DME recommendations for increased independence, safety, and fall  [place]  Sensation: No c/o numbness or tingling  Tone: WFL  Edema: Unremarkable    Comment: Cleared by RN to see pt. Upon arrival patient supine in bed and agreeable to OT session w/ son in room. At end of session, patient in Aguilar's position with +bed alarm, call light and phone within reach, all lines and tubes intact.  Overall patient demonstrated decreased strength, AROM, activity tolerance, sitting/standing balance, independence and safety during completion of ADL/functional transfer/mobility tasks. Therapist facilitated ADL tasks, functional transfers, bed mobility to address safety awareness, implementation of fall prevention strategies, & functional engagement throughout daily activities. Pt required increased time throughout session 2/2 deconditioning & impaired cognitive/safety awareness. Pt's family extensively educated on safe d/c planning & OT POC 2/2 pt's impaired functional/occupational status. Pt would benefit from continued skilled OT to increase safety and independence with completion of ADL/IADL tasks for functional independence and quality of life.    Treatment: OT treatment provided this date includes:   ADL-  Instruction/training on safety and adapted techniques for completion of ADLs: Therapist facilitated & pt educated on WBAT LLE/anterolateral hip precautions & activity modifications/adaptations to promote implementation of fall prevention strategies, EC/WS strategies, & safety awareness throughout ADLs.   Mobility-  Instruction/training on safety and improved independence with bed mobility/functional transfers. Pt required increased assist/cues to maintain safe transfer progressions & proper body mechanics.  Sitting/Standing Balance/Tolerance: to increase balance, core/pelvic stability, and activity tolerance during ADLs and facilitate proper posture and positioning. Pt seated EOB ~10 mins & stood at sink ~25 seconds.   Activity tolerance- Instruction/training on energy conservation/work

## 2025-02-24 NOTE — CARE COORDINATION
2/24:  Transition of care:  Pt presented to the ER for a fall from home.  Pt is on room air at 96%, Iv Apresoline PRN, Iv Fentanyl PRN & Sq Lovenox.  Pending PT OT & Speech evals.  CM spoke with son Anson & pt at bedside to discuss CM role & dc planning.  Pt'sPCP is Anjel Combs & uses [facility].  Pt lives with her  & son Anson in a ranch house with 2 steps to enter.  PTA pt was independent.  Pt has no hx of HHC/SNF.  Per Anson he would like his mother to come home with HHC -he has no preferences.  CM provide him with a list for HHC/SNF.  Pending PT/OT evals.  Pt's son will transport home.  Sw/CM will continue to follow.  Electronically signed by Nilda Hardin RN on 2/24/2025 at 9:45 AM    Case Management Assessment  Initial Evaluation    Date/Time of Evaluation: 2/24/2025 9:46 AM  Assessment Completed by: Nilda Hardin RN    If patient is discharged prior to next notation, then this note serves as note for discharge by case management.    Patient Name: Julita Madrid                   YOB: 1945  Diagnosis: Closed nondisp intertrochanteric fracture of left femur with nonunion [S72.145K]  Closed fracture of left hip, initial encounter (formerly [facility]) [S72.002A]  Closed head injury, initial encounter [S09.90XA]  Fall, initial encounter [W19.XXXA]                   Date / Time: 2/21/2025 10:27 AM    Patient Admission Status: Inpatient   Readmission Risk (Low < 19, Mod (19-27), High > 27): Readmission Risk Score: 11.8    Current PCP: Anjel Duran, DO  PCP verified by CM? Yes    Chart Reviewed: Yes      History Provided by: Patient  Patient Orientation: Alert and Oriented, Person, Place, Situation    Patient Cognition: Alert    Hospitalization in the last 30 days (Readmission):  No    If yes, Readmission Assessment in  Navigator will be completed.    Advance Directives:      Code Status: Full Code   Patient's Primary Decision Maker is:        Discharge Planning:    Patient lives with:

## 2025-02-24 NOTE — ACP (ADVANCE CARE PLANNING)
Advance Care Planning   Healthcare Decision Maker:    Primary Decision Maker: Anson Madrid - Spouse - [phone]    Secondary Decision Maker: Anson Madrid - Child - [phone]    Click here to complete Healthcare Decision Makers including selection of the Healthcare Decision Maker Relationship (ie \"Primary\").  Today we documented Decision Maker(s) consistent with Legal Next of Kin hierarchy.       Electronically signed by Nilda Hardin RN on 2/24/2025 at 9:49 AM

## 2025-02-24 NOTE — PROGRESS NOTES
[facility]                                Progress note    Subjective:  Denies chest pain, dyspnea, and abdominal pain  Denies nausea  Denies pain to left hip    Objective:  Sitting up in bed, conversing without difficulty  Son at the bedside, all questions answered  No acute distress  BP (!) 145/61   Pulse 70   Temp 97.8 °F (36.6 °C) (Temporal)   Resp 15   Ht 1.6 m (5' 3\")   Wt 53.5 kg (117 lb 15.1 oz)   SpO2 96%   BMI 20.89 kg/m²   CONST:  Well developed, well nourished elderly  female who appears stated age. Awake, alert, cooperative, no apparent distress  HEENT:   Head- Normocephalic, atraumatic   Eyes- Conjunctivae pink, anicteric  Throat- Oral mucosa pink and moist  Neck-  No stridor, trachea midline, no jugular venous distention. No adenopathy   CHEST: Chest symmetrical and non-tender to palpation. No accessory muscle use or intercostal retractions  RESPIRATORY: Lung sounds - clear throughout anterior and lateral fields   CARDIOVASCULAR:     No carotid bruit  Heart Inspection- shows no noted pulsations  Heart Palpation- no heaves or thrills; PMI is non-displaced   Heart Ausculation- Regular rate and rhythm, no murmur. No s3, s4 or rub   PV: No lower extremity edema. No varicosities. Pedal pulses palpable, no clubbing or cyanosis. Left hip DSD with scant amount shadow drainage noted  ABDOMEN: Soft, non-tender to light palpation. Bowel sounds present. No palpable masses no organomegaly; no abdominal bruit  MS: Good muscle strength and tone. No atrophy or abnormal movements.   : Deferred  SKIN: Warm and dry no statis dermatitis or ulcers   NEURO / PSYCH: Oriented to person, place and time. Speech clear and appropriate. Follows all commands. Pleasant affect      Recent Labs     02/21/25  1103 02/22/25  0529   WBC 7.5 10.4   HGB 13.4 12.8   HCT 41.1 39.4       Recent Labs     02/21/25  1103 02/22/25  0529    141   K 4.2 4.3    107   CO2 26 22   BUN 17 23  CNP,  8:43 AM  2/24/2025

## 2025-02-24 NOTE — PLAN OF CARE
Problem: Discharge Planning  Goal: Discharge to home or other facility with appropriate resources  2/24/2025 0946 by Magdaleno Werner RN  Outcome: Progressing  2/23/2025 2222 by Lindsey Lala RN  Outcome: Progressing     Problem: Pain  Goal: Verbalizes/displays adequate comfort level or baseline comfort level  2/24/2025 0946 by Magdaleno Werner RN  Outcome: Progressing  2/23/2025 2222 by Lindsey Lala RN  Outcome: Progressing     Problem: Skin/Tissue Integrity  Goal: Skin integrity remains intact  Description: 1.  Monitor for areas of redness and/or skin breakdown  2.  Assess vascular access sites hourly  3.  Every 4-6 hours minimum:  Change oxygen saturation probe site  4.  Every 4-6 hours:  If on nasal continuous positive airway pressure, respiratory therapy assess nares and determine need for appliance change or resting period  2/24/2025 0946 by Magdaleno Werner RN  Outcome: Progressing  2/23/2025 2222 by Lindsey Lala RN  Outcome: Progressing     Problem: Safety - Adult  Goal: Free from fall injury  2/24/2025 0946 by Magdaleno Werner RN  Outcome: Progressing  2/23/2025 2222 by Lindsey Lala RN  Outcome: Progressing     Problem: ABCDS Injury Assessment  Goal: Absence of physical injury  2/24/2025 0946 by Magdaleno Werner RN  Outcome: Progressing  2/23/2025 2222 by Lindsey Lala RN  Outcome: Progressing

## 2025-02-24 NOTE — PROGRESS NOTES
SPEECH/LANGUAGE PATHOLOGY  CLINICAL ASSESSMENT OF SWALLOWING FUNCTION   and PLAN OF CARE      PATIENT NAME:  Julita Madrid  (female)     MRN:  [id number]    :  1945  (79 y.o.)  STATUS:  Inpatient: Room 6420/6420-A    TODAY'S DATE:  2025  ORDER DATE, DESCRIPTION AND REFERRING PROVIDER: 25    SLP swallowing-dysphagia evaluation and treatment  Start:  25,   End:  25,   ONE TIME,   Standing Count:  1 Occurrences,   R       Eduarda Barajas MD  REASON FOR REFERRAL:     ESOPHAGEAL CONSTRICTURE DILATION IN PAST      EVALUATING THERAPIST: SHAJI Avalos                 RESULTS:    DYSPHAGIA DIAGNOSIS:   limited intake on exam, not able to determine type or severity of dysphagia       DIET RECOMMENDATIONS:  defer to physician, as Pt willing to accept minimal/ inadequate PO intake for SLP to determine appropriate diet.     Pt's daughter reports history of dilation in past, and that Pt is required to take small bites/ sips, avoids certain foods and presents with frequent belching during meals.     FEEDING RECOMMENDATIONS:     Assistance level:  Stand by/ Hand over Hand assistance is needed during all oral intake  Supervision is needed during all oral intake      Compensatory strategies recommended: Thorough oral care to prevent colonization of oral bacteria   Upright in bed/ chair as tolerated  Fully alert for all PO      Discussed recommendations with:  Patient , caregiver/family , and patient nurse in person    SPEECH THERAPY  PLAN OF CARE   The dysphagia POC is established based on physician order, dysphagia diagnosis and results of clinical assessment     Meal time assessment for 1-2 sessions to provide diet modification and compensatory strategy implementation due to limited intake on exam     Conditions Requiring Skilled Therapeutic Intervention for dysphagia:    Patient is performing below functional baseline d/t  current acute condition, respiratory compromise, multiple

## 2025-02-24 NOTE — PROGRESS NOTES
Department of Orthopedic Surgery  Resident Progress Note    Patient seen and examined. Pain controlled. No new complaints.  Denies chest pain, shortness of breath, dizziness/lightheadedness.,  Patient is appearing slightly confused this morning    VITALS:  BP (!) 145/61   Pulse 70   Temp 97.8 °F (36.6 °C) (Temporal)   Resp 15   Ht 1.6 m (5' 3\")   Wt 53.5 kg (117 lb 15.1 oz)   SpO2 96%   BMI 20.89 kg/m²     General: Awake and alert but keeps mumbling unknown words  MUSCULOSKELETAL:   left lower extremity:  Dressing C/D/I  Compartments soft and compressible  +PF/DF/EHL  +2/4 DP & PT pulses, Brisk Cap refill, Toes warm and perfused  Distal sensation grossly intact to Peroneals, Sural, Saphenous, and tibial nrs    CBC:   Lab Results   Component Value Date/Time    WBC 10.4 02/22/2025 05:29 AM    HGB 12.8 02/22/2025 05:29 AM    HCT 39.4 02/22/2025 05:29 AM     04/28/2022 11:59 AM     PT/INR:    Lab Results   Component Value Date/Time    PROTIME 11.2 02/21/2025 11:03 AM    INR 1.0 02/21/2025 11:03 AM       ASSESSMENT  S/P left hip hemiarthroplasty 2-23    PLAN    Weightbearing as tolerated  Multimodal pain management  DVT prophylaxis Lovenox  Patient has completed 24-hour antibiotic prophylaxis  PT/OT  Hemoglobin continue to monitor  I did reinforce her Aquacel with 2 Tegaderms  Orthopedic surgery will continue to follow

## 2025-02-24 NOTE — PROGRESS NOTES
Speech Language Pathology  NAME:  Julita Madrid  :  1945  DATE: 2025  ROOM:  [facility]    Pt unavailable at 1100 for Clinical Swallow Evaluation Discussed with caregiver/family , charge nurse via phone , and patient nurse in person     REASON:  Other: personal care needs at time of attempt x2    Will re-attempt as appropriate.       Thank You

## 2025-02-24 NOTE — PROGRESS NOTES
Voicemail left for Annie Davila NP notifying that family has concerns of poor PO fluid intake and requesting IVF.

## 2025-02-24 NOTE — CARE COORDINATION
2/24:  Update CM Note:  CM spoke with son & daughter at bedside to discuss PT/OT evals & they are still in agreement for the pt to dc home with HHC.  Pt's son choice [facility]  CM sent referral & they accepted.  HHC order placed.  Pt will need a Zio patch on dc.  Electronically signed by Nilda Hardin RN on 2/24/2025 at 12:48 PM

## 2025-02-24 NOTE — PROGRESS NOTES
Physical Therapy  Physical Therapy Initial Assessment     Name: Julita Madrid  : 1945  MRN: [id number]      Date of Service: 2025    Evaluating PT:  Bettyjim Elder PT, DPT [id number]    Room #:  6420/6420-A  Diagnosis:  Closed nondisp intertrochanteric fracture of left femur with nonunion [S72.145K]  Closed fracture of left hip, initial encounter ([facility]) [S72.002A]  Closed head injury, initial encounter [S09.90XA]  Fall, initial encounter [W19.XXXA]  PMHx/PSHx:    Past Medical History:   Diagnosis Date    Bradycardia 3/6/2017    Common bile duct dilation     GERD (gastroesophageal reflux disease)     Hypertension     Weight loss, unintentional       Procedure/Surgery:  LEFT HIP HEMIARTHROPLASTY   Precautions:  Falls, WBAT LLE, anterolateral hip precautions, cognition  Equipment Needs:  TBD, pt has WW and WC. Potential need for hospital bed if home DC    SUBJECTIVE:    Pt lives with  in a 1 story home with 2 stairs to enter and 1 rail.  Bed is on 1st floor and bath is on 1st floor.  Pt ambulated with no AD PTA. Son present at time of evaluation states that he checks in on them regularly.    OBJECTIVE:   Initial Evaluation  Date: 25 Treatment Short Term/ Long Term   Goals   AM-PAC 6 Clicks      Was pt agreeable to Eval/treatment? yes     Does pt have pain? L hip pain with mobility, does not rate     Bed Mobility  Rolling: MaxA  Supine to sit: MaxA x2  Sit to supine: MaxA x2  Scooting: MaxA  Rolling: Pablo  Supine to sit: Pablo  Sit to supine: Pablo  Scooting: Pablo   Transfers Sit to stand: MaxA x2  Stand to sit: MaxA x2  Stand pivot: NT  Sit to stand: Pablo  Stand to sit: Pablo  Stand pivot: Pablo with WW   Ambulation    NT  25 feet with WW Pablo   Stair negotiation: ascended and descended  NT  2 steps with 1 rail ModA   ROM BUE:  Defer to OT note  BLE:  LLE limited, RLE grossly WFL     Strength BUE:  Defer to OT note  BLE:  LLE limited by pain, RLE grossly 3/5  WFL   Balance Sitting EOB:   medical information, gathering information on past medical history/social history and prior level of function, completion of standardized testing/informal observation of tasks, assessment of data and education on plan of care and goals.    CPT codes:  [x] Low Complexity PT evaluation 20725  [] Moderate Complexity PT evaluation 06985  [] High Complexity PT evaluation 31852  [] PT Re-evaluation 77694  [] Gait training 33944 - minutes  [] Manual therapy 66992 - minutes  [x] Therapeutic activities 09231 10 minutes  [] Therapeutic exercises 59102 - minutes  [] Neuromuscular reeducation 66047 - minutes     Betty Elder PT, DPT  [id number]

## 2025-02-25 LAB
ALBUMIN SERPL-MCNC: 2.9 G/DL (ref 3.5–5.2)
ALP SERPL-CCNC: 68 U/L (ref 35–104)
ALT SERPL-CCNC: <5 U/L (ref 0–32)
ANION GAP SERPL CALCULATED.3IONS-SCNC: 15 MMOL/L (ref 7–16)
AST SERPL-CCNC: 45 U/L (ref 0–31)
BASOPHILS # BLD: 0 K/UL (ref 0–0.2)
BASOPHILS NFR BLD: 0 % (ref 0–2)
BILIRUB SERPL-MCNC: 0.8 MG/DL (ref 0–1.2)
BUN SERPL-MCNC: 47 MG/DL (ref 6–23)
CALCIUM SERPL-MCNC: 8.2 MG/DL (ref 8.6–10.2)
CHLORIDE SERPL-SCNC: 111 MMOL/L (ref 98–107)
CO2 SERPL-SCNC: 20 MMOL/L (ref 22–29)
CREAT SERPL-MCNC: 1.3 MG/DL (ref 0.5–1)
EOSINOPHIL # BLD: 0 K/UL (ref 0.05–0.5)
EOSINOPHILS RELATIVE PERCENT: 0 % (ref 0–6)
ERYTHROCYTE [DISTWIDTH] IN BLOOD BY AUTOMATED COUNT: 13.2 % (ref 11.5–15)
GFR, ESTIMATED: 41 ML/MIN/1.73M2
GLUCOSE SERPL-MCNC: 92 MG/DL (ref 74–99)
HCT VFR BLD AUTO: 34.4 % (ref 34–48)
HGB BLD-MCNC: 11.1 G/DL (ref 11.5–15.5)
LYMPHOCYTES NFR BLD: 0.4 K/UL (ref 1.5–4)
LYMPHOCYTES RELATIVE PERCENT: 7 % (ref 20–42)
MCH RBC QN AUTO: 31.6 PG (ref 26–35)
MCHC RBC AUTO-ENTMCNC: 32.3 G/DL (ref 32–34.5)
MCV RBC AUTO: 98 FL (ref 80–99.9)
MONOCYTES NFR BLD: 0.3 K/UL (ref 0.1–0.95)
MONOCYTES NFR BLD: 5 % (ref 2–12)
NEUTROPHILS NFR BLD: 88 % (ref 43–80)
NEUTS SEG NFR BLD: 4.99 K/UL (ref 1.8–7.3)
PLATELET CONFIRMATION: NORMAL
PLATELET, FLUORESCENCE: 77 K/UL (ref 130–450)
PMV BLD AUTO: 12.3 FL (ref 7–12)
POTASSIUM SERPL-SCNC: 4.1 MMOL/L (ref 3.5–5)
PROT SERPL-MCNC: 5.2 G/DL (ref 6.4–8.3)
RBC # BLD AUTO: 3.51 M/UL (ref 3.5–5.5)
RBC # BLD: ABNORMAL 10*6/UL
RBC # BLD: ABNORMAL 10*6/UL
SODIUM SERPL-SCNC: 146 MMOL/L (ref 132–146)
WBC OTHER # BLD: 5.7 K/UL (ref 4.5–11.5)

## 2025-02-25 PROCEDURE — 85025 COMPLETE CBC W/AUTO DIFF WBC: CPT

## 2025-02-25 PROCEDURE — 6370000000 HC RX 637 (ALT 250 FOR IP): Performed by: NURSE PRACTITIONER

## 2025-02-25 PROCEDURE — 97530 THERAPEUTIC ACTIVITIES: CPT

## 2025-02-25 PROCEDURE — 97535 SELF CARE MNGMENT TRAINING: CPT

## 2025-02-25 PROCEDURE — 2140000000 HC CCU INTERMEDIATE R&B

## 2025-02-25 PROCEDURE — 80053 COMPREHEN METABOLIC PANEL: CPT

## 2025-02-25 PROCEDURE — 6360000002 HC RX W HCPCS: Performed by: NURSE PRACTITIONER

## 2025-02-25 PROCEDURE — 36415 COLL VENOUS BLD VENIPUNCTURE: CPT

## 2025-02-25 PROCEDURE — 2580000003 HC RX 258: Performed by: NURSE PRACTITIONER

## 2025-02-25 RX ORDER — ENOXAPARIN SODIUM 100 MG/ML
30 INJECTION SUBCUTANEOUS DAILY
Status: DISCONTINUED | OUTPATIENT
Start: 2025-02-26 | End: 2025-02-28 | Stop reason: HOSPADM

## 2025-02-25 RX ADMIN — SODIUM CHLORIDE, SODIUM LACTATE, POTASSIUM CHLORIDE, AND CALCIUM CHLORIDE: .6; .31; .03; .02 INJECTION, SOLUTION INTRAVENOUS at 14:15

## 2025-02-25 RX ADMIN — ENOXAPARIN SODIUM 40 MG: 100 INJECTION SUBCUTANEOUS at 09:38

## 2025-02-25 RX ADMIN — ACETAMINOPHEN 650 MG: 325 TABLET ORAL at 12:28

## 2025-02-25 ASSESSMENT — PAIN DESCRIPTION - LOCATION: LOCATION: LEG;KNEE;HIP

## 2025-02-25 ASSESSMENT — PAIN SCALES - GENERAL
PAINLEVEL_OUTOF10: 3
PAINLEVEL_OUTOF10: 2
PAINLEVEL_OUTOF10: 2
PAINLEVEL_OUTOF10: 1
PAINLEVEL_OUTOF10: 2

## 2025-02-25 ASSESSMENT — PAIN DESCRIPTION - ORIENTATION: ORIENTATION: LEFT

## 2025-02-25 ASSESSMENT — PAIN DESCRIPTION - DESCRIPTORS: DESCRIPTORS: ACHING;DISCOMFORT;GNAWING

## 2025-02-25 NOTE — PROGRESS NOTES
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 Potential need for hospital bed if home DC    SUBJECTIVE:    Pt lives with  in a 1 story home with 2 stairs to enter and 1 rail.  Bed is on 1st floor and bath is on 1st floor.  Pt ambulated with no AD PTA. Son present at time of evaluation states that he checks in on them regularly.    OBJECTIVE:   Initial Evaluation  Date: 25 Treatment  25 Short Term/ Long Term   Goals   AM-PAC 6 Clicks     Was pt agreeable to Eval/treatment? yes yes    Does pt have pain? L hip pain with mobility, does not rate L hip pain with mobility     Bed Mobility  Rolling: MaxA  Supine to sit: MaxA x2  Sit to supine: MaxA x2  Scooting: MaxA Rolling: MaxA  Supine to sit: MaxA x2  Sit to supine: MaxA x2  Scooting: MaxA Rolling: Pablo  Supine to sit: Pablo  Sit to supine: Pablo  Scooting: Pablo   Transfers Sit to stand: MaxA x2  Stand to sit: MaxA x2  Stand pivot: NT Sit to stand: ModA x2  Stand to sit: ModA x2  Stand pivot: NT Sit to stand: Pablo  Stand to sit: Pablo  Stand pivot: Pablo with WW   Ambulation    NT Side steps with WW MaxA x2 25 feet with WW Pablo   Stair negotiation: ascended and descended  NT NT 2

## 2025-02-25 NOTE — PROGRESS NOTES
[facility]                                Progress note    Subjective:  Denies chest pain, dyspnea, and abdominal pain  Denies nausea  Denies pain to left hip  States that she has had poor PO intake    Objective:  Sitting up in bed, conversing without difficulty  Daughter at the bedside, all questions answered  No acute distress  BP (!) 152/64   Pulse 71   Temp 97.6 °F (36.4 °C) (Axillary)   Resp 18   Ht 1.6 m (5' 3\")   Wt 55.1 kg (121 lb 7.6 oz)   SpO2 96%   BMI 21.52 kg/m²   CONST:  Well developed, well nourished elderly  female who appears stated age. Awake, alert, cooperative, no apparent distress  HEENT:   Head- Normocephalic, atraumatic   Eyes- Conjunctivae pink, anicteric  Throat- Oral mucosa pink and moist  Neck-  No stridor, trachea midline, no jugular venous distention. No adenopathy   CHEST: Chest symmetrical and non-tender to palpation. No accessory muscle use or intercostal retractions  RESPIRATORY: Lung sounds - clear throughout anterior and lateral fields   CARDIOVASCULAR:     No carotid bruit  Heart Inspection- shows no noted pulsations  Heart Palpation- no heaves or thrills; PMI is non-displaced   Heart Ausculation- Regular rate and rhythm, no murmur. No s3, s4 or rub   PV: No lower extremity edema. No varicosities. Pedal pulses palpable, no clubbing or cyanosis. Left hip DSD with scant amount shadow drainage noted  ABDOMEN: Soft, non-tender to light palpation. Bowel sounds present. No palpable masses no organomegaly; no abdominal bruit  MS: Good muscle strength and tone. No atrophy or abnormal movements.   : Deferred  SKIN: Warm and dry no statis dermatitis or ulcers   NEURO / PSYCH: Oriented to person, place and time. Speech clear and appropriate. Follows all commands. Pleasant affect      Recent Labs     02/24/25  0819 02/25/25  0622   WBC 10.1 5.7   HGB 12.0 11.1*   HCT 37.4 34.4   PLT 81*  --        Recent Labs     02/24/25  1403 02/25/25  0622    146  (POD #2)  Mobitz 1 heart block with frequent dropped heartbeats with reoccurring dizziness  Consider repeat ZIO or loop recorder per Electrophysiology for higher degrees of AV block if any  VS stable, continue to monitor  ROBERT in the setting of dehydration with BUN/SCr 17/1 (02/21)--> 56/1.5 (02/24)--> 47/1.3 (02/25)  Continue IV hydration  Monitor BMP  Mild anemia, stable with H&H 11.1/34.4 (02/25), likely dilutional component  Monitor CBC   Hyperglycemia, with no Hx DM  HA1C 5.3% this admission  PT OT, evaluations pending  Discharge planning      Code status: FULL  Consultants: Electrophysiology, Orthopedic Surgery  DVT Prophylaxis Lovenox 40 mg daily  PT/OT  Discharge planning           DELPHINE Arana - CNP,  10:47 AM  2/25/2025

## 2025-02-25 NOTE — PROGRESS NOTES
Occupational Therapy  OT BEDSIDE TREATMENT NOTE   [facility]  [address], [place]      Date:2025  Patient Name: Julita Madrid  MRN: [id number]  : 1945  Room: [address] [place] [address]A     Evaluating OT: Travis Alvarez OTR/L; [id number]        Referring Provider: Eduarda Barajas MD     Specific Provider Orders/Date: OT Eval and Treat 25 0915        Diagnosis: Unwitnessed fall at home - Closed L hip femoral neck fx.    Surgery: 25  LEFT HIP HEMIARTHROPLASTY.    Pertinent Medical History:  has a past medical history of Bradycardia, Common bile duct dilation, GERD (gastroesophageal reflux disease), Hypertension, and Weight loss, unintentional.      Recommended Adaptive Equipment: TBD pending progress.      Precautions:  Fall Risk, WBAT LLE, L anterolateral hip precautions, +alarms, cognition, external catheter, dysphagia diet - soft & bite sized      Assessment of current deficits    [x] Functional mobility            [x]ADLs           [x] Strength                  [x]Cognition    [x] Functional transfers          [x] IADLs         [x] Safety Awareness   [x]Endurance    [x] Fine Coordination                         [x] Balance      [] Vision/perception   []Sensation      []Gross Motor Coordination             [x] ROM           [] Delirium                   [] Motor Control      OT PLAN OF CARE   OT POC based on physician orders, patient diagnosis and results of clinical assessment     Frequency/Duration 1-3 days/wk for 2 weeks PRN   Specific OT Treatment Interventions to include:   * Instruction/training on adapted ADL techniques and AE recommendations to increase functional independence within precautions       * Training on energy conservation strategies, correct breathing pattern and techniques to improve independence/tolerance for self-care routine  * Functional transfer/mobility training/DME recommendations for increased independence,  completion following WBAT LLE & L anterolateral hip precautions   Vitals SpO2 93% on RA.     O2 >95% on RA        Hand Dominance Right    AROM (PROM) Strength Additional Info:  Goal: (PRN)   RUE  Shoulder flexion ~90 degrees, distally WFL. Shoulder 3-/5, distally 3+/5 grossly tested fair   and fair - FMC/dexterity noted during ADL tasks Improve overall RUE strength WFL for participation in functional tasks         LUE Shoulder flexion ~45 degrees, elbow flexion ~90 degrees. Digits WFL. Shoulder 2+/5, distally 3-/5 grossly tested fair   and fair - FMC/dexterity noted during ADL tasks Improve overall LUE strength WFL for participation in functional tasks         Hearing: mild [place]  Sensation: No c/o numbness or tingling  Tone: WFL  Edema: Unremarkable    Comments: Upon arrival pt supine in bed. ADL retraining to increase independence in dressing and grooming tasks, balance and trf training to increase participation in functional mobility and standing aspects of ADLs with increased safety. Pt educated on modified techniques to increase independence and safety during ADLs, bed mobility, and functional transfers. Pt would benefit from continued skilled OT to increase safety and independence with completion of ADL/IADL tasks for functional independence and quality of life.    At end of session pt returned to semi supine, call light within reach, bed alarm on.     Pt has made slow progress towards set goals.     Continue with current plan of care    Treatment Time In:1152            Treatment Time Out: 1215             Treatment Charges: Mins Units   Ther Ex  09061     Manual Therapy 84976     Thera Activities 40509 13 1   ADL/Home Mgt 97636 10 1   Neuro Re-ed 00470     Group Therapy      Orthotic manage/training  55889     Non-Billable Time     Total Timed Treatment 23 2     Roxy Rulf CALDERON/L [id number]

## 2025-02-25 NOTE — PROGRESS NOTES
Department of Orthopedic Surgery  Resident Progress Note    Patient seen and examined. Pain controlled. No new complaints.  Denies chest pain, shortness of breath, dizziness/lightheadedness.  Patient seen resting comfortably bed this morning.  No acute events overnight.    VITALS:  BP (!) 149/64   Pulse 72   Temp 97.5 °F (36.4 °C) (Temporal)   Resp 18   Ht 1.6 m (5' 3\")   Wt 55.1 kg (121 lb 7.6 oz)   SpO2 94%   BMI 21.52 kg/m²     General: alert and able to answer questions however she does mumble nondescript things.    MUSCULOSKELETAL:   left lower extremity:  Dressing C/D/I  Compartments soft and compressible  +PF/DF/EHL  +2/4 DP & PT pulses, Brisk Cap refill, Toes warm and perfused  Distal sensation grossly intact to Peroneals, Sural, Saphenous, and tibial nrs    CBC:   Lab Results   Component Value Date/Time    WBC 10.1 02/24/2025 08:19 AM    HGB 12.0 02/24/2025 08:19 AM    HCT 37.4 02/24/2025 08:19 AM    PLT 81 02/24/2025 08:19 AM     PT/INR:    Lab Results   Component Value Date/Time    PROTIME 11.2 02/21/2025 11:03 AM    INR 1.0 02/21/2025 11:03 AM         ASSESSMENT  S/P left hip hemiarthroplasty-2/23    PLAN      Continue physical therapy and protocol: WBAT -left LE  24-hour antibiotic coverage completed  Deep venous thrombosis prophylaxis -Lovenox, early mobilization  Orthopedics will continue to follow peripherally during the remainder of her hospital stay.  Please reach out with any questions or concerns.  PT/OT  Pain Control: IV and PO  Monitor H&H  D/C Plan: Pending PT and OT recommendations

## 2025-02-25 NOTE — PROGRESS NOTES
Patient received the Sacrament of the Anointing of the Sick by Father Bayron Gibbons on February 24, 2025.    If additional support is requested or needed please reach out to Spiritual Health ([phone]).    Chap. Kris Cespedes MDIV, BCC

## 2025-02-25 NOTE — PROGRESS NOTES
DVT Prophylaxis Adjustment Policy (DVT Prophylaxis)     This patient is on DVT Prophylaxis medication that requires a dose adjustment      Date Body Weight IBW  Adjusted BW SCr  CrCl Dialysis status   2/25/2025 55.1 kg (121 lb 7.6 oz) Ideal body weight: 52.4 kg (115 lb 8.3 oz)  Adjusted ideal body weight: 53.5 kg (117 lb 14.4 oz) Serum creatinine: 1.3 mg/dL (H) 02/25/25 0622  Estimated creatinine clearance: 28 mL/min (A) N/a       Pharmacy has dose-adjusted the DVT Prophylaxis regimen to match   the recommendations from the following table        Ordered Medication:Lovenox 40mg daily    Order Changed/converted to: Lovenox 30mg daily      These changes were made per protocol according to the [facility] Pharmacist   Review for Appropriate Use and Automatic Dose Adjustments of   Subcutaneous Anticoagulants Policy     *Please note this dose may need readjusted if patient's condition changes.    Please contact pharmacy with any questions regarding these changes.    Anisa Torres RPH  2/25/2025  10:16 AM

## 2025-02-25 NOTE — PLAN OF CARE
Problem: Discharge Planning  Goal: Discharge to home or other facility with appropriate resources  Outcome: Progressing     Problem: Pain  Goal: Verbalizes/displays adequate comfort level or baseline comfort level  Outcome: Progressing     Problem: Skin/Tissue Integrity  Goal: Skin integrity remains intact  Description: 1.  Monitor for areas of redness and/or skin breakdown  2.  Assess vascular access sites hourly  3.  Every 4-6 hours minimum:  Change oxygen saturation probe site  4.  Every 4-6 hours:  If on nasal continuous positive airway pressure, respiratory therapy assess nares and determine need for appliance change or resting period  Outcome: Progressing  Flowsheets (Taken 2/25/2025 0900)  Skin Integrity Remains Intact: Monitor for areas of redness and/or skin breakdown

## 2025-02-25 NOTE — CARE COORDINATION
2/25:  Update CM Note:  Pt presented to the ER for a fall from home.  Pt is on room air at 94%, Iv Fluids, Iv Apresoline PRN, Iv Fentanyl PRN & Sq Lovenox.  PT 7/24 OT 10/24.  Speech eval pending.  Per son dc plan is home with family to transport.  CM discuss PT/OT scores & pt's son was at bedside when they worked with her.  They are open to HHC & choice [facility].  CM sent referral & they can accept.  HHC order placed.  Sw/CM will continue to follow.  Electronically signed by Nilda Hardin RN on 2/25/2025 at 12:30 PM

## 2025-02-26 LAB
ALBUMIN SERPL-MCNC: 2.6 G/DL (ref 3.5–5.2)
ALP SERPL-CCNC: 67 U/L (ref 35–104)
ALT SERPL-CCNC: <5 U/L (ref 0–32)
ANION GAP SERPL CALCULATED.3IONS-SCNC: 9 MMOL/L (ref 7–16)
AST SERPL-CCNC: 40 U/L (ref 0–31)
BASOPHILS # BLD: 0.01 K/UL (ref 0–0.2)
BASOPHILS NFR BLD: 0 % (ref 0–2)
BILIRUB SERPL-MCNC: 0.7 MG/DL (ref 0–1.2)
BUN SERPL-MCNC: 37 MG/DL (ref 6–23)
CALCIUM SERPL-MCNC: 8 MG/DL (ref 8.6–10.2)
CHLORIDE SERPL-SCNC: 109 MMOL/L (ref 98–107)
CO2 SERPL-SCNC: 24 MMOL/L (ref 22–29)
CREAT SERPL-MCNC: 1.2 MG/DL (ref 0.5–1)
EKG ATRIAL RATE: 72 BPM
EKG ATRIAL RATE: 82 BPM
EKG P AXIS: 104 DEGREES
EKG P-R INTERVAL: 176 MS
EKG Q-T INTERVAL: 428 MS
EKG Q-T INTERVAL: 486 MS
EKG QRS DURATION: 86 MS
EKG QRS DURATION: 88 MS
EKG QTC CALCULATION (BAZETT): 375 MS
EKG QTC CALCULATION (BAZETT): 434 MS
EKG R AXIS: -28 DEGREES
EKG R AXIS: -36 DEGREES
EKG T AXIS: -23 DEGREES
EKG T AXIS: 106 DEGREES
EKG VENTRICULAR RATE: 36 BPM
EKG VENTRICULAR RATE: 62 BPM
EOSINOPHIL # BLD: 0.07 K/UL (ref 0.05–0.5)
EOSINOPHILS RELATIVE PERCENT: 1 % (ref 0–6)
ERYTHROCYTE [DISTWIDTH] IN BLOOD BY AUTOMATED COUNT: 13.1 % (ref 11.5–15)
GFR, ESTIMATED: 47 ML/MIN/1.73M2
GLUCOSE SERPL-MCNC: 100 MG/DL (ref 74–99)
HCT VFR BLD AUTO: 33.7 % (ref 34–48)
HGB BLD-MCNC: 10.7 G/DL (ref 11.5–15.5)
IMM GRANULOCYTES # BLD AUTO: 0.03 K/UL (ref 0–0.58)
IMM GRANULOCYTES NFR BLD: 1 % (ref 0–5)
LYMPHOCYTES NFR BLD: 0.64 K/UL (ref 1.5–4)
LYMPHOCYTES RELATIVE PERCENT: 13 % (ref 20–42)
MCH RBC QN AUTO: 31.7 PG (ref 26–35)
MCHC RBC AUTO-ENTMCNC: 31.8 G/DL (ref 32–34.5)
MCV RBC AUTO: 99.7 FL (ref 80–99.9)
MONOCYTES NFR BLD: 0.46 K/UL (ref 0.1–0.95)
MONOCYTES NFR BLD: 9 % (ref 2–12)
NEUTROPHILS NFR BLD: 76 % (ref 43–80)
NEUTS SEG NFR BLD: 3.91 K/UL (ref 1.8–7.3)
PLATELET CONFIRMATION: NORMAL
PLATELET, FLUORESCENCE: 84 K/UL (ref 130–450)
PMV BLD AUTO: 11.7 FL (ref 7–12)
POTASSIUM SERPL-SCNC: 4 MMOL/L (ref 3.5–5)
PROT SERPL-MCNC: 5 G/DL (ref 6.4–8.3)
RBC # BLD AUTO: 3.38 M/UL (ref 3.5–5.5)
SODIUM SERPL-SCNC: 142 MMOL/L (ref 132–146)
SURGICAL PATHOLOGY REPORT: NORMAL
WBC OTHER # BLD: 5.1 K/UL (ref 4.5–11.5)

## 2025-02-26 PROCEDURE — 80053 COMPREHEN METABOLIC PANEL: CPT

## 2025-02-26 PROCEDURE — 97530 THERAPEUTIC ACTIVITIES: CPT

## 2025-02-26 PROCEDURE — 93005 ELECTROCARDIOGRAM TRACING: CPT | Performed by: NURSE PRACTITIONER

## 2025-02-26 PROCEDURE — 36415 COLL VENOUS BLD VENIPUNCTURE: CPT

## 2025-02-26 PROCEDURE — 6360000002 HC RX W HCPCS: Performed by: NURSE PRACTITIONER

## 2025-02-26 PROCEDURE — 93010 ELECTROCARDIOGRAM REPORT: CPT | Performed by: INTERNAL MEDICINE

## 2025-02-26 PROCEDURE — 2140000000 HC CCU INTERMEDIATE R&B

## 2025-02-26 PROCEDURE — 99233 SBSQ HOSP IP/OBS HIGH 50: CPT | Performed by: STUDENT IN AN ORGANIZED HEALTH CARE EDUCATION/TRAINING PROGRAM

## 2025-02-26 PROCEDURE — 97535 SELF CARE MNGMENT TRAINING: CPT

## 2025-02-26 PROCEDURE — 2580000003 HC RX 258: Performed by: NURSE PRACTITIONER

## 2025-02-26 PROCEDURE — 85025 COMPLETE CBC W/AUTO DIFF WBC: CPT

## 2025-02-26 RX ORDER — ATROPINE SULFATE 0.1 MG/ML
0.5 INJECTION INTRAVENOUS ONCE
Status: COMPLETED | OUTPATIENT
Start: 2025-02-26 | End: 2025-02-26

## 2025-02-26 RX ADMIN — ENOXAPARIN SODIUM 30 MG: 100 INJECTION SUBCUTANEOUS at 09:15

## 2025-02-26 RX ADMIN — SODIUM CHLORIDE, SODIUM LACTATE, POTASSIUM CHLORIDE, AND CALCIUM CHLORIDE: .6; .31; .03; .02 INJECTION, SOLUTION INTRAVENOUS at 09:15

## 2025-02-26 RX ADMIN — ATROPINE SULFATE INJECTION 0.5 MG: 0.1 INJECTION, SOLUTION INTRAVENOUS at 15:35

## 2025-02-26 ASSESSMENT — PAIN SCALES - GENERAL: PAINLEVEL_OUTOF10: 0

## 2025-02-26 NOTE — PROGRESS NOTES
Patient's HR maintaining between 34-41. She is awake, in bed. She is asymptomatic at this time. EKG shows SR with 2nd AV block with 2:1 AV conduction. Notified primary NP. New order to re-consult EP

## 2025-02-26 NOTE — CARE COORDINATION
2/26:  Update CM Note:  CM spoke with charge who advise pt's HR 35-40 & likely hold dc today.  Electronically signed by Nilda Hardin RN on 2/26/2025 at 12:14 PM

## 2025-02-26 NOTE — PLAN OF CARE
Problem: Pain  Goal: Verbalizes/displays adequate comfort level or baseline comfort level  Outcome: Progressing     Problem: Skin/Tissue Integrity  Goal: Skin integrity remains intact  Description: 1.  Monitor for areas of redness and/or skin breakdown  2.  Assess vascular access sites hourly  3.  Every 4-6 hours minimum:  Change oxygen saturation probe site  4.  Every 4-6 hours:  If on nasal continuous positive airway pressure, respiratory therapy assess nares and determine need for appliance change or resting period  Outcome: Progressing     Problem: ABCDS Injury Assessment  Goal: Absence of physical injury  Outcome: Progressing

## 2025-02-26 NOTE — PROGRESS NOTES
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 Potential need for hospital bed if home DC    SUBJECTIVE:    Pt lives with  in a 1 story home with 2 stairs to enter and 1 rail.  Bed is on 1st floor and bath is on 1st floor.  Pt ambulated with no AD PTA. Son present at time of evaluation states that he checks in on them regularly.    OBJECTIVE:   Initial Evaluation  Date: 25 Treatment  25 Short Term/ Long Term   Goals   AM-PAC 6 Clicks     Was pt agreeable to Eval/treatment? yes yes    Does pt have pain? L hip pain with mobility, does not rate L hip pain with mobility     Bed Mobility  Rolling: MaxA  Supine to sit: MaxA x2  Sit to supine: MaxA x2  Scooting: MaxA Rolling: MaxA  Supine to sit: MaxA x2  Sit to supine: MaxA x2  Scooting: MaxA Rolling: Pablo  Supine to sit: Pablo  Sit to supine: Pablo  Scooting: Pablo   Transfers Sit to stand: MaxA x2  Stand to sit: MaxA x2  Stand pivot: NT Sit to stand: ModA x2  Stand to sit: ModA x2  Stand pivot: MaxA x2 Sit to stand: Pablo  Stand to sit: Pablo  Stand pivot: Pablo with WW   Ambulation    NT Few steps to chair with HHA MaxA x2 25 feet with WW Pablo   Stair negotiation: ascended and

## 2025-02-26 NOTE — PROGRESS NOTES
[facility]                                Progress note    Subjective:  Denies chest pain, dyspnea, and abdominal pain  Denies nausea  Denies pain to left hip  States that she has had poor PO intake    Objective:  Sitting up in bed, conversing without difficulty  Daughter at the bedside, all questions answered  No acute distress  BP (!) 141/44   Pulse (!) 36   Temp 97.8 °F (36.6 °C) (Temporal)   Resp 17   Ht 1.6 m (5' 3\")   Wt 56.9 kg (125 lb 7.1 oz)   SpO2 100%   BMI 22.22 kg/m²   CONST:  Well developed, well nourished elderly  female who appears stated age. Awake, alert, cooperative, no apparent distress  HEENT:   Head- Normocephalic, atraumatic   Eyes- Conjunctivae pink, anicteric  Throat- Oral mucosa pink and moist  Neck-  No stridor, trachea midline, no jugular venous distention. No adenopathy   CHEST: Chest symmetrical and non-tender to palpation. No accessory muscle use or intercostal retractions  RESPIRATORY: Lung sounds - clear throughout anterior and lateral fields   CARDIOVASCULAR:     No carotid bruit  Heart Inspection- shows no noted pulsations  Heart Palpation- no heaves or thrills; PMI is non-displaced   Heart Ausculation- Regular rate and rhythm, no murmur. No s3, s4 or rub   PV: No lower extremity edema. No varicosities. Pedal pulses palpable, no clubbing or cyanosis. Left hip DSD with scant amount shadow drainage noted  ABDOMEN: Soft, non-tender to light palpation. Bowel sounds present. No palpable masses no organomegaly; no abdominal bruit  MS: Good muscle strength and tone. No atrophy or abnormal movements.   : Deferred  SKIN: Warm and dry no statis dermatitis or ulcers   NEURO / PSYCH: Oriented to person, place and time. Speech clear and appropriate. Follows all commands. Pleasant affect      Recent Labs     02/24/25  0819 02/25/25  0622 02/26/25  0452   WBC 10.1 5.7 5.1   HGB 12.0 11.1* 10.7*   HCT 37.4 34.4 33.7*   PLT 81*  --   --        Recent Labs      02/24/25  1403 02/25/25  0622 02/26/25  0452    146 142   K 4.2 4.1 4.0   * 111* 109*   CO2 22 20* 24   BUN 56* 47* 37*   CREATININE 1.5* 1.3* 1.2*   CALCIUM 8.1* 8.2* 8.0*     02/21/2025 Left Hip XRay:  Acute mildly displaced fracture of the left proximal femoral neck subcapital  region.  Foreshortening present.     02/21/2025 Left Femur XRay:  Acute mildly displaced fracture of the left proximal femoral neck subcapital  region.  Foreshortening present.     02/21/2025 CT Head WO contrast:  No acute intracranial abnormality.      02/21/2025 CT Cervical Spine WO contrast:  No acute abnormality of the cervical spine.     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 *        Plan:  ORTHO  STAT EKG  CARD     02/23/2025  S/p fall with subsequent left femur fracture  Left hip hemiarthroplasty TBD per Orthopedic Surgery  Mobitz 1 heart block with frequent dropped heartbeats with reoccurring dizziness  Sinus bradycardia with HR in the 30s-40s with sleep  Consider repeat ZIO or loop recorder per Electrophysiology for higher degrees of AV block if any  Monitor VS  Monitor BMP, CBC (pending)    02/24/2025  S/p fall with subsequent left femur fracture  Left femur closed fracture s/p left hip hemiarthroplasty (POD #1)  Mobitz 1 heart block with frequent dropped heartbeats with reoccurring dizziness  Consider repeat ZIO or loop recorder per Electrophysiology for higher degrees of AV block if any  Monitor VS  Monitor BMP, CBC   Hyperglycemia, with no Hx DM, check HA1C  PT OT  Discharge planning, home per Case Management discussion with son     02/25/2025  S/p fall with

## 2025-02-26 NOTE — PROGRESS NOTES
Occupational Therapy  OT BEDSIDE TREATMENT NOTE   [facility]  [address], [place]      Date:2025  Patient Name: Julita Madrid  MRN: [id number]  : 1945  Room: [address] [place] [address]A     Evaluating OT: Travis Alvarez OTR/L; [id number]        Referring Provider: Eduarda Barajas MD     Specific Provider Orders/Date: OT Eval and Treat 25 0915        Diagnosis: Unwitnessed fall at home - Closed L hip femoral neck fx.    Surgery: 25  LEFT HIP HEMIARTHROPLASTY.    Pertinent Medical History:  has a past medical history of Bradycardia, Common bile duct dilation, GERD (gastroesophageal reflux disease), Hypertension, and Weight loss, unintentional.      Recommended Adaptive Equipment: TBD pending progress.      Precautions:  Fall Risk, WBAT LLE, L anterolateral hip precautions, +alarms, cognition, external catheter, dysphagia diet - soft & bite sized      Assessment of current deficits    [x] Functional mobility            [x]ADLs           [x] Strength                  [x]Cognition    [x] Functional transfers          [x] IADLs         [x] Safety Awareness   [x]Endurance    [x] Fine Coordination                         [x] Balance      [] Vision/perception   []Sensation      []Gross Motor Coordination             [x] ROM           [] Delirium                   [] Motor Control      OT PLAN OF CARE   OT POC based on physician orders, patient diagnosis and results of clinical assessment     Frequency/Duration 1-3 days/wk for 2 weeks PRN   Specific OT Treatment Interventions to include:   * Instruction/training on adapted ADL techniques and AE recommendations to increase functional independence within precautions       * Training on energy conservation strategies, correct breathing pattern and techniques to improve independence/tolerance for self-care routine  * Functional transfer/mobility training/DME recommendations for increased independence,

## 2025-02-26 NOTE — CONSULTS
Results   Component Value Date/Time    MG 2.2 02/22/2025 05:29 AM     Telemetry: Sinus with episodes of second-degree AV block     Assessment/plan:  2* AV block  - In 2022, she was diagnosed with 2* type I AV block.  - In 1/2024, cardiac event monitor reported episodes of second-degree type I AV block during sleep hours and 1 sinus pause of 3 seconds, which were asymptomatic.   - Patient now having episodes of 2* type I and 2:1 AV block in the setting of significant pain following left hip fracture hemiarthroplasty.  - No clear symptoms related to bradycardia.  - Monitor on telemetry.    Left hip fracture sp hemiarthroplasty  (2/23/25)  - Management per Orthopedic surgery.    ROBERT  - Cr = baseline = 1.0; peak = 1.5; today = 1.2  - Management per admitting service.    Anemia  - Likely related to recent surgery.  - Management per admitting service.    Thank you for allowing me to participate in your patient's care.  Please call me if there are any questions.      Long Talley, DO   Cardiac Electrophysiology  [facility]  [facility]

## 2025-02-26 NOTE — CARE COORDINATION
2/26:  Update CM Note:  Pt presented to the ER for a fall from home.  Pt is on room air at 96%, Iv Fluids, Iv Apresoline PRN, Iv Fentanyl PRN & Sq Lovenox.  PT 7/24 OT 10/24.  Pt is a possible dc today.  Per son dc plan is home with family to transport.  Pt is open to HHC choice [facility] & they have accepted * HHC order placed.  Sw/CANDICE will continue to follow.  Electronically signed by Nilda Hardin RN on 2/26/2025 at 10:25 AM

## 2025-02-27 LAB
ALBUMIN SERPL-MCNC: 2.6 G/DL (ref 3.5–5.2)
ALP SERPL-CCNC: 70 U/L (ref 35–104)
ALT SERPL-CCNC: 5 U/L (ref 0–32)
ANION GAP SERPL CALCULATED.3IONS-SCNC: 16 MMOL/L (ref 7–16)
AST SERPL-CCNC: 41 U/L (ref 0–31)
BASOPHILS # BLD: 0.01 K/UL (ref 0–0.2)
BASOPHILS NFR BLD: 0 % (ref 0–2)
BILIRUB SERPL-MCNC: 0.8 MG/DL (ref 0–1.2)
BUN SERPL-MCNC: 28 MG/DL (ref 6–23)
CALCIUM SERPL-MCNC: 8 MG/DL (ref 8.6–10.2)
CHLORIDE SERPL-SCNC: 111 MMOL/L (ref 98–107)
CO2 SERPL-SCNC: 17 MMOL/L (ref 22–29)
CREAT SERPL-MCNC: 1.2 MG/DL (ref 0.5–1)
EOSINOPHIL # BLD: 0.11 K/UL (ref 0.05–0.5)
EOSINOPHILS RELATIVE PERCENT: 2 % (ref 0–6)
ERYTHROCYTE [DISTWIDTH] IN BLOOD BY AUTOMATED COUNT: 13 % (ref 11.5–15)
GFR, ESTIMATED: 47 ML/MIN/1.73M2
GLUCOSE SERPL-MCNC: 107 MG/DL (ref 74–99)
HCT VFR BLD AUTO: 34 % (ref 34–48)
HGB BLD-MCNC: 10.8 G/DL (ref 11.5–15.5)
IMM GRANULOCYTES # BLD AUTO: 0.04 K/UL (ref 0–0.58)
IMM GRANULOCYTES NFR BLD: 1 % (ref 0–5)
LYMPHOCYTES NFR BLD: 0.62 K/UL (ref 1.5–4)
LYMPHOCYTES RELATIVE PERCENT: 11 % (ref 20–42)
MCH RBC QN AUTO: 31.5 PG (ref 26–35)
MCHC RBC AUTO-ENTMCNC: 31.8 G/DL (ref 32–34.5)
MCV RBC AUTO: 99.1 FL (ref 80–99.9)
MONOCYTES NFR BLD: 0.56 K/UL (ref 0.1–0.95)
MONOCYTES NFR BLD: 10 % (ref 2–12)
NEUTROPHILS NFR BLD: 77 % (ref 43–80)
NEUTS SEG NFR BLD: 4.55 K/UL (ref 1.8–7.3)
PLATELET # BLD AUTO: 97 K/UL (ref 130–450)
PLATELET CONFIRMATION: NORMAL
PMV BLD AUTO: 11.9 FL (ref 7–12)
POTASSIUM SERPL-SCNC: 4.2 MMOL/L (ref 3.5–5)
PROT SERPL-MCNC: 4.9 G/DL (ref 6.4–8.3)
RBC # BLD AUTO: 3.43 M/UL (ref 3.5–5.5)
SODIUM SERPL-SCNC: 144 MMOL/L (ref 132–146)
WBC OTHER # BLD: 5.9 K/UL (ref 4.5–11.5)

## 2025-02-27 PROCEDURE — 36415 COLL VENOUS BLD VENIPUNCTURE: CPT

## 2025-02-27 PROCEDURE — 85025 COMPLETE CBC W/AUTO DIFF WBC: CPT

## 2025-02-27 PROCEDURE — 97535 SELF CARE MNGMENT TRAINING: CPT

## 2025-02-27 PROCEDURE — 6360000002 HC RX W HCPCS: Performed by: NURSE PRACTITIONER

## 2025-02-27 PROCEDURE — 97530 THERAPEUTIC ACTIVITIES: CPT

## 2025-02-27 PROCEDURE — 80053 COMPREHEN METABOLIC PANEL: CPT

## 2025-02-27 PROCEDURE — 2580000003 HC RX 258: Performed by: NURSE PRACTITIONER

## 2025-02-27 PROCEDURE — 2500000003 HC RX 250 WO HCPCS: Performed by: NURSE PRACTITIONER

## 2025-02-27 PROCEDURE — 2140000000 HC CCU INTERMEDIATE R&B

## 2025-02-27 RX ORDER — VALSARTAN 160 MG/1
80 TABLET ORAL DAILY
Qty: 90 TABLET | Refills: 2 | Status: SHIPPED | OUTPATIENT
Start: 2025-02-27

## 2025-02-27 RX ADMIN — SODIUM CHLORIDE, PRESERVATIVE FREE 10 ML: 5 INJECTION INTRAVENOUS at 10:29

## 2025-02-27 RX ADMIN — SODIUM CHLORIDE, PRESERVATIVE FREE 10 ML: 5 INJECTION INTRAVENOUS at 21:29

## 2025-02-27 RX ADMIN — ENOXAPARIN SODIUM 30 MG: 100 INJECTION SUBCUTANEOUS at 10:29

## 2025-02-27 RX ADMIN — SODIUM CHLORIDE, SODIUM LACTATE, POTASSIUM CHLORIDE, AND CALCIUM CHLORIDE: .6; .31; .03; .02 INJECTION, SOLUTION INTRAVENOUS at 03:55

## 2025-02-27 NOTE — PROGRESS NOTES
Physical Therapy  Physical Therapy Treatment     Name: Julita Madrid  : 1945  MRN: [id number]      Date of Service: 2025    Evaluating PT:  Betty Elder PT, DPT [id number]    Room #:  6420/6420-A  Diagnosis:  Closed nondisp intertrochanteric fracture of left femur with nonunion [S72.145K]  Closed fracture of left hip, initial encounter ([facility]) [S72.002A]  Closed head injury, initial encounter [S09.90XA]  Fall, initial encounter [W19.XXXA]  PMHx/PSHx:    Past Medical History:   Diagnosis Date    Bradycardia 3/6/2017    Common bile duct dilation     GERD (gastroesophageal reflux disease)     Hypertension     Weight loss, unintentional       Procedure/Surgery:  LEFT HIP HEMIARTHROPLASTY   Precautions:  Falls, WBAT LLE, anterolateral hip precautions, cognition  Equipment Needs:  TBD, pt has WW and WC. Potential need for hospital bed if home DC    SUBJECTIVE:    Pt lives with  in a 1 story home with 2 stairs to enter and 1 rail.  Bed is on 1st floor and bath is on 1st floor.  Pt ambulated with no AD PTA. Son present at time of evaluation states that he checks in on them regularly.    OBJECTIVE:   Initial Evaluation  Date: 25 Treatment  25 Short Term/ Long Term   Goals   AM-PAC 6 Clicks     Was pt agreeable to Eval/treatment? yes yes    Does pt have pain? L hip pain with mobility, does not rate L hip pain with mobility     Bed Mobility  Rolling: MaxA  Supine to sit: MaxA x2  Sit to supine: MaxA x2  Scooting: MaxA Rolling: MaxA  Supine to sit: MaxA x2  Sit to supine: MaxA x2  Scooting: MaxA Rolling: Pablo  Supine to sit: Pablo  Sit to supine: Pablo  Scooting: Pablo   Transfers Sit to stand: MaxA x2  Stand to sit: MaxA x2  Stand pivot: NT Sit to stand: ModA x2  Stand to sit: ModA x2  Stand pivot: ModA x2 Sit to stand: Pablo  Stand to sit: Pablo  Stand pivot: Pablo with WW   Ambulation    NT Few steps to chair with HHA ModA x2 25 feet with WW Pablo   Stair negotiation: ascended and  minutes    Betty Elder PT, DPT  [id number]

## 2025-02-27 NOTE — PROGRESS NOTES
CLINICAL PHARMACY NOTE: MEDS TO BEDS    Total # of Prescriptions Filled: 0   The following medications were delivered to the patient:  Valsartan 160 mg    Additional Documentation:   Did not deliver any meds to patient. She just filled her valsartan 160mg on 02/10. She should have some at home. Her next fill is per ins 04/18. I notified her nurse Flora,

## 2025-02-27 NOTE — CARE COORDINATION
2/27:  Update CM Note:  Pt presented to the ER for a fall from home.  Pt is on room air at 97%, Iv Fluids, Iv Apresoline PRN, Iv Fentanyl PRN & Sq Lovenox.  PT 7/24 OT 10/24.  Pt is a possible dc today.  Per son dc plan is home with family to transport.  Pt is open to HHC choice [facility] & they have accepted & HHC order placed.  Sw/CANDICE will continue to follow.  Electronically signed by Nilda Hardin RN on 2/27/2025 at 11:30 AM

## 2025-02-27 NOTE — PROGRESS NOTES
Spoke to Atiya with [facility], states that ETA is 2am, they are trying to outsource and will call back if a sooner time is available.

## 2025-02-27 NOTE — PLAN OF CARE
Problem: Discharge Planning  Goal: Discharge to home or other facility with appropriate resources  Outcome: Progressing  Flowsheets (Taken 2/27/2025 0900)  Discharge to home or other facility with appropriate resources:   Identify barriers to discharge with patient and caregiver   Arrange for needed discharge resources and transportation as appropriate   Identify discharge learning needs (meds, wound care, etc)     Problem: Pain  Goal: Verbalizes/displays adequate comfort level or baseline comfort level  Outcome: Progressing     Problem: Skin/Tissue Integrity  Goal: Skin integrity remains intact  Description: 1.  Monitor for areas of redness and/or skin breakdown  2.  Assess vascular access sites hourly  3.  Every 4-6 hours minimum:  Change oxygen saturation probe site  4.  Every 4-6 hours:  If on nasal continuous positive airway pressure, respiratory therapy assess nares and determine need for appliance change or resting period  Outcome: Progressing  Flowsheets  Taken 2/27/2025 1200  Skin Integrity Remains Intact: Monitor for areas of redness and/or skin breakdown  Taken 2/27/2025 0900  Skin Integrity Remains Intact: Monitor for areas of redness and/or skin breakdown  Taken 2/27/2025 0735  Skin Integrity Remains Intact: Monitor for areas of redness and/or skin breakdown     Problem: Safety - Adult  Goal: Free from fall injury  Outcome: Progressing  Flowsheets (Taken 2/27/2025 0735)  Free From Fall Injury: Instruct family/caregiver on patient safety     Problem: ABCDS Injury Assessment  Goal: Absence of physical injury  Outcome: Progressing  Flowsheets (Taken 2/27/2025 0735)  Absence of Physical Injury: Implement safety measures based on patient assessment

## 2025-02-27 NOTE — DISCHARGE SUMMARY
[facility]   Discharge summary   Patient ID:  Julita Madrid  [id number]  79 y.o.  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    ROBERT (acute kidney injury)    Fall    Closed fracture of left hip ([facility])       Discharge Diagnoses: Left femur fracture status post left hip hemiarthroplasty, bradycardia with Mobitz 1 heart block    Consults: Electrophysiology, orthopedics    Procedures: Left hip Beto arthroplasty    Hospital Course: Plan:  ORTHO  STAT EKG  CARD     02/23/2025  S/p fall with subsequent left femur fracture  Left hip hemiarthroplasty TBD per Orthopedic Surgery  Mobitz 1 heart block with frequent dropped heartbeats with reoccurring dizziness  Sinus bradycardia with HR in the 30s-40s with sleep  Consider repeat ZIO or loop recorder per Electrophysiology for higher degrees of AV block if any  Monitor VS  Monitor BMP, CBC (pending)     02/24/2025  S/p fall with subsequent left femur fracture  Left femur closed fracture s/p left hip hemiarthroplasty (POD #1)  Mobitz 1 heart block with frequent dropped heartbeats with reoccurring dizziness  Consider repeat ZIO or loop recorder per Electrophysiology for higher degrees of AV block if any  Monitor VS  Monitor BMP, CBC   Hyperglycemia, with no Hx DM, check HA1C  PT OT  Discharge planning, home per Case Management discussion with son      02/25/2025  S/p fall with subsequent left femur fracture  Left femur closed fracture s/p left hip hemiarthroplasty (POD #2)  Mobitz 1 heart block with frequent dropped heartbeats with  4.1 4.0 4.2   * 109* 111*   CO2 20* 24 17*   BUN 47* 37* 28*   CREATININE 1.3* 1.2* 1.2*   CALCIUM 8.2* 8.0* 8.0*       CT CERVICAL SPINE WO CONTRAST    Result Date: 2/21/2025  EXAMINATION: CT OF THE CERVICAL SPINE WITHOUT CONTRAST 2/21/2025 12:53 pm TECHNIQUE: CT of the cervical spine was performed without the administration of intravenous contrast. Multiplanar reformatted images are provided for review. Automated exposure control, iterative reconstruction, and/or weight based adjustment of the mA/kV was utilized to reduce the radiation dose to as low as reasonably achievable. COMPARISON: None. HISTORY: ORDERING SYSTEM PROVIDED HISTORY: trama TECHNOLOGIST PROVIDED HISTORY: Reason for exam:->trama Decision Support Exception - unselect if not a suspected or confirmed emergency medical condition->Emergency Medical Condition (MA) What reading provider will be dictating this exam?->CRC FINDINGS: BONES/ALIGNMENT: There is no acute fracture or traumatic malalignment. DEGENERATIVE CHANGES: No severe osseous spinal canal stenosis. SOFT TISSUES: There is no prevertebral soft tissue swelling.     No acute abnormality of the cervical spine.     CT HEAD WO CONTRAST    Result Date: 2/21/2025  EXAMINATION: CT OF THE HEAD WITHOUT CONTRAST  2/21/2025 12:53 pm TECHNIQUE: CT of the head was performed without the administration of intravenous contrast. Automated exposure control, iterative reconstruction, and/or weight based adjustment of the mA/kV was utilized to reduce the radiation dose to as low as reasonably achievable. COMPARISON: None. HISTORY: ORDERING SYSTEM PROVIDED HISTORY: trauma TECHNOLOGIST PROVIDED HISTORY: Has a \"code stroke\" or \"stroke alert\" been called?->No Reason for exam:->trauma Decision Support Exception - unselect if not a suspected or confirmed emergency medical condition->Emergency Medical Condition (MA) What reading provider will be dictating this exam?->CRC FINDINGS: BRAIN/VENTRICLES: There is no

## 2025-02-27 NOTE — CARE COORDINATION
2.27:  Update CM Note:  CM spoke with [facility] & placed pt on will call for a ambulance.  The cost if not covered by insurance is $660.00 Base with 23.10 per mile apprx 3-4 miles.  WC is $104.50 with 23.10 per mile.  Per [facility] they will attempt to bill insurance if not the son is agreeable to pay an cost not covered.  Electronically signed by Nilda Hardin RN on 2/27/2025 at 1:05 PM

## 2025-02-27 NOTE — PROGRESS NOTES
Occupational Therapy  OT BEDSIDE TREATMENT NOTE   [facility]  [address], [place]      Date:2025  Patient Name: Julita Madrid  MRN: [id number]  : 1945  Room: [address] [place] [address]-A     Evaluating OT: Travis Alvarez OTR/L; [id number]        Referring Provider: Eduarda Barajas MD     Specific Provider Orders/Date: OT Eval and Treat 25 0915        Diagnosis: Unwitnessed fall at home - Closed L hip femoral neck fx.    Surgery: 25  LEFT HIP HEMIARTHROPLASTY.    Pertinent Medical History:  has a past medical history of Bradycardia, Common bile duct dilation, GERD (gastroesophageal reflux disease), Hypertension, and Weight loss, unintentional.      Recommended Adaptive Equipment: TBD pending progress.      Precautions:  Fall Risk, WBAT LLE, L anterolateral hip precautions, +alarms, cognition, external catheter, dysphagia diet - soft & bite sized      Assessment of current deficits    [x] Functional mobility            [x]ADLs           [x] Strength                  [x]Cognition    [x] Functional transfers          [x] IADLs         [x] Safety Awareness   [x]Endurance    [x] Fine Coordination                         [x] Balance      [] Vision/perception   []Sensation      []Gross Motor Coordination             [x] ROM           [] Delirium                   [] Motor Control      OT PLAN OF CARE   OT POC based on physician orders, patient diagnosis and results of clinical assessment     Frequency/Duration 1-3 days/wk for 2 weeks PRN   Specific OT Treatment Interventions to include:   * Instruction/training on adapted ADL techniques and AE recommendations to increase functional independence within precautions       * Training on energy conservation strategies, correct breathing pattern and techniques to improve independence/tolerance for self-care routine  * Functional transfer/mobility training/DME recommendations for increased independence,

## 2025-02-27 NOTE — PROGRESS NOTES
Spoke with Atiya with [facility] and notified of need for transport for discharge. Per Atiya, next available is 8pm. This RN requested they attempt to outsource as pt is discharging home. Atiya states she will attempt to outsource and call the unit back with final pickup time.

## 2025-02-27 NOTE — PROGRESS NOTES
Spoke to Atiya from [facility] again, states they tried 5-6 other outsources and unable to  pt before 2 am. Son does not wish to have mother transferred at this time.  [facility] scheduled for 9am.

## 2025-02-28 VITALS
WEIGHT: 129.63 LBS | RESPIRATION RATE: 14 BRPM | SYSTOLIC BLOOD PRESSURE: 162 MMHG | HEIGHT: 63 IN | BODY MASS INDEX: 22.97 KG/M2 | OXYGEN SATURATION: 96 % | HEART RATE: 64 BPM | TEMPERATURE: 97.3 F | DIASTOLIC BLOOD PRESSURE: 69 MMHG

## 2025-02-28 LAB
ALBUMIN SERPL-MCNC: 2.7 G/DL (ref 3.5–5.2)
ALP SERPL-CCNC: 74 U/L (ref 35–104)
ALT SERPL-CCNC: 8 U/L (ref 0–32)
ANION GAP SERPL CALCULATED.3IONS-SCNC: 8 MMOL/L (ref 7–16)
AST SERPL-CCNC: 41 U/L (ref 0–31)
BASOPHILS # BLD: 0.01 K/UL (ref 0–0.2)
BASOPHILS NFR BLD: 0 % (ref 0–2)
BILIRUB SERPL-MCNC: 0.9 MG/DL (ref 0–1.2)
BUN SERPL-MCNC: 22 MG/DL (ref 6–23)
CALCIUM SERPL-MCNC: 7.8 MG/DL (ref 8.6–10.2)
CHLORIDE SERPL-SCNC: 109 MMOL/L (ref 98–107)
CO2 SERPL-SCNC: 21 MMOL/L (ref 22–29)
CREAT SERPL-MCNC: 1.1 MG/DL (ref 0.5–1)
EOSINOPHIL # BLD: 0.15 K/UL (ref 0.05–0.5)
EOSINOPHILS RELATIVE PERCENT: 3 % (ref 0–6)
ERYTHROCYTE [DISTWIDTH] IN BLOOD BY AUTOMATED COUNT: 12.8 % (ref 11.5–15)
GFR, ESTIMATED: 50 ML/MIN/1.73M2
GLUCOSE SERPL-MCNC: 97 MG/DL (ref 74–99)
HCT VFR BLD AUTO: 33.6 % (ref 34–48)
HGB BLD-MCNC: 10.7 G/DL (ref 11.5–15.5)
IMM GRANULOCYTES # BLD AUTO: 0.08 K/UL (ref 0–0.58)
IMM GRANULOCYTES NFR BLD: 1 % (ref 0–5)
LYMPHOCYTES NFR BLD: 0.72 K/UL (ref 1.5–4)
LYMPHOCYTES RELATIVE PERCENT: 12 % (ref 20–42)
MCH RBC QN AUTO: 31.8 PG (ref 26–35)
MCHC RBC AUTO-ENTMCNC: 31.8 G/DL (ref 32–34.5)
MCV RBC AUTO: 99.7 FL (ref 80–99.9)
MONOCYTES NFR BLD: 0.56 K/UL (ref 0.1–0.95)
MONOCYTES NFR BLD: 10 % (ref 2–12)
NEUTROPHILS NFR BLD: 74 % (ref 43–80)
NEUTS SEG NFR BLD: 4.33 K/UL (ref 1.8–7.3)
PLATELET, FLUORESCENCE: 107 K/UL (ref 130–450)
PMV BLD AUTO: 11.8 FL (ref 7–12)
POTASSIUM SERPL-SCNC: 3.9 MMOL/L (ref 3.5–5)
PROT SERPL-MCNC: 4.7 G/DL (ref 6.4–8.3)
RBC # BLD AUTO: 3.37 M/UL (ref 3.5–5.5)
SODIUM SERPL-SCNC: 138 MMOL/L (ref 132–146)
WBC OTHER # BLD: 5.9 K/UL (ref 4.5–11.5)

## 2025-02-28 PROCEDURE — 85025 COMPLETE CBC W/AUTO DIFF WBC: CPT

## 2025-02-28 PROCEDURE — 6360000002 HC RX W HCPCS: Performed by: NURSE PRACTITIONER

## 2025-02-28 PROCEDURE — 36415 COLL VENOUS BLD VENIPUNCTURE: CPT

## 2025-02-28 PROCEDURE — 93242 EXT ECG>48HR<7D RECORDING: CPT

## 2025-02-28 PROCEDURE — 80053 COMPREHEN METABOLIC PANEL: CPT

## 2025-02-28 RX ADMIN — ENOXAPARIN SODIUM 30 MG: 100 INJECTION SUBCUTANEOUS at 08:26

## 2025-02-28 NOTE — PROGRESS NOTES
BRIEF EP PROGRESS NOTE    2* type I AV block on telemetry.    Patient to be discharged with event monitor.    Long Talley DO  [facility] Cardiac Electrophysiology  [facility] [place]  [facility]

## 2025-03-12 ENCOUNTER — OFFICE VISIT (OUTPATIENT)
Dept: ORTHOPEDIC SURGERY | Age: 80
End: 2025-03-12

## 2025-03-12 VITALS
OXYGEN SATURATION: 98 % | DIASTOLIC BLOOD PRESSURE: 73 MMHG | TEMPERATURE: 98 F | SYSTOLIC BLOOD PRESSURE: 163 MMHG | HEART RATE: 80 BPM

## 2025-03-12 DIAGNOSIS — S72.002A CLOSED FRACTURE OF LEFT HIP, INITIAL ENCOUNTER (HCC): Primary | ICD-10-CM

## 2025-03-12 NOTE — PROGRESS NOTES
Follow Up Post Operative Visit     Surgery: Left hip hemiarthroplasty  Date: 2/23/2025    Subjective:    Julita Madrid is here for follow up visit s/p above procedure.  She is doing well.  She is at home taking aspirin for DVT prophylaxis.  She is ambulating with a walker with minimal pain although she does complain of some groin pain with ambulation.  She has had some serous drainage from her bandage which is starting to slow down and significantly decreased.  No signs of infection    Controlled Substances Monitoring:        Physical Exam:    BP: (!) 163/73    General: Alert and oriented x3, no acute distress  Cardiovascular/pulmonary: No labored breathing, peripheral perfusion intact  Musculoskeletal:    Left hip: Staples removed: Mild serous drainage noted but scant.  No palpable abscess or hematoma.  Thigh soft compressible.  Tolerates hip and knee range of motion without restriction.  Seated in wheelchair for exam.      Imaging: AP pelvis and 2 views of the left hip demonstrate a well-placed hemiarthroplasty.  There is continued noted pubic root fracture on the left side which is unchanged in alignment since images from the hospital.  Images independently interpreted by myself    Assessment and Plan: 2-week status post left hip hemiarthroplasty    Continue weightbearing as tolerated with anterolateral precautions.  Finish 2 additional weeks of aspirin.  We talked about incisional care and they were instructed to call if drainage increases or does not stop.  I explained the signs of infection in detail.  I also explained this could be a 6-month 1 year recovery.  We will see her back in 4 weeks to repeat images to ensure that her pubic root fracture on the left is healing appropriately.            Jesús Schroeder DO   Orthopaedic Surgery   3/12/25  3:41 PM    Note: This report was completed using [employer] voiced recognition software.  Every effort has been made to ensure accuracy; however, inadvertent

## 2025-03-15 ENCOUNTER — APPOINTMENT (OUTPATIENT)
Dept: GENERAL RADIOLOGY | Age: 80
DRG: 242 | End: 2025-03-15
Payer: MEDICARE

## 2025-03-15 ENCOUNTER — HOSPITAL ENCOUNTER (INPATIENT)
Age: 80
LOS: 3 days | Discharge: HOME | DRG: 242 | End: 2025-03-18
Attending: EMERGENCY MEDICINE | Admitting: INTERNAL MEDICINE
Payer: MEDICARE

## 2025-03-15 DIAGNOSIS — I50.33 ACUTE ON CHRONIC HEART FAILURE WITH PRESERVED EJECTION FRACTION (HCC): ICD-10-CM

## 2025-03-15 DIAGNOSIS — R00.1 BRADYCARDIA: ICD-10-CM

## 2025-03-15 DIAGNOSIS — I45.9: Primary | ICD-10-CM

## 2025-03-15 DIAGNOSIS — I50.23 ACUTE ON CHRONIC SYSTOLIC CHF (CONGESTIVE HEART FAILURE) (HCC): ICD-10-CM

## 2025-03-15 DIAGNOSIS — R06.02 SHORTNESS OF BREATH: ICD-10-CM

## 2025-03-15 DIAGNOSIS — Z98.890 POST-OPERATIVE STATE: ICD-10-CM

## 2025-03-15 PROBLEM — I44.2 COMPLETE HEART BLOCK (HCC): Status: ACTIVE | Noted: 2025-03-15

## 2025-03-15 LAB
ABO + RH BLD: NORMAL
ALBUMIN SERPL-MCNC: 3 G/DL (ref 3.5–5.2)
ALP SERPL-CCNC: 239 U/L (ref 35–104)
ALT SERPL-CCNC: 8 U/L (ref 0–32)
ANION GAP SERPL CALCULATED.3IONS-SCNC: 14 MMOL/L (ref 7–16)
ARM BAND NUMBER: NORMAL
AST SERPL-CCNC: 23 U/L (ref 0–31)
BASOPHILS # BLD: 0.06 K/UL (ref 0–0.2)
BASOPHILS NFR BLD: 1 % (ref 0–2)
BILIRUB SERPL-MCNC: 0.5 MG/DL (ref 0–1.2)
BLOOD BANK SAMPLE EXPIRATION: NORMAL
BLOOD GROUP ANTIBODIES SERPL: NEGATIVE
BNP SERPL-MCNC: ABNORMAL PG/ML (ref 0–450)
BUN SERPL-MCNC: 20 MG/DL (ref 6–23)
CALCIUM SERPL-MCNC: 8.3 MG/DL (ref 8.6–10.2)
CHLORIDE SERPL-SCNC: 108 MMOL/L (ref 98–107)
CO2 SERPL-SCNC: 19 MMOL/L (ref 22–29)
CREAT SERPL-MCNC: 1.2 MG/DL (ref 0.5–1)
EOSINOPHIL # BLD: 0.21 K/UL (ref 0.05–0.5)
EOSINOPHILS RELATIVE PERCENT: 5 % (ref 0–6)
ERYTHROCYTE [DISTWIDTH] IN BLOOD BY AUTOMATED COUNT: 13.7 % (ref 11.5–15)
GFR, ESTIMATED: 45 ML/MIN/1.73M2
GLUCOSE SERPL-MCNC: 159 MG/DL (ref 74–99)
HCT VFR BLD AUTO: 34.4 % (ref 34–48)
HGB BLD-MCNC: 10.6 G/DL (ref 11.5–15.5)
IMM GRANULOCYTES # BLD AUTO: <0.03 K/UL (ref 0–0.58)
IMM GRANULOCYTES NFR BLD: 0 % (ref 0–5)
INR PPP: 1.1
LYMPHOCYTES NFR BLD: 0.75 K/UL (ref 1.5–4)
LYMPHOCYTES RELATIVE PERCENT: 17 % (ref 20–42)
MAGNESIUM SERPL-MCNC: 2.2 MG/DL (ref 1.6–2.6)
MCH RBC QN AUTO: 31.8 PG (ref 26–35)
MCHC RBC AUTO-ENTMCNC: 30.8 G/DL (ref 32–34.5)
MCV RBC AUTO: 103.3 FL (ref 80–99.9)
MONOCYTES NFR BLD: 0.32 K/UL (ref 0.1–0.95)
MONOCYTES NFR BLD: 7 % (ref 2–12)
NEUTROPHILS NFR BLD: 70 % (ref 43–80)
NEUTS SEG NFR BLD: 3.09 K/UL (ref 1.8–7.3)
PARTIAL THROMBOPLASTIN TIME: 32.9 SEC (ref 24.5–35.1)
PLATELET # BLD AUTO: 164 K/UL (ref 130–450)
PMV BLD AUTO: 10.4 FL (ref 7–12)
POTASSIUM SERPL-SCNC: 3.8 MMOL/L (ref 3.5–5)
PROT SERPL-MCNC: 5.4 G/DL (ref 6.4–8.3)
PROTHROMBIN TIME: 11.7 SEC (ref 9.3–12.4)
RBC # BLD AUTO: 3.33 M/UL (ref 3.5–5.5)
SODIUM SERPL-SCNC: 141 MMOL/L (ref 132–146)
TROPONIN I SERPL HS-MCNC: 59 NG/L (ref 0–9)
TROPONIN I SERPL HS-MCNC: 61 NG/L (ref 0–9)
WBC OTHER # BLD: 4.5 K/UL (ref 4.5–11.5)

## 2025-03-15 PROCEDURE — 2500000003 HC RX 250 WO HCPCS

## 2025-03-15 PROCEDURE — 86850 RBC ANTIBODY SCREEN: CPT

## 2025-03-15 PROCEDURE — 85025 COMPLETE CBC W/AUTO DIFF WBC: CPT

## 2025-03-15 PROCEDURE — 99285 EMERGENCY DEPT VISIT HI MDM: CPT

## 2025-03-15 PROCEDURE — 86900 BLOOD TYPING SEROLOGIC ABO: CPT

## 2025-03-15 PROCEDURE — 85610 PROTHROMBIN TIME: CPT

## 2025-03-15 PROCEDURE — 84484 ASSAY OF TROPONIN QUANT: CPT

## 2025-03-15 PROCEDURE — 86901 BLOOD TYPING SEROLOGIC RH(D): CPT

## 2025-03-15 PROCEDURE — 83735 ASSAY OF MAGNESIUM: CPT

## 2025-03-15 PROCEDURE — 71045 X-RAY EXAM CHEST 1 VIEW: CPT

## 2025-03-15 PROCEDURE — 6360000002 HC RX W HCPCS: Performed by: EMERGENCY MEDICINE

## 2025-03-15 PROCEDURE — 2140000000 HC CCU INTERMEDIATE R&B

## 2025-03-15 PROCEDURE — 93005 ELECTROCARDIOGRAM TRACING: CPT | Performed by: EMERGENCY MEDICINE

## 2025-03-15 PROCEDURE — 6370000000 HC RX 637 (ALT 250 FOR IP)

## 2025-03-15 PROCEDURE — 6360000002 HC RX W HCPCS

## 2025-03-15 PROCEDURE — 85730 THROMBOPLASTIN TIME PARTIAL: CPT

## 2025-03-15 PROCEDURE — 83880 ASSAY OF NATRIURETIC PEPTIDE: CPT

## 2025-03-15 PROCEDURE — 80053 COMPREHEN METABOLIC PANEL: CPT

## 2025-03-15 RX ORDER — POTASSIUM CHLORIDE 7.45 MG/ML
10 INJECTION INTRAVENOUS PRN
Status: DISCONTINUED | OUTPATIENT
Start: 2025-03-15 | End: 2025-03-16

## 2025-03-15 RX ORDER — VALSARTAN 80 MG/1
80 TABLET ORAL DAILY
Status: DISCONTINUED | OUTPATIENT
Start: 2025-03-16 | End: 2025-03-18 | Stop reason: HOSPADM

## 2025-03-15 RX ORDER — ACETAMINOPHEN 650 MG/1
650 SUPPOSITORY RECTAL EVERY 6 HOURS PRN
Status: DISCONTINUED | OUTPATIENT
Start: 2025-03-15 | End: 2025-03-18 | Stop reason: HOSPADM

## 2025-03-15 RX ORDER — POTASSIUM CHLORIDE 1500 MG/1
40 TABLET, EXTENDED RELEASE ORAL PRN
Status: DISCONTINUED | OUTPATIENT
Start: 2025-03-15 | End: 2025-03-16

## 2025-03-15 RX ORDER — BUMETANIDE 0.25 MG/ML
1 INJECTION, SOLUTION INTRAMUSCULAR; INTRAVENOUS ONCE
Status: COMPLETED | OUTPATIENT
Start: 2025-03-15 | End: 2025-03-15

## 2025-03-15 RX ORDER — ONDANSETRON 2 MG/ML
4 INJECTION INTRAMUSCULAR; INTRAVENOUS EVERY 6 HOURS PRN
Status: DISCONTINUED | OUTPATIENT
Start: 2025-03-15 | End: 2025-03-17

## 2025-03-15 RX ORDER — MAGNESIUM SULFATE IN WATER 40 MG/ML
2000 INJECTION, SOLUTION INTRAVENOUS PRN
Status: DISCONTINUED | OUTPATIENT
Start: 2025-03-15 | End: 2025-03-16

## 2025-03-15 RX ORDER — ASPIRIN 81 MG/1
81 TABLET ORAL 2 TIMES DAILY
Status: DISCONTINUED | OUTPATIENT
Start: 2025-03-15 | End: 2025-03-18 | Stop reason: HOSPADM

## 2025-03-15 RX ORDER — ENOXAPARIN SODIUM 100 MG/ML
40 INJECTION SUBCUTANEOUS DAILY
Status: DISCONTINUED | OUTPATIENT
Start: 2025-03-15 | End: 2025-03-18 | Stop reason: HOSPADM

## 2025-03-15 RX ORDER — SODIUM CHLORIDE 0.9 % (FLUSH) 0.9 %
5-40 SYRINGE (ML) INJECTION EVERY 12 HOURS SCHEDULED
Status: DISCONTINUED | OUTPATIENT
Start: 2025-03-15 | End: 2025-03-18 | Stop reason: HOSPADM

## 2025-03-15 RX ORDER — SODIUM CHLORIDE 0.9 % (FLUSH) 0.9 %
10 SYRINGE (ML) INJECTION PRN
Status: DISCONTINUED | OUTPATIENT
Start: 2025-03-15 | End: 2025-03-18 | Stop reason: HOSPADM

## 2025-03-15 RX ORDER — POLYETHYLENE GLYCOL 3350 17 G/17G
17 POWDER, FOR SOLUTION ORAL DAILY PRN
Status: DISCONTINUED | OUTPATIENT
Start: 2025-03-15 | End: 2025-03-18 | Stop reason: HOSPADM

## 2025-03-15 RX ORDER — ONDANSETRON 4 MG/1
4 TABLET, ORALLY DISINTEGRATING ORAL EVERY 8 HOURS PRN
Status: DISCONTINUED | OUTPATIENT
Start: 2025-03-15 | End: 2025-03-17

## 2025-03-15 RX ORDER — SODIUM CHLORIDE 9 MG/ML
INJECTION, SOLUTION INTRAVENOUS PRN
Status: DISCONTINUED | OUTPATIENT
Start: 2025-03-15 | End: 2025-03-18 | Stop reason: HOSPADM

## 2025-03-15 RX ORDER — ACETAMINOPHEN 325 MG/1
650 TABLET ORAL EVERY 6 HOURS PRN
Status: DISCONTINUED | OUTPATIENT
Start: 2025-03-15 | End: 2025-03-18 | Stop reason: HOSPADM

## 2025-03-15 RX ADMIN — SODIUM CHLORIDE, PRESERVATIVE FREE 10 ML: 5 INJECTION INTRAVENOUS at 23:18

## 2025-03-15 RX ADMIN — ENOXAPARIN SODIUM 40 MG: 100 INJECTION SUBCUTANEOUS at 23:17

## 2025-03-15 RX ADMIN — BUMETANIDE 1 MG: 0.25 INJECTION INTRAMUSCULAR; INTRAVENOUS at 15:06

## 2025-03-15 RX ADMIN — ASPIRIN 81 MG: 81 TABLET, COATED ORAL at 23:18

## 2025-03-15 ASSESSMENT — LIFESTYLE VARIABLES
HOW OFTEN DO YOU HAVE A DRINK CONTAINING ALCOHOL: NEVER
HOW MANY STANDARD DRINKS CONTAINING ALCOHOL DO YOU HAVE ON A TYPICAL DAY: PATIENT DOES NOT DRINK

## 2025-03-15 ASSESSMENT — PAIN - FUNCTIONAL ASSESSMENT: PAIN_FUNCTIONAL_ASSESSMENT: NONE - DENIES PAIN

## 2025-03-15 NOTE — ED PROVIDER NOTES
[facility] EMERGENCY DEPARTMENT  EMERGENCY DEPARTMENT ENCOUNTER        Pt Name: Julita Madrid  MRN: [id number]  Birthdate 1945  Date of evaluation: 3/15/2025  Provider: Case Wang DO  PCP: Anjel Duran DO  Note Started: 1:24 PM EDT 3/15/25    CHIEF COMPLAINT       Chief Complaint   Patient presents with    Bradycardia     Sent by EP for low HR on zio patch and pacemaker to be placed. HR 35-40 in triage. Recent L hip replacement.         HISTORY OF PRESENT ILLNESS: 1 or more Elements   History From: Patient, EMR     Limitations to history : None    Julita Madrid is a 79 y.o. female who presents after being sent in by EP for abnormal outpatient Zio patch monitor.  History significant for recent hip surgery, she states she has been doing well, she has known history of Mobitz block that has been followed outpatient, she was called by her electrophysiologist and told to come in.  She denies chest pain shortness of breath near syncope nausea vomiting does states she feels little bit more tired than normal.    Nursing Notes were all reviewed and agreed with or any disagreements were addressed in the HPI.      REVIEW OF EXTERNAL NOTE :       Electrophysiology note from 2/27/2025, patient was admitted on 2/21/2025 she is noted to have type II 1 AV block on telemetry.    REVIEW OF SYSTEMS :           Positives and Pertinent negatives as per HPI.     SURGICAL HISTORY     Past Surgical History:   Procedure Laterality Date    CHOLECYSTECTOMY      COLONOSCOPY      ENDOSCOPY, COLON, DIAGNOSTIC      ERCP  01/06/2015    ERCP  2/17/2015    with stent removal    HIP SURGERY Left 2/23/2025    LEFT HIP HEMIARTHROPLASTY performed by Jesús Schroeder DO at [facility] OR    UPPER GASTROINTESTINAL ENDOSCOPY  08/23/2017    Dr CASIMIRO Ward       CURRENTMEDICATIONS       Previous Medications    ASPIRIN 81 MG EC TABLET    Take 1 tablet by mouth in the morning and at bedtime    VALSARTAN (DIOVAN)

## 2025-03-16 ENCOUNTER — APPOINTMENT (OUTPATIENT)
Age: 80
DRG: 242 | End: 2025-03-16
Attending: INTERNAL MEDICINE
Payer: MEDICARE

## 2025-03-16 PROBLEM — I50.23 ACUTE ON CHRONIC SYSTOLIC HEART FAILURE (HCC): Status: ACTIVE | Noted: 2025-03-16

## 2025-03-16 PROBLEM — I45.9: Status: ACTIVE | Noted: 2025-03-16

## 2025-03-16 LAB
ALBUMIN SERPL-MCNC: 3 G/DL (ref 3.5–5.2)
ALP SERPL-CCNC: 222 U/L (ref 35–104)
ALT SERPL-CCNC: 7 U/L (ref 0–32)
ANION GAP SERPL CALCULATED.3IONS-SCNC: 14 MMOL/L (ref 7–16)
AST SERPL-CCNC: 28 U/L (ref 0–31)
BASOPHILS # BLD: 0.06 K/UL (ref 0–0.2)
BASOPHILS NFR BLD: 1 % (ref 0–2)
BILIRUB SERPL-MCNC: 0.6 MG/DL (ref 0–1.2)
BUN SERPL-MCNC: 17 MG/DL (ref 6–23)
CALCIUM SERPL-MCNC: 8.2 MG/DL (ref 8.6–10.2)
CHLORIDE SERPL-SCNC: 108 MMOL/L (ref 98–107)
CHOLEST SERPL-MCNC: 143 MG/DL
CO2 SERPL-SCNC: 21 MMOL/L (ref 22–29)
CREAT SERPL-MCNC: 1.3 MG/DL (ref 0.5–1)
ECHO AO ASC DIAM: 2.4 CM
ECHO AV AREA PEAK VELOCITY: 2.1 CM2
ECHO AV AREA VTI: 2.6 CM2
ECHO AV CUSP MM: 1.4 CM
ECHO AV MEAN GRADIENT: 12 MMHG
ECHO AV MEAN VELOCITY: 1.7 M/S
ECHO AV PEAK GRADIENT: 22 MMHG
ECHO AV PEAK VELOCITY: 2.3 M/S
ECHO AV VELOCITY RATIO: 0.74
ECHO AV VTI: 57.2 CM
ECHO EST RA PRESSURE: 3 MMHG
ECHO LA DIAMETER: 4.1 CM
ECHO LA VOL A-L A2C: 95 ML (ref 22–52)
ECHO LA VOL A-L A4C: 80 ML (ref 22–52)
ECHO LA VOL BP: 83 ML (ref 22–52)
ECHO LA VOL MOD A2C: 87 ML (ref 22–52)
ECHO LA VOL MOD A4C: 73 ML (ref 22–52)
ECHO LA VOLUME AREA LENGTH: 91 ML
ECHO LV EF PHYSICIAN: 55 %
ECHO LV FRACTIONAL SHORTENING: 36 % (ref 28–44)
ECHO LV INTERNAL DIMENSION DIASTOLIC: 5 CM (ref 3.9–5.3)
ECHO LV INTERNAL DIMENSION SYSTOLIC: 3.2 CM
ECHO LV ISOVOLUMETRIC RELAXATION TIME (IVRT): 83.7 MS
ECHO LV IVSD: 0.9 CM (ref 0.6–0.9)
ECHO LV MASS 2D: 169.9 G (ref 67–162)
ECHO LV POSTERIOR WALL DIASTOLIC: 1 CM (ref 0.6–0.9)
ECHO LV RELATIVE WALL THICKNESS RATIO: 0.4
ECHO LVOT AREA: 2.8 CM2
ECHO LVOT AV VTI INDEX: 0.89
ECHO LVOT DIAM: 1.9 CM
ECHO LVOT MEAN GRADIENT: 7 MMHG
ECHO LVOT PEAK GRADIENT: 12 MMHG
ECHO LVOT PEAK VELOCITY: 1.7 M/S
ECHO LVOT SV: 144.2 ML
ECHO LVOT VTI: 50.9 CM
ECHO MV "A" WAVE DURATION: 125.6 MSEC
ECHO MV A VELOCITY: 1.02 M/S
ECHO MV AREA PHT: 4.3 CM2
ECHO MV AREA VTI: 5.6 CM2
ECHO MV E DECELERATION TIME (DT): 131.3 MS
ECHO MV E VELOCITY: 0.7 M/S
ECHO MV E/A RATIO: 0.69
ECHO MV EROA PISA: 0.1 CM2
ECHO MV LVOT VTI INDEX: 0.51
ECHO MV MAX VELOCITY: 1.2 M/S
ECHO MV MEAN GRADIENT: 3 MMHG
ECHO MV MEAN VELOCITY: 0.8 M/S
ECHO MV PEAK GRADIENT: 6 MMHG
ECHO MV PRESSURE HALF TIME (PHT): 51.4 MS
ECHO MV REGURGITANT ALIASING (NYQUIST) VELOCITY: 30 CM/S
ECHO MV REGURGITANT RADIUS PISA: 0.56 CM
ECHO MV REGURGITANT VELOCITY PISA: 5.1 M/S
ECHO MV REGURGITANT VOLUME PISA: 23.12 ML
ECHO MV REGURGITANT VTIA: 199.6 CM
ECHO MV VTI: 25.9 CM
ECHO PV MAX VELOCITY: 0.9 M/S
ECHO PV MEAN GRADIENT: 2 MMHG
ECHO PV MEAN VELOCITY: 0.7 M/S
ECHO PV PEAK GRADIENT: 3 MMHG
ECHO PV VTI: 21.4 CM
ECHO RIGHT VENTRICULAR SYSTOLIC PRESSURE (RVSP): 53 MMHG
ECHO RV INTERNAL DIMENSION: 2.2 CM
ECHO RV LONGITUDINAL DIMENSION: 6.4 CM
ECHO RV MID DIMENSION: 3.7 CM
ECHO TV REGURGITANT MAX VELOCITY: 3.54 M/S
ECHO TV REGURGITANT PEAK GRADIENT: 50 MMHG
EOSINOPHIL # BLD: 0.25 K/UL (ref 0.05–0.5)
EOSINOPHILS RELATIVE PERCENT: 5 % (ref 0–6)
ERYTHROCYTE [DISTWIDTH] IN BLOOD BY AUTOMATED COUNT: 13.3 % (ref 11.5–15)
GFR, ESTIMATED: 43 ML/MIN/1.73M2
GLUCOSE SERPL-MCNC: 97 MG/DL (ref 74–99)
HBA1C MFR BLD: 4.8 % (ref 4–5.6)
HCT VFR BLD AUTO: 32 % (ref 34–48)
HDLC SERPL-MCNC: 34 MG/DL
HGB BLD-MCNC: 10.1 G/DL (ref 11.5–15.5)
IMM GRANULOCYTES # BLD AUTO: <0.03 K/UL (ref 0–0.58)
IMM GRANULOCYTES NFR BLD: 0 % (ref 0–5)
LDLC SERPL CALC-MCNC: 88 MG/DL
LEFT VENTRICULAR EJECTION FRACTION HIGH VALUE: 55 %
LEFT VENTRICULAR EJECTION FRACTION MODE: NORMAL
LV EF: 50 %
LYMPHOCYTES NFR BLD: 0.85 K/UL (ref 1.5–4)
LYMPHOCYTES RELATIVE PERCENT: 18 % (ref 20–42)
MCH RBC QN AUTO: 31.6 PG (ref 26–35)
MCHC RBC AUTO-ENTMCNC: 31.6 G/DL (ref 32–34.5)
MCV RBC AUTO: 100 FL (ref 80–99.9)
MONOCYTES NFR BLD: 0.42 K/UL (ref 0.1–0.95)
MONOCYTES NFR BLD: 9 % (ref 2–12)
NEUTROPHILS NFR BLD: 67 % (ref 43–80)
NEUTS SEG NFR BLD: 3.21 K/UL (ref 1.8–7.3)
PLATELET # BLD AUTO: 166 K/UL (ref 130–450)
PMV BLD AUTO: 10.5 FL (ref 7–12)
POTASSIUM SERPL-SCNC: 4.1 MMOL/L (ref 3.5–5)
PROT SERPL-MCNC: 5.4 G/DL (ref 6.4–8.3)
RBC # BLD AUTO: 3.2 M/UL (ref 3.5–5.5)
SODIUM SERPL-SCNC: 143 MMOL/L (ref 132–146)
T4 FREE SERPL-MCNC: 1.4 NG/DL (ref 0.9–1.7)
TRIGL SERPL-MCNC: 106 MG/DL
TROPONIN I SERPL HS-MCNC: 64 NG/L (ref 0–9)
TSH SERPL DL<=0.05 MIU/L-ACNC: 0.95 UIU/ML (ref 0.27–4.2)
VLDLC SERPL CALC-MCNC: 21 MG/DL
WBC OTHER # BLD: 4.8 K/UL (ref 4.5–11.5)

## 2025-03-16 PROCEDURE — 83036 HEMOGLOBIN GLYCOSYLATED A1C: CPT

## 2025-03-16 PROCEDURE — 36415 COLL VENOUS BLD VENIPUNCTURE: CPT

## 2025-03-16 PROCEDURE — 93306 TTE W/DOPPLER COMPLETE: CPT

## 2025-03-16 PROCEDURE — 85025 COMPLETE CBC W/AUTO DIFF WBC: CPT

## 2025-03-16 PROCEDURE — 84484 ASSAY OF TROPONIN QUANT: CPT

## 2025-03-16 PROCEDURE — 93005 ELECTROCARDIOGRAM TRACING: CPT | Performed by: NURSE PRACTITIONER

## 2025-03-16 PROCEDURE — 93306 TTE W/DOPPLER COMPLETE: CPT | Performed by: INTERNAL MEDICINE

## 2025-03-16 PROCEDURE — 80061 LIPID PANEL: CPT

## 2025-03-16 PROCEDURE — 84443 ASSAY THYROID STIM HORMONE: CPT

## 2025-03-16 PROCEDURE — 80053 COMPREHEN METABOLIC PANEL: CPT

## 2025-03-16 PROCEDURE — 99223 1ST HOSP IP/OBS HIGH 75: CPT | Performed by: INTERNAL MEDICINE

## 2025-03-16 PROCEDURE — 2140000000 HC CCU INTERMEDIATE R&B

## 2025-03-16 PROCEDURE — 84439 ASSAY OF FREE THYROXINE: CPT

## 2025-03-16 PROCEDURE — 6370000000 HC RX 637 (ALT 250 FOR IP)

## 2025-03-16 PROCEDURE — 99222 1ST HOSP IP/OBS MODERATE 55: CPT | Performed by: INTERNAL MEDICINE

## 2025-03-16 PROCEDURE — 6360000002 HC RX W HCPCS: Performed by: INTERNAL MEDICINE

## 2025-03-16 PROCEDURE — 2500000003 HC RX 250 WO HCPCS

## 2025-03-16 RX ORDER — BUMETANIDE 0.25 MG/ML
1 INJECTION, SOLUTION INTRAMUSCULAR; INTRAVENOUS DAILY
Status: DISCONTINUED | OUTPATIENT
Start: 2025-03-16 | End: 2025-03-18 | Stop reason: HOSPADM

## 2025-03-16 RX ADMIN — BUMETANIDE 1 MG: 0.25 INJECTION INTRAMUSCULAR; INTRAVENOUS at 10:23

## 2025-03-16 RX ADMIN — ACETAMINOPHEN 650 MG: 325 TABLET ORAL at 23:14

## 2025-03-16 RX ADMIN — ASPIRIN 81 MG: 81 TABLET, COATED ORAL at 10:23

## 2025-03-16 RX ADMIN — ASPIRIN 81 MG: 81 TABLET, COATED ORAL at 20:49

## 2025-03-16 RX ADMIN — SODIUM CHLORIDE, PRESERVATIVE FREE 10 ML: 5 INJECTION INTRAVENOUS at 10:24

## 2025-03-16 RX ADMIN — VALSARTAN 80 MG: 80 TABLET, FILM COATED ORAL at 10:23

## 2025-03-16 NOTE — PROGRESS NOTES
4 Eyes Skin Assessment     NAME:  Julita Madrid  YOB: 1945  MEDICAL RECORD NUMBER:  [id number]    The patient is being assessed for  Admission    I agree that at least one RN has performed a thorough Head to Toe Skin Assessment on the patient. ALL assessment sites listed below have been assessed.      Areas assessed by both nurses:    Head, Face, Ears, Shoulders, Back, Chest, Arms, Elbows, Hands, Sacrum. Buttock, Coccyx, Ischium, and Legs. Feet and Heels        Does the Patient have a Wound? Yes wound(s) were present on assessment. LDA wound assessment was Initiated and completed by RN       Preston Prevention initiated by RN: Yes  Wound Care Orders initiated by RN: Yes    Pressure Injury (Stage 3,4, Unstageable, DTI, NWPT, and Complex wounds) if present, place Wound referral order by RN under : No    New Ostomies, if present place, Ostomy referral order under : No     Nurse 1 eSignature: Electronically signed by Annie Borges RN on 3/16/25 at 1:36 AM EDT    **SHARE this note so that the co-signing nurse can place an eSignature**    Nurse 2 eSignature: Electronically signed by Maricarmen Garcia RN on 3/16/25 at 7:53 PM EDT

## 2025-03-16 NOTE — CONSULTS
[facility]YS[facility]- [facility] [place] [place] Electrophysiology  Consultation Report  PATIENT: Julita Madrid  MEDICAL RECORD NUMBER: [id number]  DATE OF SERVICE:  3/16/2025  ATTENDING ELECTROPHYSIOLOGIST: Nighat Beauchamp MD  PRIMARY ELECTROPHYSIOLOGIST: Nighat Beauchamp MD  REFERRING PHYSICIAN: No ref. provider found and Anjel uDran DO  CHIEF COMPLAINT: Fatigue, AV block    HPI: This is a 79 y.o. female with a history of hypertension, esophageal stricture treated with balloon dilatation and followed by Dr. Cardenas from cardiology.  She is only on valsartan, baby aspirin at home.  Her son and daughter are at the bedside today and a lot of the history is obtained from them.  She has been seen by [facility] during her last hospitalization after a fall at home that resulted in left hip fracture.  However she has been getting progressively weaker over the past year and decreasing her activity.  She spends a lot of time in bed but does walk around her house to go to the bathroom or get her food.  After a fall in February 2025, she underwent surgery for hip fracture.  Postoperatively she was noted to be bradycardic and was sent home with a Zio monitor.  This was reviewed last week and revealed multiple episodes of complete heart block during the day and at night as well as long pauses lasting up to 5.5 seconds at night.  No further episodes of syncope or near syncope however.  The patient denies any cardiac symptoms including those of chest discomfort or dyspnea but her activity is limited.  After her event monitor was interpreted I spoke to the patient's son on Friday and suggested urgent hospitalization for intervention as might be needed.    Cardiac electrophysiology service is consulted for second and third-degree AV block and pauses lasting up to 5.5 seconds on event monitoring..    Patient Active Problem List    Diagnosis Date Noted    Second degree atrioventricular block, Mobitz (type) I 10/25/2022

## 2025-03-16 NOTE — H&P
Hospital Medicine History & Physical      PCP: Anjel Duran DO    Date of Admission: 3/15/2025    Date of Service: .MAR. 16, 2025    Chief Complaint:   ABNORMAL ZIO       History Of Present Illness:     79 y.o. female presented with ABNORMAL ZIO.  DR AVILA, CALLED THE SON TO BRING HIS MOTHER IN DUE TO BRADYCARDIA ON THE ZIO MONITOR.  THE PATIENT RECENTLY HAD A LEFT KATHLEEN, AD HAS BEEN TIRED AND WEAK.  NO CHEST PAIN     Past Medical History:          Diagnosis Date    Bradycardia 3/6/2017    Common bile duct dilation     GERD (gastroesophageal reflux disease)     Hypertension     Weight loss, unintentional        Past Surgical History:          Procedure Laterality Date    CHOLECYSTECTOMY      COLONOSCOPY      ENDOSCOPY, COLON, DIAGNOSTIC      ERCP  01/06/2015    ERCP  2/17/2015    with stent removal    HIP SURGERY Left 2/23/2025    LEFT HIP HEMIARTHROPLASTY performed by Jesús Schroeder DO at [facility] OR    UPPER GASTROINTESTINAL ENDOSCOPY  08/23/2017    Dr CASIMIRO Ward       Medications Prior to Admission:      Prior to Admission medications    Medication Sig Start Date End Date Taking? Authorizing Provider   valsartan (DIOVAN) 160 MG tablet Take 0.5 tablets by mouth daily 2/27/25  Yes Eduarda Barajas MD   aspirin 81 MG EC tablet Take 1 tablet by mouth in the morning and at bedtime 2/23/25  Yes Burce Peñaloza DO       Allergies:  Altace [ramipril], Amlodipine, Covera-hs [verapamil], Hctz [hydrochlorothiazide], and Influenza vaccines    Social History:      The patient currently lives      TOBACCO:   reports that she has never smoked. She has never used smokeless tobacco.  ETOH:   reports that she does not currently use alcohol.      Family History:       Reviewed in detail and negative for DM, CAD, Cancer, CVA. Positive as follows:        Problem Relation Age of Onset    High Blood Pressure  Hospital Problems    Diagnosis Date Noted    Acute on chronic systolic heart failure (HCC) [I50.23] 03/16/2025    Complete heart block (HCC) [I44.2] 03/15/2025    ROBERT (acute kidney injury) [N17.9] 02/22/2025    Chronic renal disease, stage III (HCC) [660209] [N18.30] 01/15/2024    Essential hypertension, benign [I10] 09/08/2014         PLAN:  BUMEX   EP CONSULT  DIOVAN      DVT Prophylaxis: SCD  Diet: Diet NPO  ADULT DIET; Regular; Low Sodium (2 gm)  Diet NPO Exceptions are: Sips of Water with Meds  Code Status: Full Code    PT/OT Eval Status: ORDERED    Dispo -  HOME    Electronically signed by Andrez Kim DO on 3/16/2025 at 12:47 PM FACOI       Thank you Anjel Duran DO for the opportunity to be involved in this patient's care. If you have any questions or concerns please feel free to contact me at [phone]

## 2025-03-16 NOTE — CONSULTS
[facility] consult  Dr. Tj Alvarez      Reason for Consult: Elevated troponin  Requesting Physician: Dr. Andrez Kim  CHIEF COMPLAINT: Weakness, falls  History Obtained From: patient  HISTORY OF PRESENT ILLNESS:   Patient is an 79 years old female with hypertension, has been feeling weak, had abnormal event monitor, patient was directly admitted to the hospital for pacemaker implantation tomorrow, cardiology was consulted for elevated troponin  Patient has been complaining of weakness, easy fatigability, mechanical falls, dizziness, dyspnea on exertion, no chest pain, no pedal edema, no PND, no orthopnea.      Past Medical History:   Diagnosis Date    Bradycardia 3/6/2017    Common bile duct dilation     GERD (gastroesophageal reflux disease)     Hypertension     Weight loss, unintentional        Past Surgical History:   Procedure Laterality Date    CHOLECYSTECTOMY      COLONOSCOPY      ENDOSCOPY, COLON, DIAGNOSTIC      ERCP  01/06/2015    ERCP  2/17/2015    with stent removal    HIP SURGERY Left 2/23/2025    LEFT HIP HEMIARTHROPLASTY performed by Jesús Schroeder DO at [facility]    UPPER GASTROINTESTINAL ENDOSCOPY  08/23/2017    Dr CASIMIRO Ward         Current Facility-Administered Medications:     bumetanide (BUMEX) injection 1 mg, 1 mg, IntraVENous, Daily, Andrez Kim DO, 1 mg at 03/16/25 1023    aspirin EC tablet 81 mg, 81 mg, Oral, BID, Elmer Saucedoyssa ALEKS, APRN - CNP, 81 mg at 03/16/25 1023    valsartan (DIOVAN) tablet 80 mg, 80 mg, Oral, Daily, Elmer Saucedoyssa ALESK, APRN - CNP, 80 mg at 03/16/25 1023    sodium chloride flush 0.9 % injection 5-40 mL, 5-40 mL, IntraVENous, 2 times per day, Elmer Saucedoyssa ALEKS, APRN - CNP, 10 mL at 03/16/25 1024    sodium chloride flush 0.9 % injection 10 mL, 10 mL, IntraVENous, PRN, Lewis Kasia ALEKS, APRN - CNP    0.9 % sodium chloride infusion, , IntraVENous, PRN, Elmer Saucedoyssa ALEKS, APRN - CNP    [Held by provider] enoxaparin (LOVENOX) injection 40 mg, 40 mg,  secondary to comorbid conditions and chronic kidney disease  3.  Hypertension: Controlled  4.  Chronic diastolic congestive heart failure: Decompensated, continue diuresis  5.  Bicuspid aortic valve with mild stenosis  6.  Mitral valve regurgitation: Mild to moderate  7.  Tricuspid valve regurgitation: Mild  8.  Pulmonary hypertension: Moderate    RECOMMENDATIONS:   1.  Continue current treatment  2.  Avoid negative chronotropic medications  3.  For pacemaker implant tomorrow  4.  No active general cardiology problems, will see as needed, thank you    I have reviewed my findings and recommendations with patient  Discussed with the nursing staff  Thank you for the consult  Electronically signed by Tj Alvarez MD on 3/16/2025 at 3:00 PM  NOTE: This report was transcribed using voice recognition software. Every effort was made to ensure accuracy; however, inadvertent computerized transcription errors may be present

## 2025-03-17 ENCOUNTER — APPOINTMENT (OUTPATIENT)
Dept: GENERAL RADIOLOGY | Age: 80
DRG: 242 | End: 2025-03-17
Payer: MEDICARE

## 2025-03-17 ENCOUNTER — ANESTHESIA EVENT (OUTPATIENT)
Age: 80
DRG: 242 | End: 2025-03-17
Payer: MEDICARE

## 2025-03-17 ENCOUNTER — ANESTHESIA (OUTPATIENT)
Age: 80
DRG: 242 | End: 2025-03-17
Payer: MEDICARE

## 2025-03-17 PROBLEM — I50.33 ACUTE ON CHRONIC HEART FAILURE WITH PRESERVED EJECTION FRACTION (HCC): Status: ACTIVE | Noted: 2025-03-17

## 2025-03-17 LAB
ANION GAP SERPL CALCULATED.3IONS-SCNC: 13 MMOL/L (ref 7–16)
BASOPHILS # BLD: 0.05 K/UL (ref 0–0.2)
BASOPHILS NFR BLD: 1 % (ref 0–2)
BNP SERPL-MCNC: ABNORMAL PG/ML (ref 0–450)
BUN SERPL-MCNC: 17 MG/DL (ref 6–23)
CALCIUM SERPL-MCNC: 7.9 MG/DL (ref 8.6–10.2)
CHLORIDE SERPL-SCNC: 110 MMOL/L (ref 98–107)
CO2 SERPL-SCNC: 21 MMOL/L (ref 22–29)
CREAT SERPL-MCNC: 1.3 MG/DL (ref 0.5–1)
EKG ATRIAL RATE: 42 BPM
EKG ATRIAL RATE: 71 BPM
EKG P AXIS: 49 DEGREES
EKG P AXIS: 86 DEGREES
EKG P-R INTERVAL: 204 MS
EKG Q-T INTERVAL: 486 MS
EKG Q-T INTERVAL: 490 MS
EKG QRS DURATION: 100 MS
EKG QRS DURATION: 82 MS
EKG QTC CALCULATION (BAZETT): 396 MS
EKG QTC CALCULATION (BAZETT): 409 MS
EKG R AXIS: -23 DEGREES
EKG R AXIS: -46 DEGREES
EKG T AXIS: 3 DEGREES
EKG T AXIS: 68 DEGREES
EKG VENTRICULAR RATE: 40 BPM
EKG VENTRICULAR RATE: 42 BPM
EOSINOPHIL # BLD: 0.32 K/UL (ref 0.05–0.5)
EOSINOPHILS RELATIVE PERCENT: 9 % (ref 0–6)
ERYTHROCYTE [DISTWIDTH] IN BLOOD BY AUTOMATED COUNT: 13.4 % (ref 11.5–15)
GFR, ESTIMATED: 41 ML/MIN/1.73M2
GLUCOSE SERPL-MCNC: 86 MG/DL (ref 74–99)
HCT VFR BLD AUTO: 30.2 % (ref 34–48)
HGB BLD-MCNC: 9.4 G/DL (ref 11.5–15.5)
IMM GRANULOCYTES # BLD AUTO: <0.03 K/UL (ref 0–0.58)
IMM GRANULOCYTES NFR BLD: 0 % (ref 0–5)
LYMPHOCYTES NFR BLD: 0.9 K/UL (ref 1.5–4)
LYMPHOCYTES RELATIVE PERCENT: 25 % (ref 20–42)
MCH RBC QN AUTO: 31.3 PG (ref 26–35)
MCHC RBC AUTO-ENTMCNC: 31.1 G/DL (ref 32–34.5)
MCV RBC AUTO: 100.7 FL (ref 80–99.9)
MONOCYTES NFR BLD: 0.44 K/UL (ref 0.1–0.95)
MONOCYTES NFR BLD: 12 % (ref 2–12)
NEUTROPHILS NFR BLD: 53 % (ref 43–80)
NEUTS SEG NFR BLD: 1.94 K/UL (ref 1.8–7.3)
PLATELET # BLD AUTO: 145 K/UL (ref 130–450)
PMV BLD AUTO: 10 FL (ref 7–12)
POTASSIUM SERPL-SCNC: 3.6 MMOL/L (ref 3.5–5)
RBC # BLD AUTO: 3 M/UL (ref 3.5–5.5)
SODIUM SERPL-SCNC: 144 MMOL/L (ref 132–146)
WBC OTHER # BLD: 3.7 K/UL (ref 4.5–11.5)

## 2025-03-17 PROCEDURE — C1785 PMKR, DUAL, RATE-RESP: HCPCS | Performed by: INTERNAL MEDICINE

## 2025-03-17 PROCEDURE — C1894 INTRO/SHEATH, NON-LASER: HCPCS | Performed by: INTERNAL MEDICINE

## 2025-03-17 PROCEDURE — 6370000000 HC RX 637 (ALT 250 FOR IP): Performed by: INTERNAL MEDICINE

## 2025-03-17 PROCEDURE — 6360000002 HC RX W HCPCS

## 2025-03-17 PROCEDURE — 33208 INSRT HEART PM ATRIAL & VENT: CPT | Performed by: INTERNAL MEDICINE

## 2025-03-17 PROCEDURE — 3700000000 HC ANESTHESIA ATTENDED CARE: Performed by: INTERNAL MEDICINE

## 2025-03-17 PROCEDURE — 7100000010 HC PHASE II RECOVERY - FIRST 15 MIN: Performed by: INTERNAL MEDICINE

## 2025-03-17 PROCEDURE — 6360000002 HC RX W HCPCS: Performed by: INTERNAL MEDICINE

## 2025-03-17 PROCEDURE — 93010 ELECTROCARDIOGRAM REPORT: CPT | Performed by: INTERNAL MEDICINE

## 2025-03-17 PROCEDURE — 7100000011 HC PHASE II RECOVERY - ADDTL 15 MIN: Performed by: INTERNAL MEDICINE

## 2025-03-17 PROCEDURE — C1898 LEAD, PMKR, OTHER THAN TRANS: HCPCS | Performed by: INTERNAL MEDICINE

## 2025-03-17 PROCEDURE — 02H63JZ INSERTION OF PACEMAKER LEAD INTO RIGHT ATRIUM, PERCUTANEOUS APPROACH: ICD-10-PCS | Performed by: INTERNAL MEDICINE

## 2025-03-17 PROCEDURE — 2720000010 HC SURG SUPPLY STERILE: Performed by: INTERNAL MEDICINE

## 2025-03-17 PROCEDURE — 36415 COLL VENOUS BLD VENIPUNCTURE: CPT

## 2025-03-17 PROCEDURE — 2709999900 HC NON-CHARGEABLE SUPPLY: Performed by: INTERNAL MEDICINE

## 2025-03-17 PROCEDURE — 02HK3JZ INSERTION OF PACEMAKER LEAD INTO RIGHT VENTRICLE, PERCUTANEOUS APPROACH: ICD-10-PCS | Performed by: INTERNAL MEDICINE

## 2025-03-17 PROCEDURE — B51N1ZZ FLUOROSCOPY OF LEFT UPPER EXTREMITY VEINS USING LOW OSMOLAR CONTRAST: ICD-10-PCS | Performed by: INTERNAL MEDICINE

## 2025-03-17 PROCEDURE — 71045 X-RAY EXAM CHEST 1 VIEW: CPT

## 2025-03-17 PROCEDURE — 85025 COMPLETE CBC W/AUTO DIFF WBC: CPT

## 2025-03-17 PROCEDURE — 80048 BASIC METABOLIC PNL TOTAL CA: CPT

## 2025-03-17 PROCEDURE — 2500000003 HC RX 250 WO HCPCS: Performed by: INTERNAL MEDICINE

## 2025-03-17 PROCEDURE — 2140000000 HC CCU INTERMEDIATE R&B

## 2025-03-17 PROCEDURE — 3700000001 HC ADD 15 MINUTES (ANESTHESIA): Performed by: INTERNAL MEDICINE

## 2025-03-17 PROCEDURE — C1889 IMPLANT/INSERT DEVICE, NOC: HCPCS | Performed by: INTERNAL MEDICINE

## 2025-03-17 PROCEDURE — C1769 GUIDE WIRE: HCPCS | Performed by: INTERNAL MEDICINE

## 2025-03-17 PROCEDURE — C1892 INTRO/SHEATH,FIXED,PEEL-AWAY: HCPCS | Performed by: INTERNAL MEDICINE

## 2025-03-17 PROCEDURE — 0JH606Z INSERTION OF PACEMAKER, DUAL CHAMBER INTO CHEST SUBCUTANEOUS TISSUE AND FASCIA, OPEN APPROACH: ICD-10-PCS | Performed by: INTERNAL MEDICINE

## 2025-03-17 PROCEDURE — 83880 ASSAY OF NATRIURETIC PEPTIDE: CPT

## 2025-03-17 PROCEDURE — 93005 ELECTROCARDIOGRAM TRACING: CPT | Performed by: INTERNAL MEDICINE

## 2025-03-17 PROCEDURE — 2580000003 HC RX 258

## 2025-03-17 PROCEDURE — 6360000004 HC RX CONTRAST MEDICATION: Performed by: INTERNAL MEDICINE

## 2025-03-17 DEVICE — PACING LEAD
Type: IMPLANTABLE DEVICE | Site: HEART | Status: FUNCTIONAL
Brand: TENDRIL™

## 2025-03-17 DEVICE — DR PACEMAKER DDDR
Type: IMPLANTABLE DEVICE | Site: CHEST  WALL | Status: FUNCTIONAL
Brand: ASSURITY MRI™

## 2025-03-17 DEVICE — ENVELOPE CMRM6122 ABSORB MED MR
Type: IMPLANTABLE DEVICE | Site: CHEST  WALL | Status: FUNCTIONAL
Brand: TYRX™

## 2025-03-17 RX ORDER — IOPAMIDOL 755 MG/ML
INJECTION, SOLUTION INTRAVASCULAR PRN
Status: DISCONTINUED | OUTPATIENT
Start: 2025-03-17 | End: 2025-03-17 | Stop reason: HOSPADM

## 2025-03-17 RX ORDER — POTASSIUM CHLORIDE 1500 MG/1
40 TABLET, EXTENDED RELEASE ORAL ONCE
Status: COMPLETED | OUTPATIENT
Start: 2025-03-17 | End: 2025-03-17

## 2025-03-17 RX ORDER — SODIUM CHLORIDE 0.9 % (FLUSH) 0.9 %
5-40 SYRINGE (ML) INJECTION PRN
Status: DISCONTINUED | OUTPATIENT
Start: 2025-03-17 | End: 2025-03-18 | Stop reason: HOSPADM

## 2025-03-17 RX ORDER — SODIUM CHLORIDE 0.9 % (FLUSH) 0.9 %
5-40 SYRINGE (ML) INJECTION EVERY 12 HOURS SCHEDULED
Status: DISCONTINUED | OUTPATIENT
Start: 2025-03-17 | End: 2025-03-18 | Stop reason: HOSPADM

## 2025-03-17 RX ORDER — CEFAZOLIN SODIUM 1 G/3ML
INJECTION, POWDER, FOR SOLUTION INTRAMUSCULAR; INTRAVENOUS
Status: DISCONTINUED | OUTPATIENT
Start: 2025-03-17 | End: 2025-03-17 | Stop reason: SDUPTHER

## 2025-03-17 RX ORDER — FENTANYL CITRATE 50 UG/ML
INJECTION, SOLUTION INTRAMUSCULAR; INTRAVENOUS
Status: DISCONTINUED | OUTPATIENT
Start: 2025-03-17 | End: 2025-03-17 | Stop reason: SDUPTHER

## 2025-03-17 RX ORDER — SODIUM CHLORIDE 9 MG/ML
INJECTION, SOLUTION INTRAVENOUS
Status: DISCONTINUED | OUTPATIENT
Start: 2025-03-17 | End: 2025-03-17 | Stop reason: SDUPTHER

## 2025-03-17 RX ORDER — MIDAZOLAM HYDROCHLORIDE 1 MG/ML
INJECTION, SOLUTION INTRAMUSCULAR; INTRAVENOUS
Status: DISCONTINUED | OUTPATIENT
Start: 2025-03-17 | End: 2025-03-17 | Stop reason: SDUPTHER

## 2025-03-17 RX ORDER — VANCOMYCIN HYDROCHLORIDE 1 G/20ML
INJECTION, POWDER, LYOPHILIZED, FOR SOLUTION INTRAVENOUS
Status: DISCONTINUED | OUTPATIENT
Start: 2025-03-17 | End: 2025-03-17 | Stop reason: SDUPTHER

## 2025-03-17 RX ADMIN — MIDAZOLAM 1 MG: 1 INJECTION INTRAMUSCULAR; INTRAVENOUS at 09:18

## 2025-03-17 RX ADMIN — VALSARTAN 80 MG: 80 TABLET, FILM COATED ORAL at 11:46

## 2025-03-17 RX ADMIN — POTASSIUM CHLORIDE 40 MEQ: 1500 TABLET, EXTENDED RELEASE ORAL at 11:45

## 2025-03-17 RX ADMIN — SODIUM CHLORIDE, PRESERVATIVE FREE 10 ML: 5 INJECTION INTRAVENOUS at 11:45

## 2025-03-17 RX ADMIN — ASPIRIN 81 MG: 81 TABLET, COATED ORAL at 20:47

## 2025-03-17 RX ADMIN — ASPIRIN 81 MG: 81 TABLET, COATED ORAL at 11:45

## 2025-03-17 RX ADMIN — SODIUM CHLORIDE: 9 INJECTION, SOLUTION INTRAVENOUS at 08:55

## 2025-03-17 RX ADMIN — FENTANYL CITRATE 50 MCG: 50 INJECTION, SOLUTION INTRAMUSCULAR; INTRAVENOUS at 10:03

## 2025-03-17 RX ADMIN — FENTANYL CITRATE 25 MCG: 50 INJECTION, SOLUTION INTRAMUSCULAR; INTRAVENOUS at 09:29

## 2025-03-17 RX ADMIN — BUMETANIDE 1 MG: 0.25 INJECTION INTRAMUSCULAR; INTRAVENOUS at 11:46

## 2025-03-17 RX ADMIN — ACETAMINOPHEN 650 MG: 325 TABLET ORAL at 18:34

## 2025-03-17 RX ADMIN — VANCOMYCIN HYDROCHLORIDE 1000 MG: 1 INJECTION, POWDER, LYOPHILIZED, FOR SOLUTION INTRAVENOUS at 09:21

## 2025-03-17 RX ADMIN — CEFAZOLIN 2 G: 1 INJECTION, POWDER, FOR SOLUTION INTRAMUSCULAR; INTRAVENOUS at 09:20

## 2025-03-17 RX ADMIN — MIDAZOLAM 1 MG: 1 INJECTION INTRAMUSCULAR; INTRAVENOUS at 09:08

## 2025-03-17 RX ADMIN — ACETAMINOPHEN 650 MG: 325 TABLET ORAL at 23:25

## 2025-03-17 RX ADMIN — WATER 2000 MG: 1 INJECTION INTRAMUSCULAR; INTRAVENOUS; SUBCUTANEOUS at 17:11

## 2025-03-17 RX ADMIN — FENTANYL CITRATE 25 MCG: 50 INJECTION, SOLUTION INTRAMUSCULAR; INTRAVENOUS at 09:27

## 2025-03-17 ASSESSMENT — PAIN DESCRIPTION - DESCRIPTORS
DESCRIPTORS: SHARP
DESCRIPTORS: ACHING

## 2025-03-17 ASSESSMENT — PAIN DESCRIPTION - ORIENTATION
ORIENTATION: LEFT;UPPER
ORIENTATION: LEFT

## 2025-03-17 ASSESSMENT — PAIN SCALES - GENERAL
PAINLEVEL_OUTOF10: 7
PAINLEVEL_OUTOF10: 5

## 2025-03-17 ASSESSMENT — LIFESTYLE VARIABLES: SMOKING_STATUS: 0

## 2025-03-17 ASSESSMENT — PAIN DESCRIPTION - LOCATION
LOCATION: CHEST
LOCATION: SHOULDER

## 2025-03-17 ASSESSMENT — PAIN - FUNCTIONAL ASSESSMENT: PAIN_FUNCTIONAL_ASSESSMENT: PREVENTS OR INTERFERES SOME ACTIVE ACTIVITIES AND ADLS

## 2025-03-17 NOTE — DISCHARGE INSTRUCTIONS
weight gain of 3 pounds or more in 1 day         OR 5 pounds or more in one week  More shortness of breath  More swelling of your stomach, legs, ankles or feet  Feeling more tired, No energy  Dry hacky cough  Dizziness  More chest pain / discomfort       (CALL 498 IF ANY OF THE FOLLOWING OCCURS  Chest pain (not relieved with nitroglycerine, if you have been prescribed this medication)  Severe shortness of breath  Faint / Pass out  Confusion / cannot think clearly  If symptoms get worse           SMOKING - TOBACCO USE:  * IF YOU SMOKE - STOP!  Kick the habit.  [facility] Program is offered at [facility] and [facility]. Call [phone] extension [phone] for more information.             ACTIVITY:   (Ask your doctor when you will be able to return to work and before starting any exercise program.  Do not drive unless unless your doctor has given you permission to do so).  Start light exercise.  Even if you can only do a small amount, exercise will help you get stronger, have more energy, help manage your weight and decrease  stress. Walking is an easy way to get exercise. Start out slowly and  increase the amount you walk as tolerated  If you become short of breath, dizzy or have chest pain; stop, sit down, and rest.  If you feel \"wiped out\" the day after you exercise, walk at a slower pace or for a shorter distance. You can gradually increase the pace or amount of time.            (Do not exercise right after a meal or in extreme temperatures, such as above 85 degrees, if the air is really humid, or wind chill is less than 20 degrees)                                             ADDITIONAL INFORMATION:  Avoid getting sick from colds and the flu. Stay away from friends or family that you know may have a contagious illness  Get plenty of rest   Get a flu shot each year.  Get a pneumococcal vaccine shot. If you have had one before, ask your  not do any activities such as golfing, vacuuming, or mowing the lawn for 4 weeks.    Prevent any hard blows to the pacemaker site.    Wear your sling for 48 hours after your procedure, after 48 hours you do not need to wear the sling during the day, only at night if you feel you may not be able to follow your arm restrictions while you sleep.      Driving:  No driving until after 2 week follow up and cleared by the device clinic.     Special Instructions:  Let your dentist, doctor, or medical specialist know that you have a pacemaker so precautions can be taken to protect the device.    Your device is considered to be “MRI conditional”; meaning that under certain circumstances, MRI exams may be performed after 6 weeks post implantation  Your pacemaker may set off a metal detector, such as those used in airports and courtrooms.  Let security know that you have a pacemaker & show them your card.    Remote Monitor:  You may receive your monitor prior to leaving the hospital, if not a monitor can be mailed to your home. In some cases, an kaiden can be used with newer smartphones to provide monitoring services. Please discuss this with the device clinic at your follow up appointment in 2 weeks.     ID Card:  You will have a temporary ID card until a permanent card is sent to you by the device company.  The permanent card will look like a 's license or credit card and should arrive within 8 weeks.  Carry your ID card with you at all times.    Follow Up:  You will be seen on Monday 3/31/25 at 10:00 am at the [facility] for incision check and brief pacemaker evaluation.  If you need to reschedule this appointment, call the office at [phone].    [address] [facility] [id number] [phone]    If no answer at the above number please call [phone] for assistance

## 2025-03-17 NOTE — PROGRESS NOTES
Patient received the Sacrament of the Anointing of the Sick by Father Bayron Gibbons on Sunday, March 16, 2025.    If additional support is requested or needed please reach out to Spiritual Health ([id number]).    Chap. Kris Cespedes MDIV, BCC

## 2025-03-17 NOTE — PLAN OF CARE
Problem: Discharge Planning  Goal: Discharge to home or other facility with appropriate resources  3/17/2025 1319 by Angelic Bailey RN  Outcome: Progressing     Problem: Safety - Adult  Goal: Free from fall injury  3/17/2025 1319 by Angelic Bailey RN  Outcome: Progressing     Problem: ABCDS Injury Assessment  Goal: Absence of physical injury  3/17/2025 1319 by Angelic Bailey RN  Outcome: Progressing     Problem: Skin/Tissue Integrity  Goal: Skin integrity remains intact  Description: 1.  Monitor for areas of redness and/or skin breakdown  2.  Assess vascular access sites hourly  3.  Every 4-6 hours minimum:  Change oxygen saturation probe site  4.  Every 4-6 hours:  If on nasal continuous positive airway pressure, respiratory therapy assess nares and determine need for appliance change or resting period  3/17/2025 1319 by Angelic Bailey, RN  Outcome: Progressing

## 2025-03-17 NOTE — ANESTHESIA POSTPROCEDURE EVALUATION
Department of Anesthesiology  Postprocedure Note    Patient: Julita Madrid  MRN: [id number]  YOB: 1945  Date of evaluation: 3/17/2025    Procedure Summary       Date: 03/17/25 Room / Location: [facility] LAB 1 / [facility] CARDIAC CATH LAB    Anesthesia Start: 0855 Anesthesia Stop: 1058    Procedure: Insert PPM dual Diagnosis:       Sinus node dysfunction (HCC)      (Complete heart block  Sinus node dysfunction)    Providers: Nighat Beauchamp MD Responsible Provider: Deandre Simpson DO    Anesthesia Type: MAC ASA Status: 4            Anesthesia Type: MAC    Maria Phase I:      Maria Phase II:      Anesthesia Post Evaluation    Patient location during evaluation: PACU  Patient participation: complete - patient participated  Level of consciousness: awake and awake and alert  Pain score: 0  Airway patency: patent  Nausea & Vomiting: no nausea and no vomiting  Cardiovascular status: blood pressure returned to baseline and hemodynamically stable  Respiratory status: acceptable, nonlabored ventilation, room air and spontaneous ventilation  Hydration status: euvolemic  Pain management: adequate and satisfactory to patient        There were no known notable events for this encounter.

## 2025-03-17 NOTE — ANESTHESIA PRE PROCEDURE
(!) 134/37 (!) 156/43 (!) 154/44    Pulse: (!) 37 (!) 30 (!) 33    Resp: 15 14 14    Temp: 37.5 °C (99.5 °F) 38 °C (100.4 °F) 37.3 °C (99.1 °F)    TempSrc: Temporal Temporal Temporal    SpO2: 98% 98% 99%    Weight:    53.4 kg (117 lb 11.6 oz)                                              BP Readings from Last 3 Encounters:   03/17/25 (!) 154/44   03/12/25 (!) 163/73   02/28/25 (!) 162/69       NPO Status:                                                                                 BMI:   Wt Readings from Last 3 Encounters:   03/17/25 53.4 kg (117 lb 11.6 oz)   02/28/25 58.8 kg (129 lb 10.1 oz)   11/11/24 51.7 kg (114 lb)     Body mass index is 20.85 kg/m².    CBC:   Lab Results   Component Value Date/Time    WBC 3.7 03/17/2025 04:59 AM    RBC 3.00 03/17/2025 04:59 AM    HGB 9.4 03/17/2025 04:59 AM    HCT 30.2 03/17/2025 04:59 AM    .7 03/17/2025 04:59 AM    RDW 13.4 03/17/2025 04:59 AM     03/17/2025 04:59 AM       CMP:   Lab Results   Component Value Date/Time     03/17/2025 04:59 AM    K 3.6 03/17/2025 04:59 AM     03/17/2025 04:59 AM    CO2 21 03/17/2025 04:59 AM    BUN 17 03/17/2025 04:59 AM    CREATININE 1.3 03/17/2025 04:59 AM    GFRAA >60 04/28/2022 11:59 AM    LABGLOM 41 03/17/2025 04:59 AM    LABGLOM 59 01/10/2024 03:54 PM    GLUCOSE 86 03/17/2025 04:59 AM    CALCIUM 7.9 03/17/2025 04:59 AM    BILITOT 0.6 03/16/2025 05:19 AM    ALKPHOS 222 03/16/2025 05:19 AM    AST 28 03/16/2025 05:19 AM    ALT 7 03/16/2025 05:19 AM       POC Tests: No results for input(s): \"POCGLU\", \"POCNA\", \"POCK\", \"POCCL\", \"POCBUN\", \"POCHEMO\", \"POCHCT\" in the last 72 hours.    Coags:   Lab Results   Component Value Date/Time    PROTIME 11.7 03/15/2025 12:10 PM    INR 1.1 03/15/2025 12:10 PM    APTT 32.9 03/15/2025 12:10 PM    APTT 31.8 02/17/2015 10:00 AM       HCG (If Applicable): No results found for: \"PREGTESTUR\", \"PREGSERUM\", \"HCG\", \"HCGQUANT\"     ABGs: No results found for: \"PHART\", \"PO2ART\", \"[id number]\",

## 2025-03-17 NOTE — CARE COORDINATION
3/17/25, Patient admitted for Complete Heart Block.  SW went to meet with patient who is off the floor getting a Pacemaker.  SW to meet with patient once she is back in room.  SW to follow.      Natasha Mayfield DA  [facility] Case Management  [phone]

## 2025-03-17 NOTE — PROGRESS NOTES
Spiritual Health History and Assessment/Progress Note  [facility]    (P) Spiritual/Emotional Needs,  ,  ,      Name: Julita Madrid MRN: [id number]    Age: 79 y.o.     Sex: female   Language: English   Zoroastrianism: Pentecostal   Complete heart block (HCC)     Date: 3/17/2025                           Spiritual Assessment began in [facility]        Referral/Consult From: (P) Rounding   Encounter Overview/Reason: (P) Spiritual/Emotional Needs  Service Provided For: (P) Patient and family together    Nidia, Belief, Meaning:   Patient identifies as spiritual  Family/Friends identify as spiritual      Importance and Influence:  Patient has spiritual/personal beliefs that influence decisions regarding their health  Family/Friends have spiritual/personal beliefs that influence decisions regarding the patient's health    Community:  Patient feels well-supported. Support system includes: Extended family  Family/Friends feel well-supported. Support system includes: Extended family    Assessment and Plan of Care:     Patient Interventions include: Facilitated expression of thoughts and feelings and Affirmed coping skills/support systems  Family/Friends Interventions include: Facilitated expression of thoughts and feelings and Affirmed coping skills/support systems    Patient Plan of Care: No spiritual needs identified for follow-up  Family/Friends Plan of Care: No spiritual needs identified for follow-up    Electronically signed by Chaplain RADHA on 3/17/2025 at 2:40 PM

## 2025-03-17 NOTE — PROGRESS NOTES
Hospitalist Progress Note      PCP: Anjel Duran DO    Date of Admission: 3/15/2025        Hospital Course:  79 y.o. female presented with ABNORMAL ZIO.  DR AVILA, CALLED THE SON TO BRING HIS MOTHER IN DUE TO BRADYCARDIA ON THE ZIO MONITOR.  THE PATIENT RECENTLY HAD A LEFT KATHLEEN, AD HAS BEEN TIRED AND WEAK.  NO CHEST PAIN     3/17  HAD PACER INSERTED TODAY    Subjective: FEELING BETTER           Medications:  Reviewed    Infusion Medications    sodium chloride       Scheduled Medications    sodium chloride flush  5-40 mL IntraVENous 2 times per day    ceFAZolin  2,000 mg IntraVENous Q12H    bumetanide  1 mg IntraVENous Daily    aspirin  81 mg Oral BID    valsartan  80 mg Oral Daily    sodium chloride flush  5-40 mL IntraVENous 2 times per day    [Held by provider] enoxaparin  40 mg SubCUTAneous Daily     PRN Meds: sodium chloride flush, sodium chloride flush, sodium chloride, polyethylene glycol, acetaminophen **OR** acetaminophen      Intake/Output Summary (Last 24 hours) at 3/17/2025 1230  Last data filed at 3/17/2025 1038  Gross per 24 hour   Intake 650 ml   Output 710 ml   Net -60 ml       Exam:    BP (!) 165/67   Pulse 74   Temp 98.1 °F (36.7 °C)   Resp 18   Ht 1.6 m (5' 3\")   Wt 53.4 kg (117 lb 11.6 oz)   SpO2 97%   BMI 20.85 kg/m²       General appearance:  No apparent distress, appears stated age.  HEENT:  Normal cephalic, .  Neck: Supple, with full range of motion. No jugular venous distention. Trachea midline.  Respiratory:  Normal respiratory effort. Clear to auscultation,   Cardiovascular:  RRR  Abdomen: Soft, non-tender, non-distended with normal bowel sounds.  Musculoskeletal:  No clubbing, cyanosis or edema bilaterally.    Skin: smooth and dry   Neurologic:   Cranial nerves: II-XII intact,   Psychiatric:  Alert and oriented x 2             Labs:   Recent Labs     03/15/25  1210 03/16/25  0519 03/17/25  0459   WBC 4.5 4.8 3.7*   HGB 10.6* 10.1* 9.4*   HCT 34.4 32.0* 30.2*    166  145     Recent Labs     03/15/25  1210 03/16/25  0519 03/17/25  0459    143 144   K 3.8 4.1 3.6   * 108* 110*   CO2 19* 21* 21*   BUN 20 17 17   CREATININE 1.2* 1.3* 1.3*   CALCIUM 8.3* 8.2* 7.9*     Recent Labs     03/15/25  1210 03/16/25  0519   AST 23 28   ALT 8 7   BILITOT 0.5 0.6   ALKPHOS 239* 222*     Recent Labs     03/15/25  1210   INR 1.1     No results for input(s): \"CKTOTAL\", \"TROPONINI\" in the last 72 hours.  Recent Labs     03/15/25  1210 03/16/25  0519   AST 23 28   ALT 8 7   BILITOT 0.5 0.6   ALKPHOS 239* 222*     No results for input(s): \"LACTA\" in the last 72 hours.  No results found for: \"LABURIC\", \"URICACID\"  No results found for: \"AMMONIA\"    Assessment:    Active Hospital Problems    Diagnosis Date Noted    Acute on chronic systolic heart failure (HCC) [I50.23] 03/16/2025    Heart block determined by electrocardiography [I45.9] 03/16/2025    Complete heart block (HCC) [I44.2] 03/15/2025    ROBERT (acute kidney injury) [N17.9] 02/22/2025    Chronic renal disease, stage III (HCC) [282935] [N18.30] 01/15/2024    Essential hypertension, benign [I10] 09/08/2014   S/P PACER    Plan:  BUMEX   EP CONSULT  DIOVAN        DVT Prophylaxis: SCD  Diet: Diet NPO  ADULT DIET; Regular; Low Sodium (2 gm)  Diet NPO Exceptions are: Sips of Water with Meds  Code Status: Full Code     PT/OT Eval Status: ORDERED     Dispo -  HOME       Time/Communication  Greater than 50% of time spent, in counseling and coordination of care at the bedside regarding goals of care, diagnosis and prognosis.    Electronically signed by Andrez Kim DO on 3/17/2025 at 12:30 PM BRI

## 2025-03-18 ENCOUNTER — APPOINTMENT (OUTPATIENT)
Dept: GENERAL RADIOLOGY | Age: 80
DRG: 242 | End: 2025-03-18
Payer: MEDICARE

## 2025-03-18 VITALS
WEIGHT: 119.05 LBS | BODY MASS INDEX: 21.09 KG/M2 | RESPIRATION RATE: 15 BRPM | DIASTOLIC BLOOD PRESSURE: 55 MMHG | SYSTOLIC BLOOD PRESSURE: 130 MMHG | HEIGHT: 63 IN | TEMPERATURE: 97.2 F | OXYGEN SATURATION: 96 % | HEART RATE: 60 BPM

## 2025-03-18 LAB
ANION GAP SERPL CALCULATED.3IONS-SCNC: 12 MMOL/L (ref 7–16)
BASOPHILS # BLD: 0.06 K/UL (ref 0–0.2)
BASOPHILS NFR BLD: 1 % (ref 0–2)
BNP SERPL-MCNC: ABNORMAL PG/ML (ref 0–450)
BUN SERPL-MCNC: 16 MG/DL (ref 6–23)
CALCIUM SERPL-MCNC: 8.1 MG/DL (ref 8.6–10.2)
CHLORIDE SERPL-SCNC: 105 MMOL/L (ref 98–107)
CO2 SERPL-SCNC: 24 MMOL/L (ref 22–29)
CREAT SERPL-MCNC: 1.3 MG/DL (ref 0.5–1)
EOSINOPHIL # BLD: 0.5 K/UL (ref 0.05–0.5)
EOSINOPHILS RELATIVE PERCENT: 11 % (ref 0–6)
ERYTHROCYTE [DISTWIDTH] IN BLOOD BY AUTOMATED COUNT: 13.3 % (ref 11.5–15)
GFR, ESTIMATED: 41 ML/MIN/1.73M2
GLUCOSE SERPL-MCNC: 100 MG/DL (ref 74–99)
HCT VFR BLD AUTO: 33.2 % (ref 34–48)
HGB BLD-MCNC: 10.4 G/DL (ref 11.5–15.5)
IMM GRANULOCYTES # BLD AUTO: <0.03 K/UL (ref 0–0.58)
IMM GRANULOCYTES NFR BLD: 0 % (ref 0–5)
LYMPHOCYTES NFR BLD: 0.67 K/UL (ref 1.5–4)
LYMPHOCYTES RELATIVE PERCENT: 15 % (ref 20–42)
MCH RBC QN AUTO: 31.8 PG (ref 26–35)
MCHC RBC AUTO-ENTMCNC: 31.3 G/DL (ref 32–34.5)
MCV RBC AUTO: 101.5 FL (ref 80–99.9)
MONOCYTES NFR BLD: 0.51 K/UL (ref 0.1–0.95)
MONOCYTES NFR BLD: 12 % (ref 2–12)
NEUTROPHILS NFR BLD: 61 % (ref 43–80)
NEUTS SEG NFR BLD: 2.68 K/UL (ref 1.8–7.3)
PLATELET # BLD AUTO: 134 K/UL (ref 130–450)
PMV BLD AUTO: 9.9 FL (ref 7–12)
POTASSIUM SERPL-SCNC: 3.9 MMOL/L (ref 3.5–5)
RBC # BLD AUTO: 3.27 M/UL (ref 3.5–5.5)
SODIUM SERPL-SCNC: 141 MMOL/L (ref 132–146)
WBC OTHER # BLD: 4.4 K/UL (ref 4.5–11.5)

## 2025-03-18 PROCEDURE — 2500000003 HC RX 250 WO HCPCS: Performed by: INTERNAL MEDICINE

## 2025-03-18 PROCEDURE — 71046 X-RAY EXAM CHEST 2 VIEWS: CPT

## 2025-03-18 PROCEDURE — 6360000002 HC RX W HCPCS: Performed by: INTERNAL MEDICINE

## 2025-03-18 PROCEDURE — 36415 COLL VENOUS BLD VENIPUNCTURE: CPT

## 2025-03-18 PROCEDURE — 99232 SBSQ HOSP IP/OBS MODERATE 35: CPT | Performed by: NURSE PRACTITIONER

## 2025-03-18 PROCEDURE — 80048 BASIC METABOLIC PNL TOTAL CA: CPT

## 2025-03-18 PROCEDURE — 6370000000 HC RX 637 (ALT 250 FOR IP): Performed by: INTERNAL MEDICINE

## 2025-03-18 PROCEDURE — 85025 COMPLETE CBC W/AUTO DIFF WBC: CPT

## 2025-03-18 PROCEDURE — 83880 ASSAY OF NATRIURETIC PEPTIDE: CPT

## 2025-03-18 RX ORDER — DOXYCYCLINE HYCLATE 100 MG
100 TABLET ORAL 2 TIMES DAILY
Qty: 14 TABLET | Refills: 0 | Status: SHIPPED | OUTPATIENT
Start: 2025-03-18 | End: 2025-03-25

## 2025-03-18 RX ORDER — BUMETANIDE 1 MG/1
1 TABLET ORAL DAILY
Qty: 30 TABLET | Refills: 0 | Status: SHIPPED | OUTPATIENT
Start: 2025-03-18

## 2025-03-18 RX ORDER — POTASSIUM CHLORIDE 1500 MG/1
20 TABLET, EXTENDED RELEASE ORAL DAILY
Qty: 30 TABLET | Refills: 0 | Status: SHIPPED | OUTPATIENT
Start: 2025-03-18

## 2025-03-18 RX ADMIN — ACETAMINOPHEN 650 MG: 325 TABLET ORAL at 09:43

## 2025-03-18 RX ADMIN — VALSARTAN 80 MG: 80 TABLET, FILM COATED ORAL at 09:43

## 2025-03-18 RX ADMIN — ASPIRIN 81 MG: 81 TABLET, COATED ORAL at 09:43

## 2025-03-18 RX ADMIN — BUMETANIDE 1 MG: 0.25 INJECTION INTRAMUSCULAR; INTRAVENOUS at 09:00

## 2025-03-18 RX ADMIN — WATER 2000 MG: 1 INJECTION INTRAMUSCULAR; INTRAVENOUS; SUBCUTANEOUS at 05:48

## 2025-03-18 ASSESSMENT — PAIN DESCRIPTION - LOCATION: LOCATION: CHEST;INCISION

## 2025-03-18 ASSESSMENT — PAIN DESCRIPTION - ORIENTATION: ORIENTATION: LEFT

## 2025-03-18 ASSESSMENT — PAIN DESCRIPTION - DESCRIPTORS: DESCRIPTORS: SORE;ACHING

## 2025-03-18 ASSESSMENT — PAIN SCALES - GENERAL: PAINLEVEL_OUTOF10: 5

## 2025-03-18 NOTE — NURSE NAVIGATOR
Patient's chart updated to reflect:      .    - HF care plan, HF education points and HF discharge instructions.  -Orders: 2 gram sodium diet, daily weights, I/O.  -PCP or cardiology follow up appointments to be scheduled within 7 days of hospital discharge.  -CHF education session will be provided to the patient prior to hospital discharge.     Gill Hernandez RN, CHFN   Heart Failure Navigator

## 2025-03-18 NOTE — PROGRESS NOTES
[facility]- [facility] [place] [facility]  Consultation Report  PATIENT: Julita Madrid  MEDICAL RECORD NUMBER: [id number]  DATE OF SERVICE:  3/18/2025  ATTENDING ELECTROPHYSIOLOGIST: Long Talley DO  PRIMARY ELECTROPHYSIOLOGIST: Nighat Beauchamp MD  REFERRING PHYSICIAN: No ref. provider found and Anjel Duran DO  CHIEF COMPLAINT: Fatigue, AV block    HPI: This is a 79 y.o. female with a history of hypertension, esophageal stricture treated with balloon dilatation and followed by Dr. Cardenas from cardiology.  She is only on valsartan, baby aspirin at home.  Her son and daughter are at the bedside today and a lot of the history is obtained from them.  She has been seen by [facility] during her last hospitalization after a fall at home that resulted in left hip fracture.  However she has been getting progressively weaker over the past year and decreasing her activity.  She spends a lot of time in bed but does walk around her house to go to the bathroom or get her food.  After a fall in February 2025, she underwent surgery for hip fracture.  Postoperatively she was noted to be bradycardic and was sent home with a Zio monitor.  This was reviewed last week and revealed multiple episodes of complete heart block during the day and at night as well as long pauses lasting up to 5.5 seconds at night.  No further episodes of syncope or near syncope however.  The patient denies any cardiac symptoms including those of chest discomfort or dyspnea but her activity is limited.  After her event monitor was interpreted I spoke to the patient's son on Friday and suggested urgent hospitalization for intervention as might be needed.    Cardiac electrophysiology service is consulted for second and third-degree AV block and pauses lasting up to 5.5 seconds on event monitoring..      3/18/25: s/p pacemaker implant. CXR showing no pneumothorax with leads in stable position. Aquacel dressing intact to LCW

## 2025-03-18 NOTE — ACP (ADVANCE CARE PLANNING)
3/18/25, Advance Care Planning   Healthcare Decision Maker:    Primary Decision Maker: Anson Madrid - Spouse - [phone]    Secondary Decision Maker: JuliusAnson - Child - [phone]    Click here to complete Healthcare Decision Makers including selection of the Healthcare Decision Maker Relationship (ie \"Primary\").

## 2025-03-18 NOTE — CONSULTS
[facility]   Inpatient CHF Nurse Navigator Consult      Cardiologist: Dr. Ronald Madrid is a 79 y.o. (1945) female with a history of HFpEF, most recent EF: 50-55% 3/16/2025      Patient was awake and alert, laying in bed during the consultation and is agreeable to heart failure education. She was engaged and asked appropriate questions throughout the education session.     Barriers identified during consult contributing to HF Hospitalization:  [] Limited medication adherence   [] Poor health literacy, education regarding HF medications provided   [] Pill box provided to patient  [] Difficulty affording medications  [] Difficulty obtaining/ managing medications  [] Prescription assistance information given     [] Not weighing themselves daily  [x] Weight log provided for easy monitoring  [] Scale provided     [] Not following low sodium diet  [] Food insecurity   [x] 2 gram sodium diet education provided   [] Low sodium recipes provided  [] Sodium free seasoning provided   [] Low sodium meal delivery options given to patient  [] Dietician consulted     [] Lack of transportation to appointments     [] Depression, given chronic illness  [] Primary team notified     [] Goals of care need addressed  [] Palliative care consulted     [] CHF CHW consulted, to assist with         Chart Reviewed:  Diet: ADULT DIET; Regular; Low Sodium (2 gm)   Daily Weights: Patient Vitals for the past 96 hrs (Last 3 readings):   Weight   03/18/25 0639 54 kg (119 lb 0.8 oz)   03/17/25 0614 53.4 kg (117 lb 11.6 oz)   03/15/25 1214 58.8 kg (129 lb 10.1 oz)     I/O:   Intake/Output Summary (Last 24 hours) at 3/18/2025 1707  Last data filed at 3/18/2025 1208  Gross per 24 hour   Intake 240 ml   Output 600 ml   Net -360 ml       [] Nursing staff/manager notified of inaccurate parks weights or I/O        Discharge Plan:  Above identified barriers reviewed and needs addressed    Patient/family  one week. Also, if you should have a significant weight loss of 3# or more in one day to call the doctor, they may need to decrease or hold the diuretic dose. On days you feels nauseated and not eating / drinking, having emesis or diarrhea,  informed to call the cardiologist  / doctor, they may need to decrease or hold diuretic to avoid dehydration.  Again, stressed the importance of informing their medical provider the first day of onset of any of the signs and symptoms in the \"Yellow Zone\" of the HF Zones.     The Heart Failure Booklet given to the patient with additional patient education addressing:  What is Heart Failure?  Things You Can Do to Live Well with HF  How to Take Your Medications  How to Eat Less Salt  [place] its Salt?  Exercising Well with Heart Failure  Signs and symptoms of HF to report  Weight Yourself Each Day  Home Self Management- activity, weight tracking, taking medications as prescribed, meals /diet planning (sodium and fluid restriction), how to read food labels, keeping physician follow ups, smoking cessation, follow the “Heart Failure Zones”  The Heart Failure zones  Every Dose Every Day    Instructed to call 911 if you have any of the following symptoms:  Struggling to breathe unrelieved with rest  Having chest pain  Confusion or can’t think clearly      Patient verbalizes understanding of above.   Greater than 30 minutes was spent educating patient.        Gill Hernandez RN, CHFN   Heart Failure Navigator

## 2025-03-18 NOTE — PROGRESS NOTES
Hospitalist Progress Note      PCP: Anjel Duran DO    Date of Admission: 3/15/2025        Hospital Course:   79 y.o. female presented with ABNORMAL ZIO.  DR AVILA, CALLED THE SON TO BRING HIS MOTHER IN DUE TO BRADYCARDIA ON THE ZIO MONITOR.  THE PATIENT RECENTLY HAD A LEFT KATHLEEN, AD HAS BEEN TIRED AND WEAK.  NO CHEST PAIN      3/17  HAD PACER INSERTED TODAY           Subjective:  NO COMPLAINTS           Medications:  Reviewed    Infusion Medications    sodium chloride       Scheduled Medications    sodium chloride flush  5-40 mL IntraVENous 2 times per day    ceFAZolin  2,000 mg IntraVENous Q12H    bumetanide  1 mg IntraVENous Daily    aspirin  81 mg Oral BID    valsartan  80 mg Oral Daily    sodium chloride flush  5-40 mL IntraVENous 2 times per day    [Held by provider] enoxaparin  40 mg SubCUTAneous Daily     PRN Meds: sodium chloride flush, sodium chloride flush, sodium chloride, polyethylene glycol, acetaminophen **OR** acetaminophen      Intake/Output Summary (Last 24 hours) at 3/18/2025 1357  Last data filed at 3/18/2025 1208  Gross per 24 hour   Intake 120 ml   Output 600 ml   Net -480 ml       Exam:    BP (!) 98/53   Pulse 64   Temp 97 °F (36.1 °C) (Temporal)   Resp 14   Ht 1.6 m (5' 3\")   Wt 54 kg (119 lb 0.8 oz)   SpO2 99%   BMI 21.09 kg/m²       General appearance:  No apparent distress, appears stated age.  HEENT:  Normal cephalic, .  Neck: Supple, with full range of motion. No jugular venous distention. Trachea midline.  Respiratory:  Normal respiratory effort. Clear to auscultation,   Cardiovascular:  RRR  Abdomen: Soft, non-tender, non-distended with normal bowel sounds.  Musculoskeletal:  No clubbing, cyanosis or edema bilaterally.    Skin: smooth and dry   Neurologic:   Cranial nerves: II-XII intact,   Psychiatric:  Alert and oriented x 2                 Labs:   Recent Labs     03/16/25  0519 03/17/25  0459 03/18/25  0525   WBC 4.8 3.7* 4.4*   HGB 10.1* 9.4* 10.4*   HCT 32.0*  30.2* 33.2*    145 134     Recent Labs     03/16/25  0519 03/17/25  0459 03/18/25  0525    144 141   K 4.1 3.6 3.9   * 110* 105   CO2 21* 21* 24   BUN 17 17 16   CREATININE 1.3* 1.3* 1.3*   CALCIUM 8.2* 7.9* 8.1*     Recent Labs     03/16/25  0519   AST 28   ALT 7   BILITOT 0.6   ALKPHOS 222*     No results for input(s): \"INR\" in the last 72 hours.  No results for input(s): \"CKTOTAL\", \"TROPONINI\" in the last 72 hours.  Recent Labs     03/16/25  0519   AST 28   ALT 7   BILITOT 0.6   ALKPHOS 222*     No results for input(s): \"LACTA\" in the last 72 hours.  No results found for: \"LABURIC\", \"URICACID\"  No results found for: \"AMMONIA\"    Assessment:    Active Hospital Problems    Diagnosis Date Noted    Acute on chronic heart failure with preserved ejection fraction (HCC) [I50.33] 03/17/2025    Acute on chronic systolic heart failure (HCC) [I50.23] 03/16/2025    Heart block determined by electrocardiography [I45.9] 03/16/2025    Complete heart block (HCC) [I44.2] 03/15/2025    ROBERT (acute kidney injury) [N17.9] 02/22/2025    Chronic renal disease, stage III ([facility]) [082671] [N18.30] 01/15/2024    Essential hypertension, benign [I10] 09/08/2014   S/P PACER    Plan:DC HOME  Time/Communication  Greater than 50% of time spent, in counseling and coordination of care at the bedside regarding goals of care, diagnosis and prognosis.    Electronically signed by Andrez Kim DO on 3/18/2025 at 1:57 PM BRI

## 2025-03-18 NOTE — CARE COORDINATION
3/18/25, Patient admitted for Complete Heart Block.  SW met with patient and son in room to discuss transition of care/SW role.  Patient lives with  and son is currently staying with patient in a ranch home with 4 steps to enter the room.  DME owned is a WC, WW and extended tub bench.  Patient is getting HHC services from [facility] for PT/OT and aide for bathing.  Discussed adding SN which will do.  Per Sienna from [facility] that patient is active with-just will need CHIQUI order and Sienna to have SN added by office.  PCP is Dr. Duran and Pharmacy is [facility].  No JOVANY hx.  Son to be transportation home for patient.  SW to follow.      Case Management Assessment  Initial Evaluation    Date/Time of Evaluation: 3/18/2025 11:37 AM  Assessment Completed by: HERMINIO Cottrell    If patient is discharged prior to next notation, then this note serves as note for discharge by case management.    Patient Name: Julita Madrid                   YOB: 1945  Diagnosis: Complete heart block (HCC) [I44.2]  Post-operative state [Z98.890]  Acute on chronic systolic CHF (congestive heart failure) (HCC) [I50.23]  Heart block determined by electrocardiography [I45.9]                   Date / Time: 3/15/2025 11:53 AM    Patient Admission Status: Inpatient   Readmission Risk (Low < 19, Mod (19-27), High > 27): Readmission Risk Score: 16.8    Current PCP: Anjel Duran, DO  PCP verified by CM? Yes    Chart Reviewed: Yes      History Provided by: Patient, Child/Family  Patient Orientation: Alert and Oriented    Patient Cognition: Alert    Hospitalization in the last 30 days (Readmission):  Yes    If yes, Readmission Assessment in  Navigator will be completed.    Advance Directives:      Code Status: Full Code   Patient's Primary Decision Maker is: Legal Next of Kin    Primary Decision Maker: Anson Madrid - Spouse - [phone]    Secondary Decision Maker: Madrid,Anson - Child - [phone]    Discharge  Planning:    Patient lives with: Family Members Type of Home: House  Primary Care Giver: Family  Patient Support Systems include: Children, Spouse/Significant Other   Current Financial resources:    Current community resources:    Current services prior to admission: Home Care            Current DME:              Type of Home Care services:  PT, OT, Nursing Services    ADLS  Prior functional level: Independent in ADLs/IADLs, Assistance with the following:, Mobility, Bathing, Cooking, Shopping, Housework  Current functional level:      PT AM-PAC:   /24  OT AM-PAC:   /24    Family can provide assistance at DC:    Would you like Case Management to discuss the discharge plan with any other family members/significant others, and if so, who?    Plans to Return to Present Housing:    Other Identified Issues/Barriers to RETURNING to current housing: N/A  Potential Assistance needed at discharge: Outpatient PT/OT            Potential DME:    Patient expects to discharge to: House  Plan for transportation at discharge:      Financial    Payor: MEDICARE / Plan: MEDICARE PART A AND B / Product Type: *No Product type* /     Does insurance require precert for SNF: No    Potential assistance Purchasing Medications: No  Meds-to-Beds request: Yes      [id number] - [place], [place] - [address] [facility] [phone] - F [phone]  [address] [facility] [address]  Phone: [phone] Fax: [phone]      Notes:    Factors facilitating achievement of predicted outcomes: Family support    Barriers to discharge: N/A    Additional Case Management Notes: See Above    The Plan for Transition of Care is related to the following treatment goals of Complete heart block (HCC) [I44.2]  Post-operative state [Z98.890]  Acute on chronic systolic CHF (congestive heart failure) (HCC) [I50.23]  Heart block determined by electrocardiography [I45.9]    IF APPLICABLE: The Patient and/or patient representative Julita and her family

## 2025-03-19 NOTE — DISCHARGE SUMMARY
Hospitalist Discharge Summary    Patient ID: Julita Madrid   Patient : 1945  Patient's PCP: Anjel Duran DO    Admit Date: 3/15/2025   Admitting Physician: Andrez Kim DO    Discharge Date:  2025  Discharge Physician: Andrez Kim DO   Discharge Condition: Stable  Discharge Disposition: Home      Discharge Diagnoses:     Active Hospital Problems    Diagnosis Date Noted    Acute on chronic heart failure with preserved ejection fraction (HCC) [I50.33] 2025    Acute on chronic systolic heart failure (HCC) [I50.23] 2025    Heart block determined by electrocardiography [I45.9] 2025    Complete heart block (HCC) [I44.2] 03/15/2025    ROBERT (acute kidney injury) [N17.9] 2025    Chronic renal disease, stage III (HCC) [579475] [N18.30] 01/15/2024    Essential hypertension, benign [I10] 2014           Hospital course in brief:   79 y.o. female presented with ABNORMAL ZIO.  DR AVILA, CALLED THE SON TO BRING HIS MOTHER IN DUE TO BRADYCARDIA ON THE ZIO MONITOR.  THE PATIENT RECENTLY HAD A LEFT KATHLEEN, AD HAS BEEN TIRED AND WEAK.  NO CHEST PAIN      3/17  HAD PACER INSERTED TODAY         PHYSICAL EXAM:    BP (!) 130/55   Pulse 60   Temp 97.2 °F (36.2 °C) (Temporal)   Resp 15   Ht 1.6 m (5' 3\")   Wt 54 kg (119 lb 0.8 oz)   SpO2 96%   BMI 21.09 kg/m²     General appearance:  No apparent distress, appears stated age.  HEENT:  Normal cephalic, .  Neck: Supple, with full range of motion. No jugular venous distention. Trachea midline.  Respiratory:  Normal respiratory effort. Clear to auscultation,   Cardiovascular:  RRR  Abdomen: Soft, non-tender, non-distended with normal bowel sounds.  Musculoskeletal:  No clubbing, cyanosis or edema bilaterally.    Skin: smooth and dry   Neurologic:   Cranial nerves: II-XII intact,   Psychiatric:  Alert and oriented x 2       Prior to Admission medications    Medication Sig Start Date End Date Taking? Authorizing Provider

## 2025-03-19 NOTE — PROGRESS NOTES
CLINICAL PHARMACY NOTE: MEDS TO BEDS    Total # of Prescriptions Filled: 2   The following medications were delivered to the patient:  Bumetadine 1 mg  Potassium chloride ER 20 mg    Additional Documentation: delivered to pt

## 2025-03-20 LAB
EKG ATRIAL RATE: 80 BPM
EKG P AXIS: 67 DEGREES
EKG P-R INTERVAL: 234 MS
EKG Q-T INTERVAL: 444 MS
EKG QRS DURATION: 140 MS
EKG QTC CALCULATION (BAZETT): 512 MS
EKG R AXIS: -133 DEGREES
EKG T AXIS: -15 DEGREES
EKG VENTRICULAR RATE: 80 BPM

## 2025-03-20 PROCEDURE — 93010 ELECTROCARDIOGRAM REPORT: CPT | Performed by: INTERNAL MEDICINE

## 2025-03-24 ENCOUNTER — TELEPHONE (OUTPATIENT)
Age: 80
End: 2025-03-24
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
## 2025-03-24 NOTE — TELEPHONE ENCOUNTER
I called Julita  to see how she's doing since her PPM insertion on 3/17/2.  I spoke to her son he states she's doing fine & that the aquacel dressing remains intact. He denies any fevers, redness, bleeding, drainage, or swelling from PPM incision site. I reminded him she is  to remove the aquacel dsg today, not to touch the steri-strips, & to continue to wear her sling at night for 4 weeks; along with not abducting the L arm above the shoulder. She's also aware of [facility] 3[facility]/25 at 10:00 am.  He offers no complaints & is thankful for the phone call.

## 2025-03-25 ENCOUNTER — APPOINTMENT (OUTPATIENT)
Dept: GENERAL RADIOLOGY | Age: 80
DRG: 312 | End: 2025-03-25
Payer: MEDICARE

## 2025-03-25 ENCOUNTER — APPOINTMENT (OUTPATIENT)
Dept: CT IMAGING | Age: 80
DRG: 312 | End: 2025-03-25
Payer: MEDICARE

## 2025-03-25 ENCOUNTER — HOSPITAL ENCOUNTER (INPATIENT)
Age: 80
LOS: 1 days | Discharge: HOME HEALTH | DRG: 312 | End: 2025-03-29
Attending: EMERGENCY MEDICINE | Admitting: INTERNAL MEDICINE
Payer: MEDICARE

## 2025-03-25 DIAGNOSIS — R55 SYNCOPE, UNSPECIFIED SYNCOPE TYPE: Primary | ICD-10-CM

## 2025-03-25 PROBLEM — Z95.0 CARDIAC PACEMAKER IN SITU: Status: ACTIVE | Noted: 2025-03-25

## 2025-03-25 LAB
ALBUMIN SERPL-MCNC: 3.4 G/DL (ref 3.5–5.2)
ALP SERPL-CCNC: 193 U/L (ref 35–104)
ALT SERPL-CCNC: 7 U/L (ref 0–32)
ANION GAP SERPL CALCULATED.3IONS-SCNC: 15 MMOL/L (ref 7–16)
AST SERPL-CCNC: 24 U/L (ref 0–31)
BASOPHILS # BLD: 0.06 K/UL (ref 0–0.2)
BASOPHILS NFR BLD: 1 % (ref 0–2)
BILIRUB SERPL-MCNC: 0.5 MG/DL (ref 0–1.2)
BNP SERPL-MCNC: ABNORMAL PG/ML (ref 0–450)
BUN SERPL-MCNC: 20 MG/DL (ref 6–23)
CALCIUM SERPL-MCNC: 8.4 MG/DL (ref 8.6–10.2)
CHLORIDE SERPL-SCNC: 105 MMOL/L (ref 98–107)
CO2 SERPL-SCNC: 21 MMOL/L (ref 22–29)
CREAT SERPL-MCNC: 1.3 MG/DL (ref 0.5–1)
EOSINOPHIL # BLD: 0.15 K/UL (ref 0.05–0.5)
EOSINOPHILS RELATIVE PERCENT: 3 % (ref 0–6)
ERYTHROCYTE [DISTWIDTH] IN BLOOD BY AUTOMATED COUNT: 13.4 % (ref 11.5–15)
GFR, ESTIMATED: 41 ML/MIN/1.73M2
GLUCOSE BLD-MCNC: 186 MG/DL (ref 74–99)
GLUCOSE SERPL-MCNC: 162 MG/DL (ref 74–99)
HCT VFR BLD AUTO: 33.2 % (ref 34–48)
HGB BLD-MCNC: 10.3 G/DL (ref 11.5–15.5)
IMM GRANULOCYTES # BLD AUTO: 0.03 K/UL (ref 0–0.58)
IMM GRANULOCYTES NFR BLD: 1 % (ref 0–5)
LACTATE BLDV-SCNC: 1 MMOL/L (ref 0.5–1.9)
LACTATE BLDV-SCNC: 2.4 MMOL/L (ref 0.5–1.9)
LYMPHOCYTES NFR BLD: 0.67 K/UL (ref 1.5–4)
LYMPHOCYTES RELATIVE PERCENT: 13 % (ref 20–42)
MAGNESIUM SERPL-MCNC: 1.8 MG/DL (ref 1.6–2.6)
MCH RBC QN AUTO: 31.6 PG (ref 26–35)
MCHC RBC AUTO-ENTMCNC: 31 G/DL (ref 32–34.5)
MCV RBC AUTO: 101.8 FL (ref 80–99.9)
MONOCYTES NFR BLD: 0.41 K/UL (ref 0.1–0.95)
MONOCYTES NFR BLD: 8 % (ref 2–12)
NEUTROPHILS NFR BLD: 75 % (ref 43–80)
NEUTS SEG NFR BLD: 3.87 K/UL (ref 1.8–7.3)
PLATELET # BLD AUTO: 115 K/UL (ref 130–450)
PMV BLD AUTO: 9.9 FL (ref 7–12)
POTASSIUM SERPL-SCNC: 4.4 MMOL/L (ref 3.5–5)
PROT SERPL-MCNC: 5.6 G/DL (ref 6.4–8.3)
RBC # BLD AUTO: 3.26 M/UL (ref 3.5–5.5)
SODIUM SERPL-SCNC: 141 MMOL/L (ref 132–146)
TROPONIN I SERPL HS-MCNC: 58 NG/L (ref 0–9)
TROPONIN I SERPL HS-MCNC: 59 NG/L (ref 0–9)
WBC OTHER # BLD: 5.2 K/UL (ref 4.5–11.5)

## 2025-03-25 PROCEDURE — 96361 HYDRATE IV INFUSION ADD-ON: CPT

## 2025-03-25 PROCEDURE — G0378 HOSPITAL OBSERVATION PER HR: HCPCS

## 2025-03-25 PROCEDURE — 2580000003 HC RX 258

## 2025-03-25 PROCEDURE — 84484 ASSAY OF TROPONIN QUANT: CPT

## 2025-03-25 PROCEDURE — 93005 ELECTROCARDIOGRAM TRACING: CPT

## 2025-03-25 PROCEDURE — 85025 COMPLETE CBC W/AUTO DIFF WBC: CPT

## 2025-03-25 PROCEDURE — 96360 HYDRATION IV INFUSION INIT: CPT

## 2025-03-25 PROCEDURE — 93280 PM DEVICE PROGR EVAL DUAL: CPT | Performed by: INTERNAL MEDICINE

## 2025-03-25 PROCEDURE — 83880 ASSAY OF NATRIURETIC PEPTIDE: CPT

## 2025-03-25 PROCEDURE — 70450 CT HEAD/BRAIN W/O DYE: CPT

## 2025-03-25 PROCEDURE — 99285 EMERGENCY DEPT VISIT HI MDM: CPT

## 2025-03-25 PROCEDURE — 83735 ASSAY OF MAGNESIUM: CPT

## 2025-03-25 PROCEDURE — 71045 X-RAY EXAM CHEST 1 VIEW: CPT

## 2025-03-25 PROCEDURE — 99223 1ST HOSP IP/OBS HIGH 75: CPT | Performed by: INTERNAL MEDICINE

## 2025-03-25 PROCEDURE — 82962 GLUCOSE BLOOD TEST: CPT

## 2025-03-25 PROCEDURE — 80053 COMPREHEN METABOLIC PANEL: CPT

## 2025-03-25 PROCEDURE — 83605 ASSAY OF LACTIC ACID: CPT

## 2025-03-25 PROCEDURE — 87040 BLOOD CULTURE FOR BACTERIA: CPT

## 2025-03-25 RX ORDER — POLYETHYLENE GLYCOL 3350 17 G/17G
17 POWDER, FOR SOLUTION ORAL DAILY PRN
Status: CANCELLED | OUTPATIENT
Start: 2025-03-25

## 2025-03-25 RX ORDER — 0.9 % SODIUM CHLORIDE 0.9 %
500 INTRAVENOUS SOLUTION INTRAVENOUS ONCE
Status: COMPLETED | OUTPATIENT
Start: 2025-03-25 | End: 2025-03-25

## 2025-03-25 RX ADMIN — SODIUM CHLORIDE 500 ML: 0.9 INJECTION, SOLUTION INTRAVENOUS at 18:14

## 2025-03-25 NOTE — ED PROVIDER NOTES
[facility] EMERGENCY DEPARTMENT  EMERGENCY DEPARTMENT ENCOUNTER        Pt Name: Julita Madrid  MRN: [id number]  Birthdate 1945  Date of evaluation: 3/25/2025  Provider: Elliot Weinberg MD  Attending Provider: Eduarda Barajas MD  PCP: Anjel Duran DO  Note Started: 3:09 PM EDT 3/25/25    CHIEF COMPLAINT       Chief Complaint   Patient presents with    Loss of Consciousness     Pt sitting with son and pt had a syncopal episode for approx  3 minutes. Pt with recent pacemaker, per EMS heart rate in 20s, irregular.        HISTORY OF PRESENT ILLNESS: 1 or more Elements   History From: EMS report and the patient        Julita Madrid is a 79 y.o. female with a past medical history of hypertension, chronic kidney disease, congestive heart failure, recent pacemaker placement presenting with syncopal event.  Patient was sitting with her son on the couch when she had a sudden onset syncopal event.  Per EMS report, patient was out for approximately 3 minutes.  No body jerking or seizure-like activity was noted.  Patient was on the couch and did not fall traumatically.  Per EMS, patient's blood sugar was 202.  She was noted to be bradycardic.  They state that patient's blood pressure was stable.  She was otherwise mentating fine.  She was alert and oriented x 4.  Also recently had a left hip replacement surgery    Nursing Notes were all reviewed and agreed with or any disagreements were addressed in the HPI.      REVIEW OF EXTERNAL NOTE :           PDMP Monitoring:    Last PDMP Kamlesh as Reviewed:  Review User Review Instant Review Result            Urine Drug Screenings (1 yr)    No resulted procedures found.       Medication Contract and Consent for Opioid Use Documents Filed        No documents found                      REVIEW OF SYSTEMS :           Positives and Pertinent negatives as per HPI.     SURGICAL HISTORY     Past Surgical History:   Procedure Laterality Date    CHOLECYSTECTOMY

## 2025-03-25 NOTE — CONSULTS
[facility] [place] [place] [facility]rophysiology  Inpatient Consult  Report  PATIENT: Julita Madrid  MEDICAL RECORD NUMBER: [id number]  DATE OF SERVICE:  3/25/2025  ATTENDING ELECTROPHYSIOLOGIST: Lani Mora MD   PRIMARY ELECTROPHYSIOLOGIST: Nighat Beauchamp MD   REFERRING PHYSICIAN:  Anjel Duran DO  CHIEF COMPLAINT: Near syncope/Lightheadedness.     HPI: This is a 79 y.o. female with a history of Second and third-degree AV block, sinus node dysfunction discovered on a March 2025 with subsequent placement of a dual chamber pacemaker on 03/17/2025, Hypertension, esophageal stricture S/p EGD and balloon dilation. She was seen by Dr. Beauchamp during a her admission on 02/21/2025 when she had syncope/fall with associated left hip fracture at that time she was noted to have progressive weakness. Post ORIF she was noted to have bradycardia and a Zio monitor was placed at the time of discharge. This Zio Xt monitor showed episodes of third-degree AV block, secondary degree AV block and pauses upto 5.5 sec. She was admitted for pacemaker placement on 03/15/2025. On 03/17/2025 she underwent placement of a Dual chamber [employer] PPM. The following day he device function was normal as was the CXR and she was discharged.   Post discharge the patient returned home where she was in the care of her son, her home medication include Bumex 1mg daily, and Diovan 80mg daily. Today (03/25/2025) she awoke around 11AM and took her morning medication. When sitting on the couch around 130 PM watching TV when she noted the lights of the TV and window were bright she then felt very weak and tiered with associated lightheadedness without loss of consciousness yet was unable to speak. EMS was called and intailly reported bradycardia with heart rates in the 40's. She was brought to [facility] where on the monitor she is A-V paced at 60 bpm without any bradycardia or evidence of pacemaker malfunction additionally  longest lasting 5.4 secs (11 bpm). 199 episode(s) of AV Block (3rd°) occurred, lasting a total of 44 mins 59 secs. Second Degree AV Block-Mobitz I (Wenckebach) was present. Evidence for sinus node dysfunction. No patient triggered events Electronically signed by Dr.Mita Beauchamp on 03/14/25 07:04 PM ET    Device Interrogation 3/25/25:  Make/Model STJ Assurity*  Mode DDDR 60/120  P wave: 1.8 mV  Impedance: 330 ohms   Threshold: 2.25 V @ 0.5 ms  RV R wave: Paced  Impedance: 460 ohms   Threshold: 0.75 V @ 0.5 ms  Pacing: A: 53%  RV: 96%   Battery Voltage/Longevity:  4.0 years     Arrhythmias: 11 min of AFL 03/19/2025, Increased RA threshold from 0.5 V @ 0.5 to 2.25 @ 0.5 ms  bi poloar and 1.75mV @ 0.5ms Unipolar  Reprogramming included Increased RA output   Overall device function is normal    All device programmable settings were evaluated per above and in the scanned document, along with iterative adjustments (capture thresholds) to assess and select the most appropriate final programming to provide for consistent delivery of the appropriate therapy and to verify function of the device.     Assessment/Plan:    1. Episode of near syncope   - No evidence of arrhythmia or pacemaker malfunction.   - Cannot rule out hypotension.     2. Pacemaker in situ   - DOI: 3/17/25.  - [employer]; dual chamber.  - Normal device function.     3. Second and third degree AV block  - Seen on Zio monitor 03/14/2025  - Prior episode of syncope with associated hip fracture.   - Status post pacemaker implantation    4. Sinus Node Dysfunction   - 5.4 sec pause on Zio monitor.   - Status post pacemaker implantation    5. Hypertension   - On Bumex and Diovan.     6. Chronic HFpEF   - On Bumex   - Compensated.     Recommendations:  1. Normal pacemaker function.   2. Check orthostatic BP and consider discontinue Bumex if is positive.   3. Follow up with Dr. Beauchamp as an outpatient.  4. EP will sign off. Please call for any questions or concerns.    I

## 2025-03-26 DIAGNOSIS — I44.1 SECOND DEGREE ATRIOVENTRICULAR BLOCK, MOBITZ (TYPE) I: ICD-10-CM

## 2025-03-26 LAB
ANION GAP SERPL CALCULATED.3IONS-SCNC: 13 MMOL/L (ref 7–16)
BASOPHILS # BLD: 0.03 K/UL (ref 0–0.2)
BASOPHILS NFR BLD: 1 % (ref 0–2)
BUN SERPL-MCNC: 20 MG/DL (ref 6–23)
CALCIUM SERPL-MCNC: 8.4 MG/DL (ref 8.6–10.2)
CHLORIDE SERPL-SCNC: 106 MMOL/L (ref 98–107)
CO2 SERPL-SCNC: 24 MMOL/L (ref 22–29)
CREAT SERPL-MCNC: 1.3 MG/DL (ref 0.5–1)
EOSINOPHIL # BLD: 0.15 K/UL (ref 0.05–0.5)
EOSINOPHILS RELATIVE PERCENT: 3 % (ref 0–6)
ERYTHROCYTE [DISTWIDTH] IN BLOOD BY AUTOMATED COUNT: 13.4 % (ref 11.5–15)
GFR, ESTIMATED: 43 ML/MIN/1.73M2
GLUCOSE SERPL-MCNC: 98 MG/DL (ref 74–99)
HCT VFR BLD AUTO: 31.5 % (ref 34–48)
HGB BLD-MCNC: 9.7 G/DL (ref 11.5–15.5)
IMM GRANULOCYTES # BLD AUTO: <0.03 K/UL (ref 0–0.58)
IMM GRANULOCYTES NFR BLD: 0 % (ref 0–5)
LYMPHOCYTES NFR BLD: 1.08 K/UL (ref 1.5–4)
LYMPHOCYTES RELATIVE PERCENT: 24 % (ref 20–42)
MCH RBC QN AUTO: 31.6 PG (ref 26–35)
MCHC RBC AUTO-ENTMCNC: 30.8 G/DL (ref 32–34.5)
MCV RBC AUTO: 102.6 FL (ref 80–99.9)
MONOCYTES NFR BLD: 0.53 K/UL (ref 0.1–0.95)
MONOCYTES NFR BLD: 12 % (ref 2–12)
NEUTROPHILS NFR BLD: 60 % (ref 43–80)
NEUTS SEG NFR BLD: 2.75 K/UL (ref 1.8–7.3)
PLATELET # BLD AUTO: 128 K/UL (ref 130–450)
PMV BLD AUTO: 10.5 FL (ref 7–12)
POTASSIUM SERPL-SCNC: 4.2 MMOL/L (ref 3.5–5)
RBC # BLD AUTO: 3.07 M/UL (ref 3.5–5.5)
SODIUM SERPL-SCNC: 143 MMOL/L (ref 132–146)
TROPONIN I SERPL HS-MCNC: 68 NG/L (ref 0–9)
WBC OTHER # BLD: 4.6 K/UL (ref 4.5–11.5)

## 2025-03-26 PROCEDURE — G0378 HOSPITAL OBSERVATION PER HR: HCPCS

## 2025-03-26 PROCEDURE — 2580000003 HC RX 258: Performed by: NURSE PRACTITIONER

## 2025-03-26 PROCEDURE — 85025 COMPLETE CBC W/AUTO DIFF WBC: CPT

## 2025-03-26 PROCEDURE — 97161 PT EVAL LOW COMPLEX 20 MIN: CPT

## 2025-03-26 PROCEDURE — 6370000000 HC RX 637 (ALT 250 FOR IP): Performed by: NURSE PRACTITIONER

## 2025-03-26 PROCEDURE — 84484 ASSAY OF TROPONIN QUANT: CPT

## 2025-03-26 PROCEDURE — 36415 COLL VENOUS BLD VENIPUNCTURE: CPT

## 2025-03-26 PROCEDURE — 97530 THERAPEUTIC ACTIVITIES: CPT

## 2025-03-26 PROCEDURE — 80048 BASIC METABOLIC PNL TOTAL CA: CPT

## 2025-03-26 PROCEDURE — 2500000003 HC RX 250 WO HCPCS: Performed by: NURSE PRACTITIONER

## 2025-03-26 RX ORDER — SODIUM CHLORIDE 0.9 % (FLUSH) 0.9 %
5-40 SYRINGE (ML) INJECTION EVERY 12 HOURS SCHEDULED
Status: DISCONTINUED | OUTPATIENT
Start: 2025-03-26 | End: 2025-03-29 | Stop reason: HOSPADM

## 2025-03-26 RX ORDER — ONDANSETRON 2 MG/ML
4 INJECTION INTRAMUSCULAR; INTRAVENOUS EVERY 6 HOURS PRN
Status: DISCONTINUED | OUTPATIENT
Start: 2025-03-26 | End: 2025-03-29 | Stop reason: HOSPADM

## 2025-03-26 RX ORDER — SODIUM CHLORIDE 0.9 % (FLUSH) 0.9 %
5-40 SYRINGE (ML) INJECTION PRN
Status: DISCONTINUED | OUTPATIENT
Start: 2025-03-26 | End: 2025-03-29 | Stop reason: HOSPADM

## 2025-03-26 RX ORDER — SODIUM CHLORIDE 9 MG/ML
INJECTION, SOLUTION INTRAVENOUS PRN
Status: DISCONTINUED | OUTPATIENT
Start: 2025-03-26 | End: 2025-03-29 | Stop reason: HOSPADM

## 2025-03-26 RX ORDER — BISACODYL 5 MG/1
5 TABLET, DELAYED RELEASE ORAL DAILY PRN
Status: DISCONTINUED | OUTPATIENT
Start: 2025-03-26 | End: 2025-03-29 | Stop reason: HOSPADM

## 2025-03-26 RX ORDER — ASPIRIN 81 MG/1
81 TABLET ORAL 2 TIMES DAILY
Status: DISCONTINUED | OUTPATIENT
Start: 2025-03-26 | End: 2025-03-26

## 2025-03-26 RX ORDER — VALSARTAN 80 MG/1
80 TABLET ORAL DAILY
Status: DISCONTINUED | OUTPATIENT
Start: 2025-03-26 | End: 2025-03-29 | Stop reason: HOSPADM

## 2025-03-26 RX ORDER — BUMETANIDE 1 MG/1
1 TABLET ORAL DAILY
Status: DISCONTINUED | OUTPATIENT
Start: 2025-03-26 | End: 2025-03-29 | Stop reason: HOSPADM

## 2025-03-26 RX ORDER — ONDANSETRON 4 MG/1
4 TABLET, ORALLY DISINTEGRATING ORAL EVERY 8 HOURS PRN
Status: DISCONTINUED | OUTPATIENT
Start: 2025-03-26 | End: 2025-03-29 | Stop reason: HOSPADM

## 2025-03-26 RX ORDER — SODIUM CHLORIDE 9 MG/ML
INJECTION, SOLUTION INTRAVENOUS CONTINUOUS
Status: DISCONTINUED | OUTPATIENT
Start: 2025-03-26 | End: 2025-03-27

## 2025-03-26 RX ORDER — ASPIRIN 81 MG/1
81 TABLET ORAL 2 TIMES DAILY
Status: COMPLETED | OUTPATIENT
Start: 2025-03-26 | End: 2025-03-27

## 2025-03-26 RX ORDER — POTASSIUM CHLORIDE 1500 MG/1
20 TABLET, EXTENDED RELEASE ORAL DAILY
Status: DISCONTINUED | OUTPATIENT
Start: 2025-03-26 | End: 2025-03-29 | Stop reason: HOSPADM

## 2025-03-26 RX ORDER — HEPARIN SODIUM 5000 [USP'U]/ML
5000 INJECTION, SOLUTION INTRAVENOUS; SUBCUTANEOUS 2 TIMES DAILY
Status: DISCONTINUED | OUTPATIENT
Start: 2025-03-26 | End: 2025-03-26

## 2025-03-26 RX ORDER — HEPARIN SODIUM 5000 [USP'U]/ML
5000 INJECTION, SOLUTION INTRAVENOUS; SUBCUTANEOUS 2 TIMES DAILY
Status: DISCONTINUED | OUTPATIENT
Start: 2025-03-28 | End: 2025-03-29 | Stop reason: HOSPADM

## 2025-03-26 RX ADMIN — ASPIRIN 81 MG: 81 TABLET, COATED ORAL at 21:55

## 2025-03-26 RX ADMIN — ASPIRIN 81 MG: 81 TABLET, COATED ORAL at 12:00

## 2025-03-26 RX ADMIN — POTASSIUM CHLORIDE 20 MEQ: 1500 TABLET, EXTENDED RELEASE ORAL at 12:00

## 2025-03-26 RX ADMIN — SODIUM CHLORIDE, PRESERVATIVE FREE 10 ML: 5 INJECTION INTRAVENOUS at 21:56

## 2025-03-26 RX ADMIN — SODIUM CHLORIDE, PRESERVATIVE FREE 10 ML: 5 INJECTION INTRAVENOUS at 12:01

## 2025-03-26 RX ADMIN — BUMETANIDE 1 MG: 1 TABLET ORAL at 12:00

## 2025-03-26 RX ADMIN — SODIUM CHLORIDE: 0.9 INJECTION, SOLUTION INTRAVENOUS at 03:47

## 2025-03-26 ASSESSMENT — PAIN SCALES - GENERAL
PAINLEVEL_OUTOF10: 0
PAINLEVEL_OUTOF10: 0

## 2025-03-26 NOTE — PROGRESS NOTES
4 Eyes Skin Assessment     NAME:  Julita Madrid  YOB: 1945  MEDICAL RECORD NUMBER:  [id number]    The patient is being assessed for  Admission    I agree that at least one RN has performed a thorough Head to Toe Skin Assessment on the patient. ALL assessment sites listed below have been assessed.      Areas assessed by both nurses:    Head, Face, Ears, Shoulders, Back, Chest, Arms, Elbows, Hands, Sacrum. Buttock, Coccyx, Ischium, Legs. Feet and Heels, and Under Medical Devices         Does the Patient have a Wound? Yes wound(s) were present on assessment. LDA wound assessment was Initiated and completed by RN       Preston Prevention initiated by RN: Yes  Wound Care Orders initiated by RN: No    Pressure Injury (Stage 3,4, Unstageable, DTI, NWPT, and Complex wounds) if present, place Wound referral order by RN under : No    New Ostomies, if present place, Ostomy referral order under : No     Nurse 1 eSignature: Electronically signed by Cori Escalera RN on 3/26/25 at 2:30 PM EDT    **SHARE this note so that the co-signing nurse can place an eSignature**    Nurse 2 eSignature: Electronically signed by Bruce Grande RN on 3/26/25 at 2:46 PM EDT

## 2025-03-26 NOTE — PROGRESS NOTES
Physical Therapy  Initial Assessment     Name: Julita Madrid  : 1945  MRN: [id number]      Date of Service: 3/26/2025    Evaluating PT: Jason Herzog, PT, DPT [id number]      Room #:  8202/8202-B  Diagnosis:  Syncope, unspecified syncope type [R55]  PMHx/PSHx:   has a past medical history of Bradycardia, Common bile duct dilation, GERD (gastroesophageal reflux disease), Hypertension, and Weight loss, unintentional.  Pertinent Information: Left hip hemiarthroplasty , Pacemaker placement 3/17  Precautions:  Fall risk, pacemaker precautions, WBAT LLE, Anterolateral hip precautions, alarms, + orthostatics  Equipment Needs:  TBD    SUBJECTIVE:    Pt lives with her  in a 1 story home with 2 stair(s) and 1 rail(s) to enter. Pt ambulated with FWW prior to admission. Son present at time of evaluation and reports he checks in on her regularly.    OBJECTIVE:   Initial Evaluation  Date: 3/26/25 Treatment Date: Short Term/ Long Term   Goals   AM-PAC 6 Clicks      Was pt agreeable to Eval/treatment? Yes     Does pt have pain? Denies pain     Bed Mobility  Rolling: NT  Supine to sit: ModA  Sit to supine: ModA  Scooting: ModA  Rolling: SBA  Supine to sit: SBA  Sit to supine: SBA  Scooting: SBA   Transfers Sit to stand: Pablo  Stand to sit: Pablo  Stand pivot: NT d/t positive orthostatics and pt being symptomatic    Sit to stand: Supervision  Stand to sit: Supervision  Stand pivot: Supervision with FWW   Ambulation   NT d/t positive orthostatics and pt being symptomatic  >50 feet with AAD with SBA   Stair negotiation: ascended and descended NT  2 step(s) with 1 rail(s) with SBA   ROM BUE: Refer to OT note  BLE: WFL     Strength BUE: Refer to OT note  BLE: NT      Balance Sitting EOB: Pablo  Dynamic Standing: NT d/t + orthostatics  Sitting EOB: Supervision  Dynamic Standing: SBA with AAD     Pt is A & O x 4.   Sensation: Denies numbness and tingling of extremities.  Edema: Unremarkable    Orthostatics  Position BP

## 2025-03-26 NOTE — FLOWSHEET NOTE
03/26/25 1500   Vital Signs   Orthostatic B/P and Pulse? Yes   Blood Pressure Lying 165/67   Pulse Lying 66 PER MINUTE   Blood Pressure Sitting 155/87   Pulse Sitting 76 PER MINUTE   Blood Pressure Standing 96/59   Pulse Standing 76 PER MINUTE

## 2025-03-26 NOTE — ED NOTES
Pt unsure of medications at this time and states when son gets here he can provide medication info

## 2025-03-26 NOTE — H&P
[facility]  History and Physical      CHIEF COMPLAINT:    Chief Complaint   Patient presents with    Loss of Consciousness     Pt sitting with son and pt had a syncopal episode for approx  3 minutes. Pt with recent pacemaker, per EMS heart rate in 20s, irregular.         Patient of Anjel Duran DO presents with:  Pre-syncope    History of Present Illness:   Ms. Madrid is an 79 year old  female with a past medical history of second and third-degree AV block, syncope with resultant hip fracture, sinus node dysfunction s/p pacemaker insertion 03/17/2025, chronic HFpEF,  bicuspid aortic valve with mild AS on TTE 03/2024, HTN, CKD (baseline SCr 1-1.1), GERD, esophageal stricture s/p dilatation, chronic back pain.  Ms. Madrid presented to [facility] ED on 03/25/2025 with complaints of syncopal episode.  Prior to presentation she was sitting with her son and had a reported syncopal episode lasting approximately 3 minutes in duration.  Upon EMS arrival reportedly her heart rate was irregular and in the 20s.  Per EMS report her blood glucose was 202.  Upon arrival to the ED her VS were oral temperature 97.6-43-% RA-151/53.  H&H 10.3/33.2.  Lactic acid 2.4.  CT of the head unremarkable.  CXR with no acute process, suspicion for mild cardiomegaly.  EKG with paced rhythm.  During her ED course electrophysiology evaluated the patient and reviewed the patient's pacemaker interrogation with reported pacemaker functioning as programmed.  She received a 500 cc bolus.  On evaluation she is comfortable in no acute distress.  She tells me she passed out, nearly passed out at home.  She is able to answer all questions appropriately denies any chest heaviness or discomfort.    REVIEW OF SYSTEMS:  Pertinent negatives are above in HPI.  10 point ROS otherwise negative.      Past Medical History:   Diagnosis Date    Bradycardia 3/6/2017    Common bile duct dilation     GERD (gastroesophageal reflux disease)

## 2025-03-26 NOTE — FLOWSHEET NOTE
03/26/25 1400   Vital Signs   Orthostatic B/P and Pulse? Yes   Blood Pressure Lying 164/74   Pulse Lying 81 PER MINUTE   Blood Pressure Sitting 172/74   Pulse Sitting 79 PER MINUTE   Blood Pressure Standing 86/37   Pulse Standing 120 PER MINUTE

## 2025-03-27 LAB
ANION GAP SERPL CALCULATED.3IONS-SCNC: 14 MMOL/L (ref 7–16)
BASOPHILS # BLD: 0.05 K/UL (ref 0–0.2)
BASOPHILS NFR BLD: 1 % (ref 0–2)
BUN SERPL-MCNC: 17 MG/DL (ref 6–23)
CALCIUM SERPL-MCNC: 8.4 MG/DL (ref 8.6–10.2)
CHLORIDE SERPL-SCNC: 104 MMOL/L (ref 98–107)
CO2 SERPL-SCNC: 24 MMOL/L (ref 22–29)
CREAT SERPL-MCNC: 1.2 MG/DL (ref 0.5–1)
EOSINOPHIL # BLD: 0.31 K/UL (ref 0.05–0.5)
EOSINOPHILS RELATIVE PERCENT: 7 % (ref 0–6)
ERYTHROCYTE [DISTWIDTH] IN BLOOD BY AUTOMATED COUNT: 13.3 % (ref 11.5–15)
GFR, ESTIMATED: 47 ML/MIN/1.73M2
GLUCOSE SERPL-MCNC: 94 MG/DL (ref 74–99)
HCT VFR BLD AUTO: 32.5 % (ref 34–48)
HGB BLD-MCNC: 10.3 G/DL (ref 11.5–15.5)
IMM GRANULOCYTES # BLD AUTO: <0.03 K/UL (ref 0–0.58)
IMM GRANULOCYTES NFR BLD: 0 % (ref 0–5)
LYMPHOCYTES NFR BLD: 1.21 K/UL (ref 1.5–4)
LYMPHOCYTES RELATIVE PERCENT: 26 % (ref 20–42)
MCH RBC QN AUTO: 31.7 PG (ref 26–35)
MCHC RBC AUTO-ENTMCNC: 31.7 G/DL (ref 32–34.5)
MCV RBC AUTO: 100 FL (ref 80–99.9)
MONOCYTES NFR BLD: 0.52 K/UL (ref 0.1–0.95)
MONOCYTES NFR BLD: 11 % (ref 2–12)
NEUTROPHILS NFR BLD: 55 % (ref 43–80)
NEUTS SEG NFR BLD: 2.54 K/UL (ref 1.8–7.3)
PLATELET # BLD AUTO: 136 K/UL (ref 130–450)
PMV BLD AUTO: 10.1 FL (ref 7–12)
POTASSIUM SERPL-SCNC: 4.1 MMOL/L (ref 3.5–5)
RBC # BLD AUTO: 3.25 M/UL (ref 3.5–5.5)
SODIUM SERPL-SCNC: 142 MMOL/L (ref 132–146)
WBC OTHER # BLD: 4.7 K/UL (ref 4.5–11.5)

## 2025-03-27 PROCEDURE — 97166 OT EVAL MOD COMPLEX 45 MIN: CPT

## 2025-03-27 PROCEDURE — 2500000003 HC RX 250 WO HCPCS: Performed by: NURSE PRACTITIONER

## 2025-03-27 PROCEDURE — 6370000000 HC RX 637 (ALT 250 FOR IP): Performed by: NURSE PRACTITIONER

## 2025-03-27 PROCEDURE — 36415 COLL VENOUS BLD VENIPUNCTURE: CPT

## 2025-03-27 PROCEDURE — G0378 HOSPITAL OBSERVATION PER HR: HCPCS

## 2025-03-27 PROCEDURE — 85025 COMPLETE CBC W/AUTO DIFF WBC: CPT

## 2025-03-27 PROCEDURE — 80048 BASIC METABOLIC PNL TOTAL CA: CPT

## 2025-03-27 PROCEDURE — 97530 THERAPEUTIC ACTIVITIES: CPT

## 2025-03-27 RX ADMIN — SODIUM CHLORIDE, PRESERVATIVE FREE 10 ML: 5 INJECTION INTRAVENOUS at 08:16

## 2025-03-27 RX ADMIN — ASPIRIN 81 MG: 81 TABLET, COATED ORAL at 08:15

## 2025-03-27 RX ADMIN — SODIUM CHLORIDE, PRESERVATIVE FREE 10 ML: 5 INJECTION INTRAVENOUS at 20:09

## 2025-03-27 RX ADMIN — BUMETANIDE 1 MG: 1 TABLET ORAL at 08:15

## 2025-03-27 RX ADMIN — ASPIRIN 81 MG: 81 TABLET, COATED ORAL at 20:08

## 2025-03-27 RX ADMIN — POTASSIUM CHLORIDE 20 MEQ: 1500 TABLET, EXTENDED RELEASE ORAL at 08:16

## 2025-03-27 ASSESSMENT — PAIN SCALES - GENERAL
PAINLEVEL_OUTOF10: 0
PAINLEVEL_OUTOF10: 0

## 2025-03-27 NOTE — PROGRESS NOTES
[facility]                                Progress note    Subjective:  Denies chest pain and dyspnea  Denies dizziness and lightheadedness with change of position    Objective:  Sitting up in bed, conversing without  difficulty  No acute distress  No family present at the bedside  BP (!) 135/58   Pulse 73   Temp 98.4 °F (36.9 °C) (Temporal)   Resp 18   Ht 1.6 m (5' 3\")   Wt 51.4 kg (113 lb 5.1 oz)   SpO2 99%   BMI 20.07 kg/m²   CONST:  Well developed, well nourished elderly  female who appears stated age. Awake, alert, cooperative, no apparent distress  HEENT:   Head- Normocephalic, atraumatic   Eyes- Conjunctivae pink, anicteric  Throat- Oral mucosa pink and moist  Neck-  No stridor, trachea midline, no jugular venous distention. No adenopathy   CHEST: Chest symmetrical and non-tender to palpation. No accessory muscle use or intercostal retractions  RESPIRATORY: Lung sounds - clear throughout fields   CARDIOVASCULAR:     No carotid bruit  Heart Inspection- shows no noted pulsations  Heart Palpation- no heaves or thrills; PMI is non-displaced   Heart Ausculation- Regular rate and rhythm, no murmur. No s3, s4 or rub   PV: No lower extremity edema. No varicosities. Pedal pulses palpable, no clubbing or cyanosis   ABDOMEN: Soft, non-tender to light palpation. Bowel sounds present. No palpable masses no organomegaly; no abdominal bruit  MS: Good muscle strength and tone. No atrophy or abnormal movements.   : Deferred  SKIN: Warm and dry no statis dermatitis or ulcers   NEURO / PSYCH: Oriented to person, place and time. Speech clear and appropriate. Follows all commands. Pleasant affect      Recent Labs     03/25/25  1515 03/26/25  0646 03/27/25  0504   WBC 5.2 4.6 4.7   HGB 10.3* 9.7* 10.3*   HCT 33.2* 31.5* 32.5*   * 128* 136       Recent Labs     03/25/25  1517 03/26/25  0646 03/27/25  0504    143 142   K 4.4 4.2 4.1    106 104   CO2 21* 24 24   BUN 20 20 17

## 2025-03-27 NOTE — CARE COORDINATION
03/27/25 CANDICE BAXTER  Pt in observation. Came to ED for syncope.  Pt having orthostatic blood pressures. Pt lives at home w/  in 1 story home w/ 4 steps to enter w/ hand rail. Pt is independent in ADL's for herself.  Son helps with cooking, cleaning, laundry etc. Pt utilizes a FWW usually but has a quad cane, shower bench and WC.  Son will transport home for DC.  PCP is Dr. Anjel Combs Pharmacy is [facility]. Corry Nixon RN CM    Case Management Assessment  Initial Evaluation    Date/Time of Evaluation: 3/27/2025 2:31 PM  Assessment Completed by: Corry Nixon RN    If patient is discharged prior to next notation, then this note serves as note for discharge by case management.    Patient Name: Julita Madrid                   YOB: 1945  Diagnosis: Syncope, unspecified syncope type [R55]                   Date / Time: 3/25/2025  3:00 PM    Patient Admission Status: Observation   Readmission Risk (Low < 19, Mod (19-27), High > 27): Readmission Risk Score: 17.3    Current PCP: Anjel Duran, DO  PCP verified by CM?      Chart Reviewed: Yes      History Provided by:    Patient Orientation:      Patient Cognition:      Hospitalization in the last 30 days (Readmission):  Yes    If yes, Readmission Assessment in  Navigator will be completed.    Advance Directives:      Code Status: Full Code   Patient's Primary Decision Maker is:      Primary Decision Maker: Anson Madrid - Spouse - [phone]    Secondary Decision Maker: MadridAnson - Child - [phone]    Discharge Planning:    Patient lives with: Spouse/Significant Other Type of Home: House  Primary Care Giver:    Patient Support Systems include: Spouse/Significant Other, Family Members   Current Financial resources:    Current community resources:    Current services prior to admission: Home Care            Current DME:              Type of Home Care services:  OT, PT, Aide Services    ADLS  Prior functional level:    Current functional

## 2025-03-27 NOTE — PROGRESS NOTES
OCCUPATIONAL THERAPY INITIAL EVALUATION    [facility] [address] [place], [place]       Date:3/27/2025                                                  Patient Name: Julita Madrid  MRN: [id number]  : 1945  Room: [address] [place] [address]    Evaluating OT: Leo Farias OTR/L [id number]    Referring Provider:     Saurabh April, APRARLEEN - CNP        Specific Provider Orders/Date: OT evaluation and treatment 3/26/25 0100    Diagnosis:  Syncope, unspecified syncope type [R55]      Pertinent Medical History:  has a past medical history of Bradycardia, Common bile duct dilation, GERD (gastroesophageal reflux disease), Hypertension, and Weight loss, unintentional.   Past Surgical History:   Procedure Laterality Date    CHOLECYSTECTOMY      COLONOSCOPY      ENDOSCOPY, COLON, DIAGNOSTIC      EP DEVICE PROCEDURE N/A 3/17/2025    Insert PPM dual performed by Nighat Beauchamp MD at [facility] CARDIAC CATH LAB    ERCP  2015    ERCP  2015    with stent removal    HIP SURGERY Left 2025    LEFT HIP HEMIARTHROPLASTY performed by Jesús Schroeder DO at [facility] OR    UPPER GASTROINTESTINAL ENDOSCOPY  2017    Dr CASIMIRO Ward       Pt presented to the hospital on 3.25 with LOC   Pertinent Information: Left hip hemiarthroplasty , Pacemaker placement 3/17     Orders received, chart reviewed, eval complete.     Precautions:  Fall Risk, orthostatic, WBAT LLE, anterolateral hip precautions, pacemaker precautions     Assessment of current deficits   [x] Functional mobility   [x]ADLs  [x] Strength               []Cognition   [x] Functional transfers   [x] IADLs         [x] Safety Awareness   [x]Endurance   [] Fine Motor Coordination [x] Balance [] Vision/perception   []Sensation    [] Gross Motor Coordination [] ROM  [] Delirium                  [] Motor Control     OT PLAN OF CARE   OT POC based on physician orders, patient diagnosis and results of clinical

## 2025-03-27 NOTE — PLAN OF CARE
Problem: Chronic Conditions and Co-morbidities  Goal: Patient's chronic conditions and co-morbidity symptoms are monitored and maintained or improved  3/27/2025 0107 by Gill Greco RN  Outcome: Progressing  3/26/2025 1346 by Cori Escalera RN  Outcome: Progressing     Problem: Skin/Tissue Integrity  Goal: Skin integrity remains intact  Description: 1.  Monitor for areas of redness and/or skin breakdown  2.  Assess vascular access sites hourly  3.  Every 4-6 hours minimum:  Change oxygen saturation probe site  4.  Every 4-6 hours:  If on nasal continuous positive airway pressure, respiratory therapy assess nares and determine need for appliance change or resting period  3/27/2025 0107 by Gill Greco RN  Outcome: Progressing  3/26/2025 1346 by Cori Escalera RN  Outcome: Progressing  Flowsheets (Taken 3/26/2025 1346)  Skin Integrity Remains Intact:   Monitor for areas of redness and/or skin breakdown   Assess vascular access sites hourly     Problem: ABCDS Injury Assessment  Goal: Absence of physical injury  3/27/2025 0107 by Gill Greco RN  Outcome: Progressing  3/26/2025 1346 by Cori Escalera RN  Outcome: Progressing  Flowsheets (Taken 3/26/2025 1346)  Absence of Physical Injury: Implement safety measures based on patient assessment     Problem: Safety - Adult  Goal: Free from fall injury  3/27/2025 0107 by Gill Greco RN  Outcome: Progressing  3/26/2025 1346 by Cori Escalera RN  Outcome: Progressing  Flowsheets (Taken 3/26/2025 1346)  Free From Fall Injury:   Instruct family/caregiver on patient safety   Based on caregiver fall risk screen, instruct family/caregiver to ask for assistance with transferring infant if caregiver noted to have fall risk factors

## 2025-03-27 NOTE — PLAN OF CARE
Problem: Skin/Tissue Integrity  Goal: Skin integrity remains intact  Description: 1.  Monitor for areas of redness and/or skin breakdown  2.  Assess vascular access sites hourly  3.  Every 4-6 hours minimum:  Change oxygen saturation probe site  4.  Every 4-6 hours:  If on nasal continuous positive airway pressure, respiratory therapy assess nares and determine need for appliance change or resting period  3/27/2025 0819 by Belkis Perez RN  Outcome: Progressing  3/27/2025 0107 by Gill Greco RN  Outcome: Progressing     Problem: Chronic Conditions and Co-morbidities  Goal: Patient's chronic conditions and co-morbidity symptoms are monitored and maintained or improved  3/27/2025 0819 by Belkis Perez RN  Outcome: Progressing  3/27/2025 0107 by Gill Greco RN  Outcome: Progressing     Problem: ABCDS Injury Assessment  Goal: Absence of physical injury  3/27/2025 0819 by Belkis Perez RN  Outcome: Progressing  3/27/2025 0107 by Gill Greco RN  Outcome: Progressing     Problem: Safety - Adult  Goal: Free from fall injury  3/27/2025 0819 by Belkis Perez RN  Outcome: Progressing  3/27/2025 0107 by Gill Greco RN  Outcome: Progressing

## 2025-03-28 ENCOUNTER — HOSPITAL ENCOUNTER (OUTPATIENT)
Dept: OTHER | Age: 80
Discharge: HOME | End: 2025-03-28

## 2025-03-28 PROBLEM — I95.1 SYNCOPE DUE TO ORTHOSTATIC HYPOTENSION: Status: ACTIVE | Noted: 2025-03-28

## 2025-03-28 LAB
ANION GAP SERPL CALCULATED.3IONS-SCNC: 13 MMOL/L (ref 7–16)
BASOPHILS # BLD: 0.06 K/UL (ref 0–0.2)
BASOPHILS NFR BLD: 1 % (ref 0–2)
BUN SERPL-MCNC: 16 MG/DL (ref 6–23)
CALCIUM SERPL-MCNC: 8.8 MG/DL (ref 8.6–10.2)
CHLORIDE SERPL-SCNC: 100 MMOL/L (ref 98–107)
CO2 SERPL-SCNC: 26 MMOL/L (ref 22–29)
CREAT SERPL-MCNC: 1.3 MG/DL (ref 0.5–1)
EOSINOPHIL # BLD: 0.28 K/UL (ref 0.05–0.5)
EOSINOPHILS RELATIVE PERCENT: 5 % (ref 0–6)
ERYTHROCYTE [DISTWIDTH] IN BLOOD BY AUTOMATED COUNT: 13.3 % (ref 11.5–15)
GFR, ESTIMATED: 41 ML/MIN/1.73M2
GLUCOSE SERPL-MCNC: 106 MG/DL (ref 74–99)
HCT VFR BLD AUTO: 34.9 % (ref 34–48)
HGB BLD-MCNC: 11 G/DL (ref 11.5–15.5)
IMM GRANULOCYTES # BLD AUTO: 0.03 K/UL (ref 0–0.58)
IMM GRANULOCYTES NFR BLD: 1 % (ref 0–5)
LYMPHOCYTES NFR BLD: 1.53 K/UL (ref 1.5–4)
LYMPHOCYTES RELATIVE PERCENT: 29 % (ref 20–42)
MCH RBC QN AUTO: 31.3 PG (ref 26–35)
MCHC RBC AUTO-ENTMCNC: 31.5 G/DL (ref 32–34.5)
MCV RBC AUTO: 99.4 FL (ref 80–99.9)
MONOCYTES NFR BLD: 0.57 K/UL (ref 0.1–0.95)
MONOCYTES NFR BLD: 11 % (ref 2–12)
NEUTROPHILS NFR BLD: 54 % (ref 43–80)
NEUTS SEG NFR BLD: 2.87 K/UL (ref 1.8–7.3)
PLATELET, FLUORESCENCE: 143 K/UL (ref 130–450)
PMV BLD AUTO: 10 FL (ref 7–12)
POTASSIUM SERPL-SCNC: 4.2 MMOL/L (ref 3.5–5)
RBC # BLD AUTO: 3.51 M/UL (ref 3.5–5.5)
SODIUM SERPL-SCNC: 139 MMOL/L (ref 132–146)
WBC OTHER # BLD: 5.3 K/UL (ref 4.5–11.5)

## 2025-03-28 PROCEDURE — 85025 COMPLETE CBC W/AUTO DIFF WBC: CPT

## 2025-03-28 PROCEDURE — 80048 BASIC METABOLIC PNL TOTAL CA: CPT

## 2025-03-28 PROCEDURE — 6360000002 HC RX W HCPCS: Performed by: NURSE PRACTITIONER

## 2025-03-28 PROCEDURE — 96372 THER/PROPH/DIAG INJ SC/IM: CPT

## 2025-03-28 PROCEDURE — 2140000000 HC CCU INTERMEDIATE R&B

## 2025-03-28 PROCEDURE — 2500000003 HC RX 250 WO HCPCS: Performed by: NURSE PRACTITIONER

## 2025-03-28 PROCEDURE — 2580000003 HC RX 258: Performed by: NURSE PRACTITIONER

## 2025-03-28 PROCEDURE — 36415 COLL VENOUS BLD VENIPUNCTURE: CPT

## 2025-03-28 PROCEDURE — 6370000000 HC RX 637 (ALT 250 FOR IP): Performed by: NURSE PRACTITIONER

## 2025-03-28 RX ORDER — SODIUM CHLORIDE 9 MG/ML
INJECTION, SOLUTION INTRAVENOUS CONTINUOUS
Status: DISCONTINUED | OUTPATIENT
Start: 2025-03-28 | End: 2025-03-29 | Stop reason: HOSPADM

## 2025-03-28 RX ADMIN — HEPARIN SODIUM 5000 UNITS: 5000 INJECTION INTRAVENOUS; SUBCUTANEOUS at 20:52

## 2025-03-28 RX ADMIN — SODIUM CHLORIDE: 0.9 INJECTION, SOLUTION INTRAVENOUS at 09:32

## 2025-03-28 RX ADMIN — POTASSIUM CHLORIDE 20 MEQ: 1500 TABLET, EXTENDED RELEASE ORAL at 09:07

## 2025-03-28 RX ADMIN — SODIUM CHLORIDE, PRESERVATIVE FREE 10 ML: 5 INJECTION INTRAVENOUS at 09:07

## 2025-03-28 RX ADMIN — HEPARIN SODIUM 5000 UNITS: 5000 INJECTION INTRAVENOUS; SUBCUTANEOUS at 09:07

## 2025-03-28 NOTE — PLAN OF CARE
Problem: Chronic Conditions and Co-morbidities  Goal: Patient's chronic conditions and co-morbidity symptoms are monitored and maintained or improved  3/28/2025 1205 by Anny Rocha RN  Outcome: Progressing  3/27/2025 2302 by Monique Guzman RN  Outcome: Progressing     Problem: Skin/Tissue Integrity  Goal: Skin integrity remains intact  Description: 1.  Monitor for areas of redness and/or skin breakdown  2.  Assess vascular access sites hourly  3.  Every 4-6 hours minimum:  Change oxygen saturation probe site  4.  Every 4-6 hours:  If on nasal continuous positive airway pressure, respiratory therapy assess nares and determine need for appliance change or resting period  3/28/2025 1205 by Anny Rocha RN  Outcome: Progressing  Flowsheets (Taken 3/28/2025 1204)  Skin Integrity Remains Intact: Monitor for areas of redness and/or skin breakdown  3/27/2025 2302 by Monique Guzman RN  Outcome: Progressing     Problem: ABCDS Injury Assessment  Goal: Absence of physical injury  3/28/2025 1205 by Anny Rocha RN  Outcome: Progressing  Flowsheets (Taken 3/28/2025 1204)  Absence of Physical Injury: Implement safety measures based on patient assessment  3/27/2025 2302 by Monique Guzman RN  Outcome: Progressing     Problem: Safety - Adult  Goal: Free from fall injury  3/28/2025 1205 by Anny Rocha RN  Outcome: Progressing  Flowsheets (Taken 3/28/2025 1204)  Free From Fall Injury: Instruct family/caregiver on patient safety  3/27/2025 2302 by Monique Guzman RN  Outcome: Progressing

## 2025-03-28 NOTE — CARE COORDINATION
03/28/25 CM Note:  Pt admitted today to inpatient status DX: pre-syncope  Pt has IV fluids running to address orthostatic blood pressures.  Spoke with son, Anson, and he is the caregiver for his parents 24/7. He has moved in to help his parents after this pt had her hip surgery. Anson states that [facility] is still active and physical therapy, nursing and aides were on case with them.  Called [facility] and they just need a resumption of care order placed-done. Son Anson will provide transport to home upon DC.  PCP is Dr. Anjel Combs Pharmacy is [facility]. Corry Nixon RN CM

## 2025-03-28 NOTE — FLOWSHEET NOTE
03/26/25 1500 03/26/25 2007 03/27/25 0838   Vital Signs   Blood Pressure Lying 165/67  --  104/55   Pulse Lying 66 PER MINUTE  --  73 PER MINUTE   Blood Pressure Sitting 155/87 99/57 99/60   Pulse Sitting 76 PER MINUTE 74 PER MINUTE 79 PER MINUTE   Blood Pressure Standing 96/59 87/50 57/33   Pulse Standing 76 PER MINUTE 70 PER MINUTE 132 PER MINUTE      03/27/25 1935   Vital Signs   Blood Pressure Lying 122/48   Pulse Lying 74 PER MINUTE   Blood Pressure Sitting 91/48   Pulse Sitting 92 PER MINUTE   Blood Pressure Standing 50/41   Pulse Standing 96 PER MINUTE

## 2025-03-28 NOTE — FLOWSHEET NOTE
03/28/25 1118   Vital Signs   Orthostatic B/P and Pulse? Yes   Blood Pressure Lying 135/58   Pulse Lying 64 PER MINUTE   Blood Pressure Sitting 116/62   Pulse Sitting 65 PER MINUTE   Blood Pressure Standing   (patient was unable to stand)

## 2025-03-28 NOTE — PROGRESS NOTES
[facility]                                Progress note    Subjective:  Denies chest pain and dyspnea  Denies dizziness and lightheadedness with change of position    Objective:  Sitting up in bed, conversing without  difficulty  No acute distress  Son present at the bedside, all questions answered  /63   Pulse 66   Temp 98.1 °F (36.7 °C) (Oral)   Resp 17   Ht 1.6 m (5' 3\")   Wt 51.1 kg (112 lb 10.5 oz)   SpO2 97%   BMI 19.96 kg/m²   CONST:  Well developed, well nourished elderly  female who appears stated age. Awake, alert, cooperative, no apparent distress  HEENT:   Head- Normocephalic, atraumatic   Eyes- Conjunctivae pink, anicteric  Throat- Oral mucosa pink and moist  Neck-  No stridor, trachea midline, no jugular venous distention. No adenopathy   CHEST: Chest symmetrical and non-tender to palpation. No accessory muscle use or intercostal retractions  RESPIRATORY: Lung sounds - clear throughout fields   CARDIOVASCULAR:     No carotid bruit  Heart Inspection- shows no noted pulsations  Heart Palpation- no heaves or thrills; PMI is non-displaced   Heart Ausculation- Regular rate and rhythm, no murmur. No s3, s4 or rub   PV: No lower extremity edema. No varicosities. Pedal pulses palpable, no clubbing or cyanosis   ABDOMEN: Soft, non-tender to light palpation. Bowel sounds present. No palpable masses no organomegaly; no abdominal bruit  MS: Good muscle strength and tone. No atrophy or abnormal movements.   : Deferred  SKIN: Warm and dry no statis dermatitis or ulcers   NEURO / PSYCH: Oriented to person, place and time. Speech clear and appropriate. Follows all commands. Pleasant affect      Recent Labs     03/25/25  1515 03/26/25  0646 03/27/25  0504 03/28/25  0551   WBC 5.2 4.6 4.7 5.3   HGB 10.3* 9.7* 10.3* 11.0*   HCT 33.2* 31.5* 32.5* 34.9   * 128* 136  --        Recent Labs     03/26/25  0646 03/27/25  0504 03/28/25  0551    142 139   K 4.2 4.1 4.2   CL  106 104 100   CO2 24 24 26   BUN 20 17 16   CREATININE 1.3* 1.2* 1.3*   CALCIUM 8.4* 8.4* 8.8     03/25/2025 CXR:  No acute process.  Suspect mild cardiomegaly.     03/25/2025 CT Head WO contrast:  No acute intracranial abnormality.     ASSESSMENT;  Syncope.  Normal pacemaker function upon interrogation-recently placed 3/17/2025  Known Hx Second and third-degree AV block, syncope with resultant left hip fracture, sinus node dysfunction s/p pacemaker insertion 03/17/2025,  Chronic HFpEF.  NT proBNP 10,933  Troponin elevation  Bicuspid aortic valve with mild AS on TTE 03/2024  HTN  ROBERT superimposed on CKD (baseline SCr 1-1.1) with BUN/SCr 20/1.3 (03/25)  GERD  Known Hx Esophageal stricture s/p dilatation  Chronic back pain  Hyperglycemia with no known Hx DM  Anemia, stable with H&H 10.3/33.2        PLAN:  Hold offending agents, pacemaker interrogation within normal limits, electrophysiology following   Maintain normal electrolytes potassium of 4 and magnesium 2   Continue diuresis for chronic elevation proBNP in setting of HFpEF  Obtain orthostatic BP  Continue telemetry monitoring  Monitor BMP, CBC (pending)  Monitor daily weight, I&O, NT proBNP  Appreciate Electrophysiology input  She will be okay for discharge from medicine standpoint if blood pressure remains within normal limits and no further plans from electrophysiology  Patient counseled to stay well-hydrated    03/27/2025  Syncope, no recurrence  +Orthostatic BP  Initiate gentle hydration  Ab Hose  Monitor BMP, CBC  Continue to monitor orthostatic BP  Mild anemia stable, continue to monitor CBC    03/28/2025  Syncope, no recurrence  CT of the head unremarkable  Pacemaker interrogation within normal limits  +Orthostatic BP  Hold Bumex  Continue Ab Hose  Initiate gentle hydration   Monitor BMP, CBC  Continue to monitor orthostatic BP  Mild anemia stable, continue to monitor CBC  Son present at the bedside, all questions answered      Code status:

## 2025-03-29 VITALS
SYSTOLIC BLOOD PRESSURE: 127 MMHG | HEART RATE: 60 BPM | WEIGHT: 114.42 LBS | TEMPERATURE: 97.1 F | DIASTOLIC BLOOD PRESSURE: 50 MMHG | OXYGEN SATURATION: 99 % | HEIGHT: 63 IN | RESPIRATION RATE: 18 BRPM | BODY MASS INDEX: 20.27 KG/M2

## 2025-03-29 LAB
ANION GAP SERPL CALCULATED.3IONS-SCNC: 14 MMOL/L (ref 7–16)
BASOPHILS # BLD: 0.05 K/UL (ref 0–0.2)
BASOPHILS NFR BLD: 1 % (ref 0–2)
BUN SERPL-MCNC: 15 MG/DL (ref 6–23)
CALCIUM SERPL-MCNC: 8.5 MG/DL (ref 8.6–10.2)
CHLORIDE SERPL-SCNC: 102 MMOL/L (ref 98–107)
CO2 SERPL-SCNC: 22 MMOL/L (ref 22–29)
CREAT SERPL-MCNC: 1.2 MG/DL (ref 0.5–1)
EOSINOPHIL # BLD: 0.34 K/UL (ref 0.05–0.5)
EOSINOPHILS RELATIVE PERCENT: 7 % (ref 0–6)
ERYTHROCYTE [DISTWIDTH] IN BLOOD BY AUTOMATED COUNT: 13.2 % (ref 11.5–15)
GFR, ESTIMATED: 46 ML/MIN/1.73M2
GLUCOSE SERPL-MCNC: 91 MG/DL (ref 74–99)
HCT VFR BLD AUTO: 34.5 % (ref 34–48)
HGB BLD-MCNC: 10.6 G/DL (ref 11.5–15.5)
IMM GRANULOCYTES # BLD AUTO: 0.03 K/UL (ref 0–0.58)
IMM GRANULOCYTES NFR BLD: 1 % (ref 0–5)
LYMPHOCYTES NFR BLD: 1.28 K/UL (ref 1.5–4)
LYMPHOCYTES RELATIVE PERCENT: 26 % (ref 20–42)
MCH RBC QN AUTO: 31.5 PG (ref 26–35)
MCHC RBC AUTO-ENTMCNC: 30.7 G/DL (ref 32–34.5)
MCV RBC AUTO: 102.7 FL (ref 80–99.9)
MONOCYTES NFR BLD: 0.56 K/UL (ref 0.1–0.95)
MONOCYTES NFR BLD: 11 % (ref 2–12)
NEUTROPHILS NFR BLD: 54 % (ref 43–80)
NEUTS SEG NFR BLD: 2.65 K/UL (ref 1.8–7.3)
PLATELET, FLUORESCENCE: 127 K/UL (ref 130–450)
PMV BLD AUTO: 10.4 FL (ref 7–12)
POTASSIUM SERPL-SCNC: 4.5 MMOL/L (ref 3.5–5)
RBC # BLD AUTO: 3.36 M/UL (ref 3.5–5.5)
SODIUM SERPL-SCNC: 138 MMOL/L (ref 132–146)
WBC OTHER # BLD: 4.9 K/UL (ref 4.5–11.5)

## 2025-03-29 PROCEDURE — 6360000002 HC RX W HCPCS: Performed by: NURSE PRACTITIONER

## 2025-03-29 PROCEDURE — 2580000003 HC RX 258: Performed by: NURSE PRACTITIONER

## 2025-03-29 PROCEDURE — 2580000003 HC RX 258: Performed by: INTERNAL MEDICINE

## 2025-03-29 PROCEDURE — 36415 COLL VENOUS BLD VENIPUNCTURE: CPT

## 2025-03-29 PROCEDURE — 6370000000 HC RX 637 (ALT 250 FOR IP): Performed by: NURSE PRACTITIONER

## 2025-03-29 PROCEDURE — 85025 COMPLETE CBC W/AUTO DIFF WBC: CPT

## 2025-03-29 PROCEDURE — 80048 BASIC METABOLIC PNL TOTAL CA: CPT

## 2025-03-29 RX ORDER — 0.9 % SODIUM CHLORIDE 0.9 %
250 INTRAVENOUS SOLUTION INTRAVENOUS ONCE
Status: DISCONTINUED | OUTPATIENT
Start: 2025-03-29 | End: 2025-03-29 | Stop reason: HOSPADM

## 2025-03-29 RX ADMIN — SODIUM CHLORIDE: 0.9 INJECTION, SOLUTION INTRAVENOUS at 05:09

## 2025-03-29 RX ADMIN — POTASSIUM CHLORIDE 20 MEQ: 1500 TABLET, EXTENDED RELEASE ORAL at 08:33

## 2025-03-29 RX ADMIN — HEPARIN SODIUM 5000 UNITS: 5000 INJECTION INTRAVENOUS; SUBCUTANEOUS at 08:33

## 2025-03-29 NOTE — PROGRESS NOTES
St stood with much assistance no complaints of dizziness but unable to ambulate. Ortho's were still positive. Updated Dr. Barajas

## 2025-03-29 NOTE — PROGRESS NOTES
stockings, she has been educated on rising slowly from sitting position, receiving IV fluids and diuresis has been discontinued  Recheck ambulating status today and check orthostatics  DC plan today if no symptoms with positive orthosis-again she has been educated on rising slowly from sitting or laying position, using assist device, wearing compression stockings etc.  On admission her AM-PAC score was 11/24-she is refusing placement at subacute rehab and wants to go home    Code status: FULL  Consultants:  Electrophysiology  DVT Prophylaxis SQ Heparin  PT/OT  Discharge planning           Eduarda Barajas MD,  12:45 PM  3/29/2025

## 2025-03-29 NOTE — CARE COORDINATION
SOCIAL WORK/CASEMANAGEMENT TRANSITION OF CARE PLANNING( ENIO MENDOSA, MANDOW [phone]): rn said she is not sure if pt is going home today. [facility] is [facility] with [facility] , [facility] orders are being placed by cm today. HERMINIO Howe  3/29/2025

## 2025-03-29 NOTE — CARE COORDINATION
3/29/2025dc order noted. Alex notified [facility] of dc today.  Electronically signed by HERMINIO Pryor on 3/29/2025 at 3:02 PM

## 2025-03-29 NOTE — FLOWSHEET NOTE
03/29/25 0430   Vitals   Blood Pressure Lying 141/52   Pulse Lying 65 PER MINUTE   Blood Pressure Sitting 135/55   Pulse Sitting 85 PER MINUTE   Blood Pressure Standing 72/49   Pulse Standing 95 PER MINUTE

## 2025-03-29 NOTE — PROGRESS NOTES
Explained discharge instructions and follow up appointments to pt and son both verbalized understanding. Heart monitor removed and returned.  Placed pt in transport

## 2025-03-29 NOTE — PLAN OF CARE
Problem: Chronic Conditions and Co-morbidities  Goal: Patient's chronic conditions and co-morbidity symptoms are monitored and maintained or improved  3/29/2025 0025 by Monique Guzman RN  Outcome: Progressing  3/28/2025 1205 by Anny Rocha RN  Outcome: Progressing     Problem: Skin/Tissue Integrity  Goal: Skin integrity remains intact  Description: 1.  Monitor for areas of redness and/or skin breakdown  2.  Assess vascular access sites hourly  3.  Every 4-6 hours minimum:  Change oxygen saturation probe site  4.  Every 4-6 hours:  If on nasal continuous positive airway pressure, respiratory therapy assess nares and determine need for appliance change or resting period  3/29/2025 0025 by Monique Guzman RN  Outcome: Progressing  3/28/2025 1205 by Anny Rocha RN  Outcome: Progressing  Flowsheets (Taken 3/28/2025 1204)  Skin Integrity Remains Intact: Monitor for areas of redness and/or skin breakdown     Problem: ABCDS Injury Assessment  Goal: Absence of physical injury  3/29/2025 0025 by Monique Guzman RN  Outcome: Progressing  3/28/2025 1205 by Anny Rocha RN  Outcome: Progressing  Flowsheets (Taken 3/28/2025 1204)  Absence of Physical Injury: Implement safety measures based on patient assessment     Problem: Safety - Adult  Goal: Free from fall injury  3/29/2025 0025 by Monique Guzman RN  Outcome: Progressing  3/28/2025 1205 by Anny Rocha RN  Outcome: Progressing  Flowsheets (Taken 3/28/2025 1204)  Free From Fall Injury: Instruct family/caregiver on patient safety

## 2025-03-29 NOTE — PROGRESS NOTES
Per Dr. Barajas pt ok to Discharge today notified  on call. [facility] set up for pt.

## 2025-03-30 LAB
EKG ATRIAL RATE: 67 BPM
EKG P-R INTERVAL: 300 MS
EKG Q-T INTERVAL: 440 MS
EKG QRS DURATION: 140 MS
EKG QTC CALCULATION (BAZETT): 464 MS
EKG R AXIS: 38 DEGREES
EKG T AXIS: 40 DEGREES
EKG VENTRICULAR RATE: 67 BPM
MICROORGANISM SPEC CULT: NORMAL
MICROORGANISM SPEC CULT: NORMAL
SERVICE CMNT-IMP: NORMAL
SERVICE CMNT-IMP: NORMAL
SPECIMEN DESCRIPTION: NORMAL
SPECIMEN DESCRIPTION: NORMAL

## 2025-03-31 ENCOUNTER — CLINICAL SUPPORT (OUTPATIENT)
Dept: NON INVASIVE DIAGNOSTICS | Age: 80
End: 2025-03-31

## 2025-03-31 DIAGNOSIS — I49.5 SINUS NODE DYSFUNCTION (HCC): ICD-10-CM

## 2025-03-31 DIAGNOSIS — Z95.0 CARDIAC PACEMAKER IN SITU: Primary | ICD-10-CM

## 2025-03-31 DIAGNOSIS — I44.2 COMPLETE HEART BLOCK (HCC): ICD-10-CM

## 2025-03-31 NOTE — PROGRESS NOTES
Physician Progress Note      PATIENT:               ANA MARIA MEDINA  CSN #:                  [id number]  :                       1945  ADMIT DATE:       3/25/2025 3:00 PM  DISCH DATE:        3/29/2025 4:54 PM  RESPONDING  PROVIDER #:        Eduarda Barajas MD          QUERY TEXT:    Dear Attending Physician,    Patient admitted with Syncope. Noted to have positive Orthostatic bp and   Bumex, Diovan on hold. If possible, please document in progress notes and   discharge summary after study the etiology of the syncope:    The medical record reflects the following:  Risk Factors:  Hx Second and third-degree AV block, syncope with resultant   left hip fracture, Chronic HFpEF.  Clinical Indicators: Per PCP,\"...Syncope.  Normal pacemaker function upon   interrogation...Syncope, no recurrence +Orthostatic BP ...\"  Per EP,\"...A-V   paced at 60 bpm without any bradycardia or evidence of pacemaker malfunction   additionally her device was checked and showed normal function as well as a   CXR that showed stable lead placement... Episode of near syncope -   Lightheadedness no evidenced of arrhythmia or pacemaker malfunction. - Cannot   rule out hypotension...Check orthostatic BP and consider discontinue Bumex if   is positive ...\"  Treatment: Bumex, Diovan on hold, IVF    Thank you,  Melody Shelley RN [facility]  Clinical Documentation Improvement Specialist  Phone# [phone]  Options provided:  -- Syncope due to orthostatic hypotension due to Diovan and/or Bumex.  -- Syncope due to orthostatic hypotension due to medication, Please state the   medication causing the Syncope  -- Syncope due to to orthostatic hypotension due to, Please specify the cause   of Syncope and ortho hypotension  -- Other - I will add my own diagnosis  -- Disagree - Not applicable / Not valid  -- Disagree - Clinically unable to determine / Unknown  -- Refer to Clinical Documentation Reviewer    PROVIDER RESPONSE TEXT:    The syncope is due to orthostatic

## 2025-04-01 PROBLEM — S72.145K: Status: RESOLVED | Noted: 2025-02-21 | Resolved: 2025-04-01

## 2025-04-09 ENCOUNTER — OFFICE VISIT (OUTPATIENT)
Dept: ORTHOPEDIC SURGERY | Age: 80
End: 2025-04-09

## 2025-04-09 VITALS
OXYGEN SATURATION: 98 % | TEMPERATURE: 97.5 F | DIASTOLIC BLOOD PRESSURE: 70 MMHG | HEART RATE: 62 BPM | SYSTOLIC BLOOD PRESSURE: 119 MMHG

## 2025-04-09 DIAGNOSIS — S72.002A CLOSED FRACTURE OF LEFT HIP, INITIAL ENCOUNTER (HCC): Primary | ICD-10-CM

## 2025-04-09 PROCEDURE — 99024 POSTOP FOLLOW-UP VISIT: CPT | Performed by: STUDENT IN AN ORGANIZED HEALTH CARE EDUCATION/TRAINING PROGRAM

## 2025-04-09 NOTE — PROGRESS NOTES
PM      Note: This report was completed using [employer] voiced recognition software.  Every effort has been made to ensure accuracy; however, inadvertent computerized transcription errors may be present.

## 2025-04-14 ENCOUNTER — HOSPITAL ENCOUNTER (OUTPATIENT)
Dept: OTHER | Age: 80
Setting detail: THERAPIES SERIES
Discharge: HOME | End: 2025-04-14
Payer: MEDICARE

## 2025-04-14 ENCOUNTER — RESULTS FOLLOW-UP (OUTPATIENT)
Age: 80
End: 2025-04-14

## 2025-04-14 VITALS
HEART RATE: 70 BPM | OXYGEN SATURATION: 98 % | SYSTOLIC BLOOD PRESSURE: 128 MMHG | RESPIRATION RATE: 18 BRPM | BODY MASS INDEX: 20.02 KG/M2 | WEIGHT: 113 LBS | DIASTOLIC BLOOD PRESSURE: 61 MMHG

## 2025-04-14 LAB
ANION GAP SERPL CALCULATED.3IONS-SCNC: 13 MMOL/L (ref 7–16)
BNP SERPL-MCNC: ABNORMAL PG/ML (ref 0–450)
BUN SERPL-MCNC: 14 MG/DL (ref 6–23)
CALCIUM SERPL-MCNC: 8.7 MG/DL (ref 8.6–10.2)
CHLORIDE SERPL-SCNC: 106 MMOL/L (ref 98–107)
CO2 SERPL-SCNC: 22 MMOL/L (ref 22–29)
CREAT SERPL-MCNC: 1.2 MG/DL (ref 0.5–1)
GFR, ESTIMATED: 45 ML/MIN/1.73M2
GLUCOSE SERPL-MCNC: 133 MG/DL (ref 74–99)
POTASSIUM SERPL-SCNC: 4.3 MMOL/L (ref 3.5–5)
SODIUM SERPL-SCNC: 141 MMOL/L (ref 132–146)

## 2025-04-14 PROCEDURE — 80048 BASIC METABOLIC PNL TOTAL CA: CPT

## 2025-04-14 PROCEDURE — 83880 ASSAY OF NATRIURETIC PEPTIDE: CPT

## 2025-04-14 PROCEDURE — 99204 OFFICE O/P NEW MOD 45 MIN: CPT

## 2025-04-14 NOTE — PROGRESS NOTES
Congestive Heart Failure Clinic   [facility]      Referring Provider: -  Primary Care Physician: Anjel Duran DO   Cardiologist: Dr. Cardenas  Nephrologist: -      HISTORY OF PRESENT ILLNESS:     Julita Madrid is a 79 y.o. (1945) female with a history of HFpEF (EF> 50%)  Pre Cupid:     Lab Results   Component Value Date    LVEF 50 03/16/2025     Post Cupid:  No results found for: \"EFBP\"      She presents to the CHF clinic for ongoing evaluation and monitoring of heart failure.    In the CHF clinic today she denies any adverse symptoms except:  [] Dizziness or lightheadedness   [] Syncope or near syncope  [] Recent Fall  [] Shortness of breath at rest   [] Dyspnea with exertion  [] Decline in functional capacity (unable to perform activities they had previously been able to do)  [] Fatigue   [] Orthopnea  [] PND  [] Nocturnal cough  [] Hemoptysis  [] Chest pain, pressure, or discomfort  [] Palpitations  [] Abdominal distention  [] Abdominal bloating  [] Early satiety  [] Blood in stool   [] Diarrhea  [] Constipation  [] Nausea/Vomiting  [] Decreased urinary response to oral diuretic   [] Scrotal swelling   [] Lower extremity edema  [] Used PRN doses of oral diuretic   [] Weight gain    Wt Readings from Last 3 Encounters:   04/14/25 51.3 kg (113 lb)   04/01/25 48.5 kg (107 lb)   03/29/25 51.9 kg (114 lb 6.7 oz)       SOCIAL HISTORY:  [x] Denies tobacco, alcohol or illicit drug abuse  [] Tobacco use:  [] ETOH use:  [] Illicit drug use:        MEDICATIONS:    Allergies   Allergen Reactions    Altace [Ramipril]     Amlodipine      edema    Covera-Hs [Verapamil]     Hctz [Hydrochlorothiazide]     Influenza Vaccines      miscarriage     Prior to Visit Medications    Medication Sig Taking? Authorizing Provider   aspirin 81 MG EC tablet Take 1 tablet by mouth in the morning and at bedtime  Bruce Peñaloza DO     GUIDELINE DIRECTED MEDICAL THERAPY for

## 2025-04-14 NOTE — RESULT ENCOUNTER NOTE
Recent hospitalization for orthostatic hypotension with syncope   Diuretics were stopped and she was given IV fluids   Therefore will continue with asa only daily -- patient to call with any signs/symptoms of fluid retention.   Could consider SGLT2i in future pending clinic coarse

## 2025-04-14 NOTE — PLAN OF CARE
Problem: Chronic Conditions and Co-morbidities  Goal: Patient's chronic conditions and co-morbidity symptoms are monitored and maintained or improved  Flowsheets (Taken 4/14/2025 1158)  Care Plan - Patient's Chronic Conditions and Co-Morbidity Symptoms are Monitored and Maintained or Improved: Monitor and assess patient's chronic conditions and comorbid symptoms for stability, deterioration, or improvement

## 2025-04-15 NOTE — PROGRESS NOTES
Recent hospitalization for orthostatic hypotension with syncope   Diuretics were stopped and she was given IV fluids   Therefore will continue with asa only daily -- patient to call with any signs/symptoms of fluid retention.   Could consider SGLT2i in future pending clinic coarse          Son notified of above. Demonstrates understanding.

## 2025-04-22 ENCOUNTER — HOSPITAL ENCOUNTER (OUTPATIENT)
Dept: OTHER | Age: 80
Setting detail: THERAPIES SERIES
Discharge: HOME | End: 2025-04-22
Payer: MEDICARE

## 2025-04-22 VITALS
SYSTOLIC BLOOD PRESSURE: 138 MMHG | OXYGEN SATURATION: 98 % | BODY MASS INDEX: 20.02 KG/M2 | DIASTOLIC BLOOD PRESSURE: 65 MMHG | WEIGHT: 113 LBS | RESPIRATION RATE: 18 BRPM | HEART RATE: 76 BPM

## 2025-04-22 LAB
ANION GAP SERPL CALCULATED.3IONS-SCNC: 10 MMOL/L (ref 7–16)
BNP SERPL-MCNC: ABNORMAL PG/ML (ref 0–450)
BUN SERPL-MCNC: 15 MG/DL (ref 8–23)
CALCIUM SERPL-MCNC: 8.8 MG/DL (ref 8.8–10.2)
CHLORIDE SERPL-SCNC: 105 MMOL/L (ref 98–107)
CO2 SERPL-SCNC: 24 MMOL/L (ref 22–29)
CREAT SERPL-MCNC: 1.1 MG/DL (ref 0.5–1)
GFR, ESTIMATED: 52 ML/MIN/1.73M2
GLUCOSE SERPL-MCNC: 130 MG/DL (ref 74–99)
POTASSIUM SERPL-SCNC: 4.1 MMOL/L (ref 3.4–4.5)
SODIUM SERPL-SCNC: 139 MMOL/L (ref 136–145)

## 2025-04-22 PROCEDURE — 2500000003 HC RX 250 WO HCPCS: Performed by: INTERNAL MEDICINE

## 2025-04-22 PROCEDURE — 96374 THER/PROPH/DIAG INJ IV PUSH: CPT

## 2025-04-22 PROCEDURE — 6360000002 HC RX W HCPCS: Performed by: INTERNAL MEDICINE

## 2025-04-22 PROCEDURE — 99214 OFFICE O/P EST MOD 30 MIN: CPT

## 2025-04-22 PROCEDURE — 80048 BASIC METABOLIC PNL TOTAL CA: CPT

## 2025-04-22 PROCEDURE — 83880 ASSAY OF NATRIURETIC PEPTIDE: CPT

## 2025-04-22 RX ORDER — BUMETANIDE 0.25 MG/ML
2 INJECTION, SOLUTION INTRAMUSCULAR; INTRAVENOUS ONCE
Status: COMPLETED | OUTPATIENT
Start: 2025-04-22 | End: 2025-04-22

## 2025-04-22 RX ORDER — SODIUM CHLORIDE 0.9 % (FLUSH) 0.9 %
5-40 SYRINGE (ML) INJECTION ONCE
Status: COMPLETED | OUTPATIENT
Start: 2025-04-22 | End: 2025-04-22

## 2025-04-22 RX ADMIN — BUMETANIDE 2 MG: 0.25 INJECTION INTRAMUSCULAR; INTRAVENOUS at 13:53

## 2025-04-22 RX ADMIN — SODIUM CHLORIDE, PRESERVATIVE FREE 10 ML: 5 INJECTION INTRAVENOUS at 13:53

## 2025-04-22 NOTE — PLAN OF CARE
Problem: Chronic Conditions and Co-morbidities  Goal: Patient's chronic conditions and co-morbidity symptoms are monitored and maintained or improved  Flowsheets (Taken 4/22/2025 1322)  Care Plan - Patient's Chronic Conditions and Co-Morbidity Symptoms are Monitored and Maintained or Improved: Monitor and assess patient's chronic conditions and comorbid symptoms for stability, deterioration, or improvement

## 2025-04-22 NOTE — PROGRESS NOTES
Congestive Heart Failure Clinic   [facility]      Referring Provider: -  Primary Care Physician: Anjel Duran DO   Cardiologist: Dr. Cardenas  Nephrologist: -      HISTORY OF PRESENT ILLNESS:     Julita Madrid is a 79 y.o. (1945) female with a history of HFpEF (EF> 50%)  Pre Cupid:     Lab Results   Component Value Date    LVEF 50 03/16/2025     Post Cupid:  No results found for: \"EFBP\"      She presents to the CHF clinic for ongoing evaluation and monitoring of heart failure.    In the CHF clinic today she denies any adverse symptoms except:  [] Dizziness or lightheadedness   [] Syncope or near syncope  [] Recent Fall  [] Shortness of breath at rest   [x] Dyspnea with exertion  [] Decline in functional capacity (unable to perform activities they had previously been able to do)  [] Fatigue   [] Orthopnea  [] PND  [] Nocturnal cough  [] Hemoptysis  [] Chest pain, pressure, or discomfort  [] Palpitations  [] Abdominal distention  [] Abdominal bloating  [] Early satiety  [] Blood in stool   [] Diarrhea  [] Constipation  [] Nausea/Vomiting  [] Decreased urinary response to oral diuretic   [] Scrotal swelling   [] Lower extremity edema  [] Used PRN doses of oral diuretic   [] Weight gain    Wt Readings from Last 3 Encounters:   04/22/25 51.3 kg (113 lb)   04/14/25 51.3 kg (113 lb)   04/01/25 48.5 kg (107 lb)       SOCIAL HISTORY:  [x] Denies tobacco, alcohol or illicit drug abuse  [] Tobacco use:  [] ETOH use:  [] Illicit drug use:        MEDICATIONS:    Allergies   Allergen Reactions    Altace [Ramipril]     Amlodipine      edema    Covera-Hs [Verapamil]     Hctz [Hydrochlorothiazide]     Influenza Vaccines      miscarriage     Prior to Visit Medications    Medication Sig Taking? Authorizing Provider   aspirin 81 MG EC tablet Take 1 tablet by mouth in the morning and at bedtime Yes Bruce Peñaloza DO     GUIDELINE DIRECTED MEDICAL THERAPY for

## 2025-04-23 ENCOUNTER — RESULTS FOLLOW-UP (OUTPATIENT)
Age: 80
End: 2025-04-23

## 2025-04-23 RX ORDER — POTASSIUM CHLORIDE 750 MG/1
10 TABLET, EXTENDED RELEASE ORAL DAILY
Qty: 90 TABLET | Refills: 1 | Status: SHIPPED | OUTPATIENT
Start: 2025-04-23

## 2025-04-23 RX ORDER — TORSEMIDE 10 MG/1
10 TABLET ORAL DAILY
Qty: 30 TABLET | Refills: 3 | Status: SHIPPED | OUTPATIENT
Start: 2025-04-23

## 2025-04-23 NOTE — RESULT ENCOUNTER NOTE
Labs and CHF clinic note reviewed  Start Torsemide 10 mg daily   Start potassium 10 meq daily  Follow up in CHF clinic as scheduled next week

## 2025-04-30 ENCOUNTER — HOSPITAL ENCOUNTER (OUTPATIENT)
Dept: OTHER | Age: 80
Setting detail: THERAPIES SERIES
Discharge: HOME | End: 2025-04-30
Payer: MEDICARE

## 2025-04-30 VITALS
OXYGEN SATURATION: 99 % | HEART RATE: 74 BPM | RESPIRATION RATE: 18 BRPM | WEIGHT: 110 LBS | DIASTOLIC BLOOD PRESSURE: 64 MMHG | BODY MASS INDEX: 19.49 KG/M2 | SYSTOLIC BLOOD PRESSURE: 139 MMHG

## 2025-04-30 LAB
ANION GAP SERPL CALCULATED.3IONS-SCNC: 12 MMOL/L (ref 7–16)
BNP SERPL-MCNC: 8560 PG/ML (ref 0–450)
BUN SERPL-MCNC: 15 MG/DL (ref 8–23)
CALCIUM SERPL-MCNC: 8.9 MG/DL (ref 8.8–10.2)
CHLORIDE SERPL-SCNC: 105 MMOL/L (ref 98–107)
CO2 SERPL-SCNC: 25 MMOL/L (ref 22–29)
CREAT SERPL-MCNC: 1.3 MG/DL (ref 0.5–1)
GFR, ESTIMATED: 41 ML/MIN/1.73M2
GLUCOSE SERPL-MCNC: 103 MG/DL (ref 74–99)
POTASSIUM SERPL-SCNC: 4.3 MMOL/L (ref 3.5–5.1)
SODIUM SERPL-SCNC: 142 MMOL/L (ref 136–145)

## 2025-04-30 PROCEDURE — 83880 ASSAY OF NATRIURETIC PEPTIDE: CPT

## 2025-04-30 PROCEDURE — 80048 BASIC METABOLIC PNL TOTAL CA: CPT

## 2025-04-30 PROCEDURE — 99214 OFFICE O/P EST MOD 30 MIN: CPT

## 2025-04-30 NOTE — PROGRESS NOTES
Signed         Labs and CHF clinic note reviewed  Start Torsemide 10 mg daily   Start potassium 10 meq daily  Follow up in CHF clinic as scheduled next week                   Patient's Son notified of above. Demonstrates understanding.   
  03/29/2025 1.2 (H)     Glucose (mg/dL)   Date Value   04/22/2025 130 (H)   04/14/2025 133 (H)   03/29/2025 91     Calcium (mg/dL)   Date Value   04/22/2025 8.8   04/14/2025 8.7   03/29/2025 8.5 (L)     BNP:  NT Pro-BNP (pg/mL)   Date Value   04/22/2025 14,320 (H)   04/14/2025 10,412 (H)   03/25/2025 10,933 (H)      CBC:  WBC (k/uL)   Date Value   03/29/2025 4.9     Hemoglobin (g/dL)   Date Value   03/29/2025 10.6 (L)     Hematocrit (%)   Date Value   03/29/2025 34.5     Platelets (k/uL)   Date Value   03/27/2025 136     Iron Studies:  No results found for: \"FERRITIN\", \"IRON\", \"TIBC\"  Hepatic:  AST (U/L)   Date Value   03/25/2025 24     ALT (U/L)   Date Value   03/25/2025 7     Total Bilirubin (mg/dL)   Date Value   03/25/2025 0.5     Alkaline Phosphatase (U/L)   Date Value   03/25/2025 193 (H)     INR:  INR (no units)   Date Value   03/15/2025 1.1         Wt Readings from Last 3 Encounters:   04/30/25 49.9 kg (110 lb)   04/22/25 51.3 kg (113 lb)   04/14/25 51.3 kg (113 lb)       ASSESSMENT/PLAN:    [x] Euvolemic          [] Hypervolemic, with increase from baseline:  [] Shortness of breath/BAXTER  [] JVD  [] HJR  [] Abnormal lung assessment:   [] Orthopnea  [] PND  [] Decreased urinary response to oral diuretic   [] Scrotal swelling   [] Lower extremity edema  [] Compression stockings provided  [] Decline in functional capacity (unable to perform activities they had previously been able to do)  [] Weight gain   [] IV diuretics given No  [] Provider notified of recurrent IV diuretic use    Additional Notes:     Patient presents for follow up today. No complaints of sob, LE edema, or abdominal distention/bloating. No JVD on assessment. Follow up scheduled for 1 week, encouraged patient to call the clinic if they need an appointment sooner.     [x]Lab work obtained    [x] Patient/Family Educated On:  [x] HF zones (Green, Yellow, Red) and aware of when to take action   [x] Daily weights  [] Scale provided   [x] Low

## 2025-04-30 NOTE — PLAN OF CARE
Problem: Chronic Conditions and Co-morbidities  Goal: Patient's chronic conditions and co-morbidity symptoms are monitored and maintained or improved  Flowsheets (Taken 4/30/2025 1335)  Care Plan - Patient's Chronic Conditions and Co-Morbidity Symptoms are Monitored and Maintained or Improved: Monitor and assess patient's chronic conditions and comorbid symptoms for stability, deterioration, or improvement

## 2025-05-05 ENCOUNTER — HOSPITAL ENCOUNTER (OUTPATIENT)
Dept: OTHER | Age: 80
Setting detail: THERAPIES SERIES
Discharge: HOME | End: 2025-05-05
Payer: MEDICARE

## 2025-05-05 ENCOUNTER — RESULTS FOLLOW-UP (OUTPATIENT)
Age: 80
End: 2025-05-05

## 2025-05-05 VITALS
HEART RATE: 68 BPM | DIASTOLIC BLOOD PRESSURE: 53 MMHG | OXYGEN SATURATION: 99 % | RESPIRATION RATE: 18 BRPM | BODY MASS INDEX: 19.49 KG/M2 | SYSTOLIC BLOOD PRESSURE: 115 MMHG | WEIGHT: 110 LBS

## 2025-05-05 LAB
ANION GAP SERPL CALCULATED.3IONS-SCNC: 11 MMOL/L (ref 7–16)
BNP SERPL-MCNC: 7851 PG/ML (ref 0–450)
BUN SERPL-MCNC: 16 MG/DL (ref 8–23)
CALCIUM SERPL-MCNC: 9 MG/DL (ref 8.8–10.2)
CHLORIDE SERPL-SCNC: 105 MMOL/L (ref 98–107)
CO2 SERPL-SCNC: 25 MMOL/L (ref 22–29)
CREAT SERPL-MCNC: 1.3 MG/DL (ref 0.5–1)
GFR, ESTIMATED: 42 ML/MIN/1.73M2
GLUCOSE SERPL-MCNC: 175 MG/DL (ref 74–99)
POTASSIUM SERPL-SCNC: 4 MMOL/L (ref 3.5–5.1)
SODIUM SERPL-SCNC: 141 MMOL/L (ref 136–145)

## 2025-05-05 PROCEDURE — 99214 OFFICE O/P EST MOD 30 MIN: CPT

## 2025-05-05 PROCEDURE — 83880 ASSAY OF NATRIURETIC PEPTIDE: CPT

## 2025-05-05 PROCEDURE — 80048 BASIC METABOLIC PNL TOTAL CA: CPT

## 2025-05-05 NOTE — RESULT ENCOUNTER NOTE
Labs and CHF Clinic note reviewed    HFpEF    Euvolemic on exam in clinic today.  Weight 110 pounds in clinic today which is the exact same weight she was at last visit on 4/30/2025.  Following visit on 4/22/2025, patient was started on torsemide 10 mg p.o. daily and potassium chloride 10 mEq p.o. daily.    Wt Readings from Last 3 Encounters:  05/05/25 : 49.9 kg (110 lb)  04/30/25 : 49.9 kg (110 lb)  04/22/25 : 51.3 kg (113 lb)    BP Readings from Last 1 Encounters:  05/05/25 : (!) 115/53    Pulse Readings from Last 1 Encounters:  05/05/25 : 68      Current GDMT:  Torsemide 10 mg p.o. daily  Potassium chloride 10 mEq p.o. daily    Also on:  Aspirin 81 mg p.o. daily      Patient had recent hospitalization in 3 of 2025 for hypotension/orthostatic hypotension and syncope and diuretics had been stopped at that time and she was treated with IV fluids.  She had follow-up in CHF clinic on 4/14/2025 and felt to be euvolemic at that time.  She had subsequent CHF clinic visit on 4/22/2025 and felt to be hypervolemic and was subsequently added on torsemide 10 mg daily and potassium 10 mEq p.o. daily.      BMP today with stable renal function and electrolytes compared to prior.  NT proBNP 7851 compared to 8560 on 4/30/2025 and 14,320 on 4/22/2025.  She    1. Continue current meds the same  2.  Consider addition of SGLT2 inhibitor in future if renal function and electrolytes remain stable  3.  Follow-up with CHF clinic on 6/2/25 as scheduled

## 2025-05-05 NOTE — PROGRESS NOTES
Congestive Heart Failure Clinic   [facility]      Referring Provider: -  Primary Care Physician: Anjel Duran DO   Cardiologist: Dr. Cardenas  Nephrologist: -      HISTORY OF PRESENT ILLNESS:     Julita Madrid is a 79 y.o. (1945) female with a history of HFpEF (EF> 50%)  Pre Cupid:     Lab Results   Component Value Date    LVEF 50 03/16/2025     Post Cupid:  No results found for: \"EFBP\"      She presents to the CHF clinic for ongoing evaluation and monitoring of heart failure.    In the CHF clinic today she denies any adverse symptoms except:  [] Dizziness or lightheadedness   [] Syncope or near syncope  [] Recent Fall  [] Shortness of breath at rest   [] Dyspnea with exertion  [] Decline in functional capacity (unable to perform activities they had previously been able to do)  [] Fatigue   [] Orthopnea  [] PND  [] Nocturnal cough  [] Hemoptysis  [] Chest pain, pressure, or discomfort  [] Palpitations  [] Abdominal distention  [] Abdominal bloating  [] Early satiety  [] Blood in stool   [] Diarrhea  [] Constipation  [] Nausea/Vomiting  [] Decreased urinary response to oral diuretic   [] Scrotal swelling   [x] Lower extremity edema(slight LLE chronic after hip replacement)  [] Used PRN doses of oral diuretic   [] Weight gain    Wt Readings from Last 3 Encounters:   05/05/25 49.9 kg (110 lb)   04/30/25 49.9 kg (110 lb)   04/22/25 51.3 kg (113 lb)       SOCIAL HISTORY:  [x] Denies tobacco, alcohol or illicit drug abuse  [] Tobacco use:  [] ETOH use:  [] Illicit drug use:        MEDICATIONS:    Allergies   Allergen Reactions    Altace [Ramipril]     Amlodipine      edema    Covera-Hs [Verapamil]     Hctz [Hydrochlorothiazide]     Influenza Vaccines      miscarriage     Prior to Visit Medications    Medication Sig Taking? Authorizing Provider   torsemide (DEMADEX) 10 MG tablet Take 1 tablet by mouth daily Yes Patricia Al, APRN - CNP   potassium

## 2025-05-05 NOTE — PLAN OF CARE
Problem: Chronic Conditions and Co-morbidities  Goal: Patient's chronic conditions and co-morbidity symptoms are monitored and maintained or improved  Flowsheets (Taken 5/5/2025 1246)  Care Plan - Patient's Chronic Conditions and Co-Morbidity Symptoms are Monitored and Maintained or Improved: Monitor and assess patient's chronic conditions and comorbid symptoms for stability, deterioration, or improvement

## 2025-05-14 PROBLEM — N17.9 AKI (ACUTE KIDNEY INJURY): Status: RESOLVED | Noted: 2025-02-22 | Resolved: 2025-05-14

## 2025-05-14 PROBLEM — R55 PRE-SYNCOPE: Status: RESOLVED | Noted: 2025-03-25 | Resolved: 2025-05-14

## 2025-05-14 PROBLEM — I45.9: Status: RESOLVED | Noted: 2025-03-16 | Resolved: 2025-05-14

## 2025-05-14 PROBLEM — I50.23 ACUTE ON CHRONIC SYSTOLIC HEART FAILURE (HCC): Status: RESOLVED | Noted: 2025-03-16 | Resolved: 2025-05-14

## 2025-05-14 PROBLEM — I95.1 SYNCOPE DUE TO ORTHOSTATIC HYPOTENSION: Status: RESOLVED | Noted: 2025-03-28 | Resolved: 2025-05-14

## 2025-05-14 PROBLEM — I44.1 SECOND DEGREE ATRIOVENTRICULAR BLOCK, MOBITZ (TYPE) I: Status: RESOLVED | Noted: 2022-10-25 | Resolved: 2025-05-14

## 2025-06-02 ENCOUNTER — HOSPITAL ENCOUNTER (OUTPATIENT)
Dept: OTHER | Age: 80
Setting detail: THERAPIES SERIES
Discharge: HOME | End: 2025-06-02
Payer: MEDICARE

## 2025-06-02 VITALS
WEIGHT: 111 LBS | OXYGEN SATURATION: 99 % | BODY MASS INDEX: 19.66 KG/M2 | RESPIRATION RATE: 18 BRPM | DIASTOLIC BLOOD PRESSURE: 63 MMHG | SYSTOLIC BLOOD PRESSURE: 116 MMHG | HEART RATE: 70 BPM

## 2025-06-02 LAB
ANION GAP SERPL CALCULATED.3IONS-SCNC: 12 MMOL/L (ref 7–16)
BNP SERPL-MCNC: 9439 PG/ML (ref 0–450)
BUN SERPL-MCNC: 18 MG/DL (ref 8–23)
CALCIUM SERPL-MCNC: 9.1 MG/DL (ref 8.8–10.2)
CHLORIDE SERPL-SCNC: 104 MMOL/L (ref 98–107)
CO2 SERPL-SCNC: 25 MMOL/L (ref 22–29)
CREAT SERPL-MCNC: 1.4 MG/DL (ref 0.5–1)
GFR, ESTIMATED: 38 ML/MIN/1.73M2
GLUCOSE SERPL-MCNC: 117 MG/DL (ref 74–99)
POTASSIUM SERPL-SCNC: 4.1 MMOL/L (ref 3.5–5.1)
SODIUM SERPL-SCNC: 141 MMOL/L (ref 136–145)

## 2025-06-02 PROCEDURE — 80048 BASIC METABOLIC PNL TOTAL CA: CPT

## 2025-06-02 PROCEDURE — 99214 OFFICE O/P EST MOD 30 MIN: CPT

## 2025-06-02 PROCEDURE — 83880 ASSAY OF NATRIURETIC PEPTIDE: CPT

## 2025-06-02 NOTE — PROGRESS NOTES
(mmol/L)   Date Value   06/02/2025 25   05/05/2025 25   04/30/2025 25     BUN (mg/dL)   Date Value   06/02/2025 18   05/05/2025 16   04/30/2025 15     Creatinine (mg/dL)   Date Value   06/02/2025 1.4 (H)   05/05/2025 1.3 (H)   04/30/2025 1.3 (H)     Glucose (mg/dL)   Date Value   06/02/2025 117 (H)   05/05/2025 175 (H)   04/30/2025 103 (H)     Calcium (mg/dL)   Date Value   06/02/2025 9.1   05/05/2025 9.0   04/30/2025 8.9     BNP:  NT Pro-BNP (pg/mL)   Date Value   06/02/2025 9,439 (H)   05/05/2025 7,851 (H)   04/30/2025 8,560 (H)      CBC:  WBC (k/uL)   Date Value   03/29/2025 4.9     Hemoglobin (g/dL)   Date Value   03/29/2025 10.6 (L)     Hematocrit (%)   Date Value   03/29/2025 34.5     Platelets (k/uL)   Date Value   03/27/2025 136     Iron Studies:  No results found for: \"FERRITIN\", \"IRON\", \"TIBC\"  Hepatic:  AST (U/L)   Date Value   03/25/2025 24     ALT (U/L)   Date Value   03/25/2025 7     Total Bilirubin (mg/dL)   Date Value   03/25/2025 0.5     Alkaline Phosphatase (U/L)   Date Value   03/25/2025 193 (H)     INR:  INR (no units)   Date Value   03/15/2025 1.1         Wt Readings from Last 3 Encounters:   06/02/25 50.3 kg (111 lb)   05/14/25 48.5 kg (107 lb)   05/05/25 49.9 kg (110 lb)       ASSESSMENT/PLAN:    [x] Euvolemic, NO JVD          [] Hypervolemic, with increase from baseline:  [] Shortness of breath/BAXTER  [] JVD  [] HJR  [] Abnormal lung assessment:   [] Orthopnea  [] PND  [] Decreased urinary response to oral diuretic   [] Scrotal swelling   [] Lower extremity edema  [] Compression stockings provided  [] Decline in functional capacity (unable to perform activities they had previously been able to do)  [] Weight gain   [] IV diuretics given No  [] Provider notified of recurrent IV diuretic use    Additional Notes:     Weight is STABLE from last visit.   Denies SOB. Has no C/O  Assessment unchanged from previous.   SON ADMITS DR. DUNBAR DISCONTINUED POTASSIUM   [x]Lab work obtained    [x]

## 2025-06-02 NOTE — PLAN OF CARE
Problem: Chronic Conditions and Co-morbidities  Goal: Patient's chronic conditions and co-morbidity symptoms are monitored and maintained or improved  Flowsheets (Taken 6/2/2025 1324)  Care Plan - Patient's Chronic Conditions and Co-Morbidity Symptoms are Monitored and Maintained or Improved: Monitor and assess patient's chronic conditions and comorbid symptoms for stability, deterioration, or improvement

## 2025-06-30 ENCOUNTER — HOSPITAL ENCOUNTER (OUTPATIENT)
Dept: OTHER | Age: 80
Setting detail: THERAPIES SERIES
Discharge: HOME | End: 2025-06-30
Payer: MEDICARE

## 2025-06-30 VITALS
WEIGHT: 111.5 LBS | DIASTOLIC BLOOD PRESSURE: 58 MMHG | OXYGEN SATURATION: 98 % | SYSTOLIC BLOOD PRESSURE: 115 MMHG | RESPIRATION RATE: 18 BRPM | HEART RATE: 70 BPM | BODY MASS INDEX: 19.75 KG/M2

## 2025-06-30 LAB
ANION GAP SERPL CALCULATED.3IONS-SCNC: 17 MMOL/L (ref 7–16)
BNP SERPL-MCNC: 9351 PG/ML (ref 0–450)
BUN SERPL-MCNC: 18 MG/DL (ref 8–23)
CALCIUM SERPL-MCNC: 8.8 MG/DL (ref 8.8–10.2)
CHLORIDE SERPL-SCNC: 104 MMOL/L (ref 98–107)
CO2 SERPL-SCNC: 20 MMOL/L (ref 22–29)
CREAT SERPL-MCNC: 1.3 MG/DL (ref 0.5–1)
GFR, ESTIMATED: 41 ML/MIN/1.73M2
GLUCOSE SERPL-MCNC: 175 MG/DL (ref 74–99)
POTASSIUM SERPL-SCNC: 3.9 MMOL/L (ref 3.5–5.1)
SODIUM SERPL-SCNC: 140 MMOL/L (ref 136–145)

## 2025-06-30 PROCEDURE — 80048 BASIC METABOLIC PNL TOTAL CA: CPT

## 2025-06-30 PROCEDURE — 83880 ASSAY OF NATRIURETIC PEPTIDE: CPT

## 2025-06-30 PROCEDURE — 99214 OFFICE O/P EST MOD 30 MIN: CPT

## 2025-06-30 NOTE — PROGRESS NOTES
Congestive Heart Failure Clinic   [facility]      Referring Provider: -  Primary Care Physician: Anjel Duran DO   Cardiologist: Dr. Cardenas  Nephrologist: -      HISTORY OF PRESENT ILLNESS:     Julita Madrid is a 79 y.o. (1945) female with a history of HFpEF (EF> 50%)  Pre Cupid:     Lab Results   Component Value Date    LVEF 50 03/16/2025     Post Cupid:  No results found for: \"EFBP\"      She presents to the CHF clinic for ongoing evaluation and monitoring of heart failure.    In the CHF clinic today she denies any adverse symptoms except:  [] Dizziness or lightheadedness   [] Syncope or near syncope  [] Recent Fall  [] Shortness of breath at rest   [] Dyspnea with exertion  [] Decline in functional capacity (unable to perform activities they had previously been able to do)  [x] Fatigue   [] Orthopnea  [] PND  [] Nocturnal cough  [] Hemoptysis  [] Chest pain, pressure, or discomfort  [] Palpitations  [] Abdominal distention  [] Abdominal bloating  [] Early satiety  [] Blood in stool   [] Diarrhea  [] Constipation  [] Nausea/Vomiting  [] Decreased urinary response to oral diuretic   [] Scrotal swelling   [x] Lower extremity edema(slight LLE chronic 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 Authorizing Provider   torsemide (DEMADEX) 10 MG tablet Take 1 tablet by mouth daily Yes Patricia Al, APRN - CNP   aspirin

## 2025-07-16 PROCEDURE — 93294 REM INTERROG EVL PM/LDLS PM: CPT | Performed by: INTERNAL MEDICINE

## 2025-07-16 PROCEDURE — 93296 REM INTERROG EVL PM/IDS: CPT | Performed by: INTERNAL MEDICINE

## 2025-08-05 ENCOUNTER — HOSPITAL ENCOUNTER (OUTPATIENT)
Dept: OTHER | Age: 80
Setting detail: THERAPIES SERIES
Discharge: HOME | End: 2025-08-05
Payer: MEDICARE

## 2025-08-05 VITALS
OXYGEN SATURATION: 100 % | HEART RATE: 65 BPM | RESPIRATION RATE: 18 BRPM | SYSTOLIC BLOOD PRESSURE: 120 MMHG | WEIGHT: 111 LBS | DIASTOLIC BLOOD PRESSURE: 76 MMHG | BODY MASS INDEX: 19.66 KG/M2

## 2025-08-05 LAB
ANION GAP SERPL CALCULATED.3IONS-SCNC: 12 MMOL/L (ref 7–16)
BNP SERPL-MCNC: ABNORMAL PG/ML (ref 0–450)
BUN SERPL-MCNC: 17 MG/DL (ref 8–23)
CALCIUM SERPL-MCNC: 9 MG/DL (ref 8.8–10.2)
CHLORIDE SERPL-SCNC: 103 MMOL/L (ref 98–107)
CO2 SERPL-SCNC: 24 MMOL/L (ref 22–29)
CREAT SERPL-MCNC: 1.4 MG/DL (ref 0.5–1)
GFR, ESTIMATED: 37 ML/MIN/1.73M2
GLUCOSE SERPL-MCNC: 137 MG/DL (ref 74–99)
POTASSIUM SERPL-SCNC: 4.1 MMOL/L (ref 3.5–5.1)
SODIUM SERPL-SCNC: 138 MMOL/L (ref 136–145)

## 2025-08-05 PROCEDURE — 99214 OFFICE O/P EST MOD 30 MIN: CPT

## 2025-08-05 PROCEDURE — 80048 BASIC METABOLIC PNL TOTAL CA: CPT

## 2025-08-05 PROCEDURE — 83880 ASSAY OF NATRIURETIC PEPTIDE: CPT

## 2025-08-05 ASSESSMENT — PATIENT HEALTH QUESTIONNAIRE - PHQ9
SUM OF ALL RESPONSES TO PHQ QUESTIONS 1-9: 0
1. LITTLE INTEREST OR PLEASURE IN DOING THINGS: NOT AT ALL
2. FEELING DOWN, DEPRESSED OR HOPELESS: NOT AT ALL
SUM OF ALL RESPONSES TO PHQ QUESTIONS 1-9: 0

## (undated) DEVICE — ELECTRODE PT RET AD L9FT HI MOIST COND ADH HYDRGEL CORDED

## (undated) DEVICE — PAD, DEFIB, ADULT, RADIOTRAN, PHYSIO, LO: Brand: MEDLINE

## (undated) DEVICE — DRESSING,GAUZE,XEROFORM,CURAD,5"X9",ST: Brand: CURAD

## (undated) DEVICE — NEPTUNE E-SEP SMOKE EVACUATION PENCIL, COATED, 70MM BLADE, PUSH BUTTON SWITCH: Brand: NEPTUNE E-SEP

## (undated) DEVICE — 3M™ IOBAN™ 2 ANTIMICROBIAL INCISE DRAPE 6650EZ: Brand: IOBAN™ 2

## (undated) DEVICE — NEEDLE HYPO 21GA L1.5IN GRN POLYPR HUB S STL REG BVL STR

## (undated) DEVICE — SYRINGE MED 50ML LUERLOCK TIP

## (undated) DEVICE — SYRINGE MED 20ML STD CLR PLAS LUERLOCK TIP N CTRL DISP

## (undated) DEVICE — INTRODUCER LD L13CM OD6FR SPLITTABLE DIL W/ J TIP GWIRE SYR

## (undated) DEVICE — RADIFOCUS GLIDEWIRE: Brand: GLIDEWIRE

## (undated) DEVICE — APPLICATOR MEDICATED 26 CC SOLUTION HI LT ORNG CHLORAPREP

## (undated) DEVICE — STRYKER PERFORMANCE SERIES SAGITTAL BLADE: Brand: STRYKER PERFORMANCE SERIES

## (undated) DEVICE — DRILL BIT 2.8MM (7/64'') X 128.0MM

## (undated) DEVICE — CABLE PACE L12FT ALGTR CLP DISP FOR PACE SYS ANALZR MERLIN

## (undated) DEVICE — AGENT HEMOSTATIC SURGIFLOW MATRIX KIT W/THROMBIN

## (undated) DEVICE — BLADE CLIPPER GEN PURP NS

## (undated) DEVICE — ELECTRODE ES L3IN S STL BLDE INSUL DISP VALLEYLAB EDGE

## (undated) DEVICE — MICROPUNCTURE INTRODUCER SET SILHOUETTE TRANSITIONLESS PUSH-PLUS DESIGN - STIFFENED CANNULA WITH STAINLESS STEEL WIRE GUIDE: Brand: MICROPUNCTURE

## (undated) DEVICE — STRAP POS MP 30X3 IN HK LOOP CLOSURE FOAM DISP